# Patient Record
Sex: FEMALE | Race: WHITE | NOT HISPANIC OR LATINO | Employment: OTHER | URBAN - METROPOLITAN AREA
[De-identification: names, ages, dates, MRNs, and addresses within clinical notes are randomized per-mention and may not be internally consistent; named-entity substitution may affect disease eponyms.]

---

## 2017-01-03 ENCOUNTER — TREATMENT (OUTPATIENT)
Dept: PHYSICAL THERAPY | Facility: CLINIC | Age: 62
End: 2017-01-03

## 2017-01-03 DIAGNOSIS — M25.511 ACUTE PAIN OF RIGHT SHOULDER: Primary | ICD-10-CM

## 2017-01-03 DIAGNOSIS — S46.011D ROTATOR CUFF STRAIN, RIGHT, SUBSEQUENT ENCOUNTER: ICD-10-CM

## 2017-01-03 PROCEDURE — 97140 MANUAL THERAPY 1/> REGIONS: CPT | Performed by: PHYSICAL THERAPIST

## 2017-01-03 PROCEDURE — 97110 THERAPEUTIC EXERCISES: CPT | Performed by: PHYSICAL THERAPIST

## 2017-01-03 NOTE — PROGRESS NOTES
Daily Progress Note      Subjective   Pt reports shoulder feels good but sometimes stiff.  Pt reports that shoulder is still making improvements.       Objective   See the Objective Evaluation flowsheet for any objective testing that may have been performed today.    PROCEDURES AND MODALITIES:    Manual PT 46019 10 minutes and Therapy Exercise 59951 20 minutes     Timed Code Treatment: 30 Minutes  Total Treatment Time: 31 Minutes    Assessment/Plan   Pt demonstrated gross shoulder abduction WNL without pain.  Progressed scapular stabilization activities today with mild difficulty and without pain.  Pt is progressing well towards goals and will continue to improve with skilled PT and HEP performance.  Progress strengthening /stabilization /functional activity  Progress treatment to 1x/wk and progress to higher level activity in order to progress to prior level of function including tennis.       Rosa Frey, PT, DPT  Physical Therapist

## 2017-01-05 ENCOUNTER — TREATMENT (OUTPATIENT)
Dept: PHYSICAL THERAPY | Facility: CLINIC | Age: 62
End: 2017-01-05

## 2017-01-05 DIAGNOSIS — M25.511 ACUTE PAIN OF RIGHT SHOULDER: Primary | ICD-10-CM

## 2017-01-05 DIAGNOSIS — S46.011D ROTATOR CUFF STRAIN, RIGHT, SUBSEQUENT ENCOUNTER: ICD-10-CM

## 2017-01-05 PROCEDURE — 97110 THERAPEUTIC EXERCISES: CPT | Performed by: PHYSICAL THERAPIST

## 2017-01-05 PROCEDURE — 97112 NEUROMUSCULAR REEDUCATION: CPT | Performed by: PHYSICAL THERAPIST

## 2017-01-05 NOTE — PROGRESS NOTES
Daily Progress Note      Subjective   Pt reports improvement in shoulder and continued pain only with certain movements.       Objective   See the Objective Evaluation flowsheet for any objective testing that may have been performed today.    PROCEDURES AND MODALITIES:    Manual PT 70077 8 minutes, Therapy Exercise 71157 24 minutes and NMR 47997 10 minutes     Timed Code Treatment: 42 Minutes  Total Treatment Time: 44 Minutes    Assessment/Plan   Progressed shoulder and scapular strengthening today.  Pt reported mild muscle fatigue during treatment session but no increased shoulder pain.  Initiated control activities in 90 degrees of shoulder abduction and ER to improve shoulder and scapular control for tennis activities.   Progress per Plan of Care         Rosa Frey, PT, DPT  Physical Therapist

## 2017-01-12 ENCOUNTER — TREATMENT (OUTPATIENT)
Dept: PHYSICAL THERAPY | Facility: CLINIC | Age: 62
End: 2017-01-12

## 2017-01-12 DIAGNOSIS — M25.511 ACUTE PAIN OF RIGHT SHOULDER: Primary | ICD-10-CM

## 2017-01-12 DIAGNOSIS — S46.011D ROTATOR CUFF STRAIN, RIGHT, SUBSEQUENT ENCOUNTER: ICD-10-CM

## 2017-01-12 PROCEDURE — 97110 THERAPEUTIC EXERCISES: CPT | Performed by: PHYSICAL THERAPIST

## 2017-01-12 NOTE — PATIENT INSTRUCTIONS
Sleep position neutral spine and neutral shoulder position  HEP performance  Please view [facility] Fred Farr for a complete list of HEP instructions.  Appropriate response to treatment and soreness should subside within 24-48 hrs  Gradual return to tennis by assessing movement with racquet without impact  Day of rest in between strengthening activities

## 2017-01-12 NOTE — PROGRESS NOTES
Recertification    Patient: Fred Farr   : 1955  Diagnosis/ICD-10 Code:  Acute pain of right shoulder [M25.511]  Referring practitioner: Viri Caldera MD  Date of Initial Visit: 16  Today's Date: 2017  Patient seen for 8 sessions    Subjective:   Fred Farr reports: no pain at rest but certain positions cause a twinge in shoulder including sleeping with arm elevated above head  Subjective Questionnaire: DASH: 10%  Clinical Progress: improved  Home Program Compliance: Yes  Treatment has included: therapeutic exercise, neuromuscular re-education, manual therapy and therapeutic activity    Objective:   General  Palpation: Right long head biceps tendon and greater tuberosity of humerus (+) TTP   AROM:   RUE AROM (degrees)  R Shoulder ABduction 0-140: 185 Degrees       Strength:  RUE Strength  R Shoulder Flexion: 4+/5 (superior and lateral shoulder pain)  R Shoulder ABduction: 5-/5  R Shoulder External Rotation: 5/5  R Shoulder Horizontal ABduction: 5/5  R Elbow Flexion: 5/5       Special Tests:    Shoulder Special Tests  Drop Arm Test (Full Thickness RC Lesion): Right:, Negative  Empty Can Test (RC Lesion): Right:, Negative  Speed's Test (LH of Biceps Lesion): Right:, Negative  Painful Arc: Right:, Negative  [place]'s Test: Right:, Negative    Right combined shoulder Extension and IR behind back to T10  Assessment:   Functional Limitations: Lifting, Carrying, Pushing, Pulling, Complaints of Pain, Decreased Strength     Pt demonstrates improved symptoms, ROM and strength.  Pt continues to demonstrate weakness and pain with resisted shoulder flexion.  Pt educated on HEP and progression of return to full tennis activity.  Pt is appropriate to decrease frequency of PT visits with continued HEP performance.  Pt will follow up weekly to monitor progress, HEP compliance and gradual return to full function.    Plan:   PT Interventions: Therapeutic exercise - AROM, Therapeutic exercise - stretching,  Therapeutic exercise - strengthening, Manual Therapy, Bracing/Taping, Soft Tissue mobilization, Hot packs/ Moist heat, Electrical stimulation, Posture training,  training, Home Exercise Program  Progress toward previous goals: Partially Met - 3 goals met to date    New Goals  Short-term goals (STG): In 1 week:  Pt will perform all HEP exercises without increased pain.  Pt will sleep without interruptions secondary to shoulder pain.    Long-term goals (LTG): In 3 weeks:  Right shoulder flexion strength improve to 4+/5 without pain to demonstrate improved strength for overhead tasks, lifting and carrying.  Pt will initiate low duration tennis activity without increased pain.  DASH score improve to 0% to demonstrate functional improvement.      Recommendations: Continue with recommendations decrease frequency to 1x/week due to progress and HEP compliance  Timeframe: 3 weeks  Prognosis to achieve goals: good    01/12/2017 Treatments:     Therapy Exercise 38769 38 minutes    Timed Code Treatment: 38   Minutes     Total Treatment Time: 39      Minutes      PT Signature: Rosa Frey, PT, DPT      Based upon review of the patient's progress and continued therapy plan, it is my medical opinion that Fred Farr should continue physical therapy treatment at [address] [facility]  [address] [address] [id number] [phone].    Signature:  Viri Caldera MD

## 2017-01-19 ENCOUNTER — TREATMENT (OUTPATIENT)
Dept: PHYSICAL THERAPY | Facility: CLINIC | Age: 62
End: 2017-01-19

## 2017-01-19 DIAGNOSIS — M25.511 ACUTE PAIN OF RIGHT SHOULDER: Primary | ICD-10-CM

## 2017-01-19 DIAGNOSIS — S46.011D ROTATOR CUFF STRAIN, RIGHT, SUBSEQUENT ENCOUNTER: ICD-10-CM

## 2017-01-19 PROCEDURE — 97110 THERAPEUTIC EXERCISES: CPT | Performed by: PHYSICAL THERAPIST

## 2017-01-19 NOTE — PROGRESS NOTES
Daily Progress Note      Subjective   Pt reports increased soreness earlier in the week due to increased UE carrying and housework activities.  Pt reports minimal compliance with HEP.      Objective   See the Objective Evaluation flowsheet for any objective testing that may have been performed today.    PROCEDURES AND MODALITIES:    Therapy Exercise 13153 34 minutes     Timed Code Treatment: 34 Minutes  Total Treatment Time: 38 Minutes    Assessment/Plan   Pt has maintained full shoulder ROM without pain.  Pt continues to report increased pain and demonstrates weakness with resisted shoulder flexion and abduction.  Pt encouraged on regular HEP performance to strengthen shoulder to carry out functional activities without shoulder pain.  Therapeutic exercises and HEP modified to decrease resistance for shoulder flexion due to pain with resisted testing and higher level of resistance.   Progress per Plan of Care         Rosa Frey, PT, DPT  Physical Therapist

## 2017-01-19 NOTE — PATIENT INSTRUCTIONS
Home ice application  Appropriate response to treatment  HEP performance  Please view [facility] Fred Farr for a complete list of HEP instructions.  Activity modification to decrease stress on right shoulder  Theraband dispensed for HEP

## 2017-01-26 ENCOUNTER — TREATMENT (OUTPATIENT)
Dept: PHYSICAL THERAPY | Facility: CLINIC | Age: 62
End: 2017-01-26

## 2017-01-26 DIAGNOSIS — M25.511 ACUTE PAIN OF RIGHT SHOULDER: Primary | ICD-10-CM

## 2017-01-26 DIAGNOSIS — S46.011D ROTATOR CUFF STRAIN, RIGHT, SUBSEQUENT ENCOUNTER: ICD-10-CM

## 2017-01-26 PROCEDURE — 97110 THERAPEUTIC EXERCISES: CPT | Performed by: PHYSICAL THERAPIST

## 2017-01-26 NOTE — PATIENT INSTRUCTIONS
Sleep position and neutral spine  HEP performance  Purpose of treatment  Please view [facility] Farr for a complete list of HEP instructions.

## 2017-01-26 NOTE — PROGRESS NOTES
Physical Therapy Daily Progress Note    Time In 0907  Time Out 0945    Fred Farr reports: overall still feeling better but shoulder is aggravated with certain movements including reaching and carrying tasks.    Subjective     Objective     Active Range of Motion     Right Shoulder   Flexion: WFL  Abduction: 150 degrees with pain  External rotation 90°: WFL  Internal rotation 90°: 54 degrees with pain    Functional Assessment     Comments  Right shoulder behind back combined extension and internal rotation to T10     See Exercise, Manual, and Modality Logs for complete treatment.       Assessment & Plan     Assessment  Assessment details: Pt demonstrates continued weakness and limited abduction and IR ROM compared to last assessment. Pt required education on importance of regular HEP performance in order to progress and determine if physical therapy is improving status of shoulder.  Pt required increased frequency of PT visits in order to address impairments and ensure compliance with treatment plan.  Pt agrees with plan and demonstrates improved motivation to increase compliance    Plan  Frequency: 2x week  Duration in visits: 2  Treatment plan discussed with: patient  Plan details: Monitor progress and refer to MD if indicated        Progress per Plan of Care           Manual Therapy:    -     mins  02642;  Therapeutic Exercise:    38     mins  70807;     Neuromuscular Jonathon:    -    mins  99664;    Therapeutic Activity:     -     mins  61441;     Gait Training:      -     mins  04141;     Ultrasound:     -     mins  40300;    Electrical Stimulation:    -     mins  00371 ( );  Dry Needling     -     mins self-pay    Timed Treatment:   38   mins   Total Treatment:     38   mins    Rosa Frey, PT, DPT  Physical Therapist

## 2017-02-02 ENCOUNTER — TREATMENT (OUTPATIENT)
Dept: PHYSICAL THERAPY | Facility: CLINIC | Age: 62
End: 2017-02-02

## 2017-02-02 DIAGNOSIS — S46.011D ROTATOR CUFF STRAIN, RIGHT, SUBSEQUENT ENCOUNTER: ICD-10-CM

## 2017-02-02 DIAGNOSIS — M25.511 ACUTE PAIN OF RIGHT SHOULDER: Primary | ICD-10-CM

## 2017-02-02 PROCEDURE — 97110 THERAPEUTIC EXERCISES: CPT | Performed by: PHYSICAL THERAPIST

## 2017-02-02 NOTE — PROGRESS NOTES
Physical Therapy Daily Progress Note    Time In 0800  Time Out 0835    Fred Farr reports: some improvement in shoulder symptoms.    Subjective     Objective     Active Range of Motion     Right Shoulder   Abduction: 183 degrees      See Exercise, Manual, and Modality Logs for complete treatment.       Assessment & Plan     Assessment  Assessment details: Pt demonstrates increased compliance with HEP and improved shoulder AROM.  Pt continues to require skilled physical therapy to improve strength and functional mobility without pain.  Pt tolerated treatment well with mild fatigue and without increased pain.       Progress per Plan of Care           Manual Therapy:    8     mins  14025;  Therapeutic Exercise:    27     mins  39071;     Neuromuscular Jonathon:    -    mins  46164;    Therapeutic Activity:     -     mins  57242;     Gait Training:      -     mins  67360;     Ultrasound:     -     mins  87423;    Electrical Stimulation:    -     mins  91758 ( );  Dry Needling     -     mins self-pay    Timed Treatment:   35   mins   Total Treatment:     35   mins    Rosa Frey, PT, DPT  Physical Therapist

## 2017-02-09 ENCOUNTER — TREATMENT (OUTPATIENT)
Dept: PHYSICAL THERAPY | Facility: CLINIC | Age: 62
End: 2017-02-09

## 2017-02-09 DIAGNOSIS — S46.011D ROTATOR CUFF STRAIN, RIGHT, SUBSEQUENT ENCOUNTER: ICD-10-CM

## 2017-02-09 DIAGNOSIS — M25.511 ACUTE PAIN OF RIGHT SHOULDER: Primary | ICD-10-CM

## 2017-02-09 PROCEDURE — 97535 SELF CARE MNGMENT TRAINING: CPT | Performed by: PHYSICAL THERAPIST

## 2017-02-09 PROCEDURE — 97140 MANUAL THERAPY 1/> REGIONS: CPT | Performed by: PHYSICAL THERAPIST

## 2017-02-09 PROCEDURE — 97110 THERAPEUTIC EXERCISES: CPT | Performed by: PHYSICAL THERAPIST

## 2017-02-09 NOTE — PROGRESS NOTES
"Physical Therapy Daily Progress Note    Time In 0836  Time Out 0915    Fred Farr reports: shoulder is doing ok but some days are \"stiffer\" than others and stretching helps.  Pt reports improved compliance with HEP but \"would not give herself and A+\".    Subjective     Objective     Active Range of Motion     Right Shoulder   Flexion: 182 degrees   Abduction: 161 degrees     Passive Range of Motion     Right Shoulder   Abduction: 183 degrees     Additional Passive Range of Motion Details  IR WFL with pain at end-range       See Exercise, Manual, and Modality Logs for complete treatment.       Assessment & Plan     Assessment  Assessment details: Pt demonstrates decreased shoulder abduction AROM secondary to pain, but continued full PROM.  Pt reports improved compliance with HEP and treatment session focused on improving pain-free ROM and strength.  Pt required review of pathology, involved anatomy, risk of injury, and importance of HEP compliance.  Pt is progressing well with small occasional regressions in ROM likely due to use of right UE and intermittent compliance with HEP.  Pt required frequent verbal cueing to decrease upper trapezius activation and related shoulder elevation during therapeutic exercises.        Progress per Plan of Care           Manual Therapy:    8     mins  33045;  Therapeutic Exercise:    22     mins  37071;     Neuromuscular Jonathon:    -    mins  10585;    Therapeutic Activity:     -     mins  40405;     Gait Training:      -     mins  46741;     Ultrasound:     -     mins  43047;    Electrical Stimulation:    -     mins  31911 ( );  Dry Needling     -     mins self-pay  Self [facility] Home Mgt 8 mins [id number]    Timed Treatment:   38   mins   Total Treatment:     39   mins    Rosa Frey, PT, DPT  Physical Therapist    "

## 2017-02-16 ENCOUNTER — TREATMENT (OUTPATIENT)
Dept: PHYSICAL THERAPY | Facility: CLINIC | Age: 62
End: 2017-02-16

## 2017-02-16 DIAGNOSIS — S46.011D ROTATOR CUFF STRAIN, RIGHT, SUBSEQUENT ENCOUNTER: ICD-10-CM

## 2017-02-16 DIAGNOSIS — M25.511 ACUTE PAIN OF RIGHT SHOULDER: Primary | ICD-10-CM

## 2017-02-16 PROCEDURE — 97110 THERAPEUTIC EXERCISES: CPT | Performed by: PHYSICAL THERAPIST

## 2017-02-16 NOTE — PROGRESS NOTES
Physical Therapy Daily Progress Note    Time In 0803  Time Out 0839    Fred Farr reports: improvement in shoulder symptoms and some improved HEP compliance.  Pt reports having to be cautious not to aggravate shoulder with heavy bag on that side etc.    Subjective     Objective     Active Range of Motion     Right Shoulder   Flexion: WFL  Abduction: 180 degrees     Strength/Myotome Testing     Right Shoulder     Planes of Motion   Flexion: 4+ (increased pain upper lateral and under arm)   Abduction: 4+ (increased pain upper lateral and under arm)      See Exercise, Manual, and Modality Logs for complete treatment.       Assessment & Plan     Assessment  Assessment details: Pt demonstrates improved shoulder abduction ROM and has met associated goal.  Pt demonstrates improved strength but continued pain with resisted testing.  Compliance has improved with HEP, but pt requires continued skilled PT to progress strength for functional activities of lifting and carrying for work bag and groceries and progress to (I) with HEP.       Progress per Plan of Care           Manual Therapy:    -     mins  29602;  Therapeutic Exercise:    34     mins  31218;     Neuromuscular Jonathon:    -    mins  27369;    Therapeutic Activity:     -     mins  47142;     Gait Training:      -     mins  01471;     Ultrasound:     -     mins  39651;    Electrical Stimulation:    -     mins  99147 ( );  Dry Needling     -     mins self-pay    Timed Treatment:   34   mins   Total Treatment:     36   mins    Rosa Frey, PT, DPT  Physical Therapist

## 2017-02-23 ENCOUNTER — TREATMENT (OUTPATIENT)
Dept: PHYSICAL THERAPY | Facility: CLINIC | Age: 62
End: 2017-02-23

## 2017-02-23 DIAGNOSIS — S46.011D ROTATOR CUFF STRAIN, RIGHT, SUBSEQUENT ENCOUNTER: ICD-10-CM

## 2017-02-23 DIAGNOSIS — M25.511 ACUTE PAIN OF RIGHT SHOULDER: Primary | ICD-10-CM

## 2017-02-23 PROCEDURE — 97110 THERAPEUTIC EXERCISES: CPT | Performed by: PHYSICAL THERAPIST

## 2017-02-23 PROCEDURE — A9999 DME SUPPLY OR ACCESSORY, NOS: HCPCS | Performed by: PHYSICAL THERAPIST

## 2017-02-23 NOTE — PROGRESS NOTES
Discharge Report    Patient Name: Fred Farr       Patient MRN: [id number]  : 1955  Physician:Viri Caldera MD  Date: 2017    Encounter Diagnoses   Name Primary?   • Acute pain of right shoulder Yes   • Rotator cuff strain, right, subsequent encounter        Treatment has included therapeutic exercise, neuromuscular re-education, manual therapy and cryotherapy    Physical Therapy Daily Progress Note    Time In 0808  Time Out 0833    Fred Farr reports: doing well and no difficulty with daily activities.  Pt reports feeling ready for graduation.  DASH: 5%    Subjective     Objective     Active Range of Motion     Right Shoulder   Flexion: WFL  Abduction: WFL    Additional Active Range of Motion Details  Functional ROM behind back T9      Strength/Myotome Testing     Right Shoulder     Planes of Motion   Flexion: 4+   Abduction: 4+      See Exercise, Manual, and Modality Logs for complete treatment.       Assessment & Plan     Assessment  Assessment details: Pt demonstrates improved strength and ROM and no pain with functional activities.  Pt did not report pain with resisted testing and performs ADLs without pain.  Pt has met majority of goals and has not attempted tennis activity yet.  Pt educated on re-initiating tennis activity in low duration.  Progressed therapeutic exercises and HEP today with increased resistance and without pain.  Pt demonstrates good understanding and fait compliance with HEP.  Pt is appropriate for d/c to (I) with HEP and instructed to call PT with questions or concerns.       Other - d/c to (I) with HEP      Progress towards goals: Partially Met    Discharge Reason: Anticipate patient will achieve long-term goals independently      Plan of Care: Continue with current home exercise program as instructed    Prognosis: good    Understanding at Discharge: good    Rosa Frey, PT, DPT  Physical Therapist

## 2017-02-23 NOTE — PROGRESS NOTES
Physical Therapy Daily Progress Note    Time In 0808  Time Out 0833    Fred Farr reports: doing well and no difficulty with daily activities.  Pt reports feeling ready for graduation.  DASH: 5%    Subjective     Objective     Active Range of Motion     Right Shoulder   Flexion: WFL  Abduction: WFL    Additional Active Range of Motion Details  Functional ROM behind back T9      Strength/Myotome Testing     Right Shoulder     Planes of Motion   Flexion: 4+   Abduction: 4+      See Exercise, Manual, and Modality Logs for complete treatment.       Assessment & Plan     Assessment  Assessment details: Pt demonstrates improved strength and ROM and no pain with functional activities.  Pt did not report pain with resisted testing and performs ADLs without pain.  Pt has met majority of goals and has not attempted tennis activity yet.  Pt educated on re-initiating tennis activity in low duration.  Progressed therapeutic exercises and HEP today with increased resistance and without pain.  Pt demonstrates good understanding and fait compliance with HEP.  Pt is appropriate for d/c to (I) with HEP and instructed to call PT with questions or concerns.       Other - d/c to (I) with HEP           Manual Therapy:    -     mins  01460;  Therapeutic Exercise:    25     mins  48307;     Neuromuscular Jonathon:    -    mins  55118;    Therapeutic Activity:     -     mins  89553;     Gait Training:      -     mins  81638;     Ultrasound:     -     mins  77521;    Electrical Stimulation:    -     mins  14944 ( );  Dry Needling     -     mins self-pay    Timed Treatment:   25   mins   Total Treatment:     25   mins    Rosa Frey, PT, DPT  Physical Therapist

## 2017-02-23 NOTE — PATIENT INSTRUCTIONS
HEP performance  Please view [facility] Fred Farr for a complete list of HEP instructions.  Dispensed Theraband for HEP performance

## 2017-02-27 RX ORDER — BUPROPION HYDROCHLORIDE 150 MG/1
TABLET ORAL
Qty: 30 TABLET | Refills: 0 | Status: SHIPPED | OUTPATIENT
Start: 2017-02-27 | End: 2017-03-29 | Stop reason: SDUPTHER

## 2017-03-29 RX ORDER — BUPROPION HYDROCHLORIDE 150 MG/1
TABLET ORAL
Qty: 30 TABLET | Refills: 0 | Status: SHIPPED | OUTPATIENT
Start: 2017-03-29 | End: 2017-04-28 | Stop reason: SDUPTHER

## 2017-04-28 RX ORDER — BUPROPION HYDROCHLORIDE 150 MG/1
TABLET ORAL
Qty: 30 TABLET | Refills: 3 | Status: SHIPPED | OUTPATIENT
Start: 2017-04-28 | End: 2017-08-18 | Stop reason: SDUPTHER

## 2017-05-22 ENCOUNTER — TRANSCRIBE ORDERS (OUTPATIENT)
Dept: ADMINISTRATIVE | Facility: HOSPITAL | Age: 62
End: 2017-05-22

## 2017-05-22 DIAGNOSIS — Z12.31 VISIT FOR SCREENING MAMMOGRAM: Primary | ICD-10-CM

## 2017-06-01 ENCOUNTER — HOSPITAL ENCOUNTER (OUTPATIENT)
Dept: MAMMOGRAPHY | Facility: HOSPITAL | Age: 62
Discharge: HOME | End: 2017-06-01
Admitting: INTERNAL MEDICINE

## 2017-06-01 DIAGNOSIS — Z12.31 VISIT FOR SCREENING MAMMOGRAM: ICD-10-CM

## 2017-06-01 PROCEDURE — 77063 BREAST TOMOSYNTHESIS BI: CPT

## 2017-06-01 PROCEDURE — G0202 SCR MAMMO BI INCL CAD: HCPCS

## 2017-08-18 RX ORDER — BUPROPION HYDROCHLORIDE 150 MG/1
TABLET ORAL
Qty: 30 TABLET | Refills: 0 | Status: SHIPPED | OUTPATIENT
Start: 2017-08-18 | End: 2017-09-14 | Stop reason: SDUPTHER

## 2017-09-14 RX ORDER — BUPROPION HYDROCHLORIDE 150 MG/1
TABLET ORAL
Qty: 90 TABLET | Refills: 0 | Status: SHIPPED | OUTPATIENT
Start: 2017-09-14 | End: 2017-10-20 | Stop reason: SDUPTHER

## 2017-09-24 DIAGNOSIS — N28.9 RENAL INSUFFICIENCY: Primary | ICD-10-CM

## 2017-10-04 ENCOUNTER — TELEPHONE (OUTPATIENT)
Dept: INTERNAL MEDICINE | Facility: CLINIC | Age: 62
End: 2017-10-04

## 2017-10-04 NOTE — TELEPHONE ENCOUNTER
Patient had to cancel physical due to work conflict and she needs to reschedule. She doesn't want to wait until April-May for next available. Is there anywhere we can work her in? Mondays and Fridays work best. Please advise.     Spoke to Ting and found a cancellation for patient that works. So disregard note.

## 2017-10-13 DIAGNOSIS — Z00.00 HEALTH CARE MAINTENANCE: Primary | ICD-10-CM

## 2017-10-14 LAB
ALBUMIN SERPL-MCNC: 4.5 G/DL (ref 3.5–5.2)
ALBUMIN/GLOB SERPL: 1.8 G/DL
ALP SERPL-CCNC: 66 U/L (ref 39–117)
ALT SERPL-CCNC: 18 U/L (ref 1–33)
APPEARANCE UR: CLEAR
AST SERPL-CCNC: 24 U/L (ref 1–32)
BACTERIA #/AREA URNS HPF: NORMAL /HPF
BASOPHILS # BLD AUTO: 0.04 10*3/MM3 (ref 0–0.2)
BASOPHILS NFR BLD AUTO: 0.6 % (ref 0–1.5)
BILIRUB SERPL-MCNC: 0.4 MG/DL (ref 0.1–1.2)
BILIRUB UR QL STRIP: NEGATIVE
BUN SERPL-MCNC: 22 MG/DL (ref 8–23)
BUN/CREAT SERPL: 22.9 (ref 7–25)
CALCIUM SERPL-MCNC: 9.8 MG/DL (ref 8.6–10.5)
CHLORIDE SERPL-SCNC: 103 MMOL/L (ref 98–107)
CHOLEST SERPL-MCNC: 186 MG/DL (ref 0–200)
CO2 SERPL-SCNC: 27.1 MMOL/L (ref 22–29)
COLOR UR: YELLOW
CREAT SERPL-MCNC: 0.96 MG/DL (ref 0.57–1)
EOSINOPHIL # BLD AUTO: 0.1 10*3/MM3 (ref 0–0.7)
EOSINOPHIL NFR BLD AUTO: 1.6 % (ref 0.3–6.2)
EPI CELLS #/AREA URNS HPF: NORMAL /HPF
ERYTHROCYTE [DISTWIDTH] IN BLOOD BY AUTOMATED COUNT: 15.5 % (ref 11.7–13)
GLOBULIN SER CALC-MCNC: 2.5 GM/DL
GLUCOSE SERPL-MCNC: 87 MG/DL (ref 65–99)
GLUCOSE UR QL: NEGATIVE
HCT VFR BLD AUTO: 47.5 % (ref 35.6–45.5)
HDLC SERPL-MCNC: 68 MG/DL (ref 40–60)
HGB BLD-MCNC: 15.2 G/DL (ref 11.9–15.5)
HGB UR QL STRIP: NEGATIVE
IMM GRANULOCYTES # BLD: 0.02 10*3/MM3 (ref 0–0.03)
IMM GRANULOCYTES NFR BLD: 0.3 % (ref 0–0.5)
KETONES UR QL STRIP: NEGATIVE
LDLC SERPL CALC-MCNC: 104 MG/DL (ref 0–100)
LEUKOCYTE ESTERASE UR QL STRIP: NEGATIVE
LYMPHOCYTES # BLD AUTO: 2.08 10*3/MM3 (ref 0.9–4.8)
LYMPHOCYTES NFR BLD AUTO: 33.5 % (ref 19.6–45.3)
MCH RBC QN AUTO: 29.6 PG (ref 26.9–32)
MCHC RBC AUTO-ENTMCNC: 32 G/DL (ref 32.4–36.3)
MCV RBC AUTO: 92.4 FL (ref 80.5–98.2)
MICRO URNS: NORMAL
MICRO URNS: NORMAL
MONOCYTES # BLD AUTO: 0.42 10*3/MM3 (ref 0.2–1.2)
MONOCYTES NFR BLD AUTO: 6.8 % (ref 5–12)
MUCOUS THREADS URNS QL MICRO: PRESENT /HPF
NEUTROPHILS # BLD AUTO: 3.54 10*3/MM3 (ref 1.9–8.1)
NEUTROPHILS NFR BLD AUTO: 57.2 % (ref 42.7–76)
NITRITE UR QL STRIP: NEGATIVE
PH UR STRIP: 7.5 [PH] (ref 5–7.5)
PLATELET # BLD AUTO: 282 10*3/MM3 (ref 140–500)
POTASSIUM SERPL-SCNC: 5.2 MMOL/L (ref 3.5–5.2)
PROT SERPL-MCNC: 7 G/DL (ref 6–8.5)
PROT UR QL STRIP: NEGATIVE
RBC # BLD AUTO: 5.14 10*6/MM3 (ref 3.9–5.2)
RBC #/AREA URNS HPF: NORMAL /HPF
SODIUM SERPL-SCNC: 143 MMOL/L (ref 136–145)
SP GR UR: 1.02 (ref 1–1.03)
TRIGL SERPL-MCNC: 70 MG/DL (ref 0–150)
TSH SERPL DL<=0.005 MIU/L-ACNC: 1.55 MIU/ML (ref 0.27–4.2)
URINALYSIS REFLEX: NORMAL
UROBILINOGEN UR STRIP-MCNC: 0.2 MG/DL (ref 0.2–1)
VLDLC SERPL CALC-MCNC: 14 MG/DL (ref 5–40)
WBC # BLD AUTO: 6.2 10*3/MM3 (ref 4.5–10.7)
WBC #/AREA URNS HPF: NORMAL /HPF

## 2017-10-15 NOTE — PROGRESS NOTES
Your laboratory results are NORMAL. We will discuss these results in detail at your next office visit. Please call with any questions or concerns.

## 2017-10-20 ENCOUNTER — OFFICE VISIT (OUTPATIENT)
Dept: INTERNAL MEDICINE | Facility: CLINIC | Age: 62
End: 2017-10-20

## 2017-10-20 VITALS — WEIGHT: 148 LBS | BODY MASS INDEX: 23.18 KG/M2 | OXYGEN SATURATION: 97 % | HEART RATE: 74 BPM

## 2017-10-20 DIAGNOSIS — Z12.11 COLON CANCER SCREENING: ICD-10-CM

## 2017-10-20 DIAGNOSIS — Z00.00 HEALTH CARE MAINTENANCE: Primary | ICD-10-CM

## 2017-10-20 PROCEDURE — 90656 IIV3 VACC NO PRSV 0.5 ML IM: CPT | Performed by: INTERNAL MEDICINE

## 2017-10-20 PROCEDURE — 90472 IMMUNIZATION ADMIN EACH ADD: CPT | Performed by: INTERNAL MEDICINE

## 2017-10-20 PROCEDURE — 90715 TDAP VACCINE 7 YRS/> IM: CPT | Performed by: INTERNAL MEDICINE

## 2017-10-20 PROCEDURE — 90471 IMMUNIZATION ADMIN: CPT | Performed by: INTERNAL MEDICINE

## 2017-10-20 PROCEDURE — 99396 PREV VISIT EST AGE 40-64: CPT | Performed by: INTERNAL MEDICINE

## 2017-10-20 RX ORDER — BUPROPION HYDROCHLORIDE 150 MG/1
150 TABLET ORAL EVERY MORNING
Qty: 90 TABLET | Refills: 3 | Status: SHIPPED | OUTPATIENT
Start: 2017-10-20 | End: 2018-11-02 | Stop reason: SDUPTHER

## 2017-12-13 RX ORDER — BUPROPION HYDROCHLORIDE 150 MG/1
TABLET ORAL
Qty: 90 TABLET | Refills: 0 | Status: SHIPPED | OUTPATIENT
Start: 2017-12-13 | End: 2018-03-02 | Stop reason: SDUPTHER

## 2018-01-12 DIAGNOSIS — Z12.11 ENCOUNTER FOR SCREENING FOR MALIGNANT NEOPLASM OF COLON: Primary | ICD-10-CM

## 2018-01-12 RX ORDER — SODIUM CHLORIDE, SODIUM LACTATE, POTASSIUM CHLORIDE, CALCIUM CHLORIDE 600; 310; 30; 20 MG/100ML; MG/100ML; MG/100ML; MG/100ML
30 INJECTION, SOLUTION INTRAVENOUS CONTINUOUS
Status: CANCELLED | OUTPATIENT
Start: 2018-03-05

## 2018-01-18 PROBLEM — Z12.11 ENCOUNTER FOR SCREENING FOR MALIGNANT NEOPLASM OF COLON: Status: ACTIVE | Noted: 2018-01-18

## 2018-03-05 ENCOUNTER — ANESTHESIA (OUTPATIENT)
Dept: GASTROENTEROLOGY | Facility: HOSPITAL | Age: 63
End: 2018-03-05

## 2018-03-05 ENCOUNTER — TELEPHONE (OUTPATIENT)
Dept: GASTROENTEROLOGY | Facility: CLINIC | Age: 63
End: 2018-03-05

## 2018-03-05 ENCOUNTER — HOSPITAL ENCOUNTER (OUTPATIENT)
Facility: HOSPITAL | Age: 63
Setting detail: HOSPITAL OUTPATIENT SURGERY
Discharge: HOME | End: 2018-03-05
Attending: INTERNAL MEDICINE | Admitting: INTERNAL MEDICINE

## 2018-03-05 ENCOUNTER — ANESTHESIA EVENT (OUTPATIENT)
Dept: GASTROENTEROLOGY | Facility: HOSPITAL | Age: 63
End: 2018-03-05

## 2018-03-05 VITALS
RESPIRATION RATE: 14 BRPM | WEIGHT: 148.8 LBS | HEIGHT: 67 IN | HEART RATE: 62 BPM | TEMPERATURE: 97.6 F | DIASTOLIC BLOOD PRESSURE: 74 MMHG | OXYGEN SATURATION: 100 % | SYSTOLIC BLOOD PRESSURE: 105 MMHG | BODY MASS INDEX: 23.35 KG/M2

## 2018-03-05 DIAGNOSIS — Z12.11 ENCOUNTER FOR SCREENING FOR MALIGNANT NEOPLASM OF COLON: ICD-10-CM

## 2018-03-05 PROCEDURE — S0260 H&P FOR SURGERY: HCPCS | Performed by: INTERNAL MEDICINE

## 2018-03-05 PROCEDURE — 25010000002 PROPOFOL 10 MG/ML EMULSION: Performed by: ANESTHESIOLOGY

## 2018-03-05 PROCEDURE — 45378 DIAGNOSTIC COLONOSCOPY: CPT | Performed by: INTERNAL MEDICINE

## 2018-03-05 RX ORDER — LIDOCAINE HYDROCHLORIDE 20 MG/ML
INJECTION, SOLUTION INFILTRATION; PERINEURAL AS NEEDED
Status: DISCONTINUED | OUTPATIENT
Start: 2018-03-05 | End: 2018-03-05 | Stop reason: SURG

## 2018-03-05 RX ORDER — PROPOFOL 10 MG/ML
VIAL (ML) INTRAVENOUS AS NEEDED
Status: DISCONTINUED | OUTPATIENT
Start: 2018-03-05 | End: 2018-03-05 | Stop reason: SURG

## 2018-03-05 RX ORDER — PROPOFOL 10 MG/ML
VIAL (ML) INTRAVENOUS CONTINUOUS PRN
Status: DISCONTINUED | OUTPATIENT
Start: 2018-03-05 | End: 2018-03-05 | Stop reason: SURG

## 2018-03-05 RX ORDER — SODIUM CHLORIDE, SODIUM LACTATE, POTASSIUM CHLORIDE, CALCIUM CHLORIDE 600; 310; 30; 20 MG/100ML; MG/100ML; MG/100ML; MG/100ML
30 INJECTION, SOLUTION INTRAVENOUS CONTINUOUS
Status: DISCONTINUED | OUTPATIENT
Start: 2018-03-05 | End: 2018-03-05 | Stop reason: HOSPADM

## 2018-03-05 RX ADMIN — SODIUM CHLORIDE, POTASSIUM CHLORIDE, SODIUM LACTATE AND CALCIUM CHLORIDE 30 ML/HR: 600; 310; 30; 20 INJECTION, SOLUTION INTRAVENOUS at 12:52

## 2018-03-05 RX ADMIN — PROPOFOL 50 MG: 10 INJECTION, EMULSION INTRAVENOUS at 13:04

## 2018-03-05 RX ADMIN — PROPOFOL 120 MCG/KG/MIN: 10 INJECTION, EMULSION INTRAVENOUS at 13:04

## 2018-03-05 RX ADMIN — LIDOCAINE HYDROCHLORIDE 60 MG: 20 INJECTION, SOLUTION INFILTRATION; PERINEURAL at 13:04

## 2018-03-05 NOTE — H&P
[facility]  Pre Procedure History & Physical    Chief Complaint: colon cancer screening      HPI: 63-year-old woman with a past medical history as below here for her second screening colonoscopy.  Her first colonoscopy was in 2005 and reportedly normal.  She has a family history of colon cancer in her father; he was older than 7 years of age.  She denies any change in bowel habits or blood in her stool.  She otherwise feels well today.    Past Medical History:   Past Medical History:   Diagnosis Date   • Anxiety    • Depression    • Low blood pressure    • Skin cancer        Family History:  Family History   Problem Relation Age of Onset   • Heart disease Mother    • Arthritis Mother    • Cancer Father      Colon cancer   • Macular degeneration Father    • Asthma Sister    • Hypothyroidism Brother    • Macular degeneration Brother    • Thyroid disease Brother    • Stroke Paternal Grandfather    • Malig Hyperthermia Neg Hx        Social History:   reports that she has never smoked. She has never used smokeless tobacco. She reports that she drinks alcohol. She reports that she does not use illicit drugs.    Medications:   Prescriptions Prior to Admission   Medication Sig Dispense Refill Last Dose   • acyclovir (ZOVIRAX) 5 % ointment Apply  topically Every 8 (Eight) Hours. 5 g 2 Taking   • aspirin 81 MG EC tablet Take 81 mg by mouth Daily.      • buPROPion XL (WELLBUTRIN XL) 150 MG 24 hr tablet Take 1 tablet by mouth Every Morning. 90 tablet 3    • Clocortolone Pivalate 0.1 % cream Apply  topically 3 (three) times a day.   Not Taking   • Eflornithine HCl 13.9 % cream Apply  topically. Apply and gently massage into affected area(s) twice daily at least 8 hours apart.   Not Taking   • ofloxacin (OCUFLOX) 0.3 % ophthalmic solution Administer 1 drop to both eyes 4 (Four) Times a Day. 5 mL 0 Not Taking       Allergies:  Review of patient's allergies indicates no known allergies.    ROS:    Pertinent  "items are noted in HPI     Objective     Height 167.6 cm (66\").    Physical Exam   Constitutional: Pt is oriented to person, place, and time and well-developed, well-nourished, and in no distress.   HENT:   Mouth/Throat: Oropharynx is clear and moist.   Neck: Normal range of motion. Neck supple.   Cardiovascular: Normal rate, regular rhythm and normal heart sounds.    Pulmonary/Chest: Effort normal and breath sounds normal. No respiratory distress. No  wheezes.   Abdominal: Soft. Bowel sounds are normal.   Skin: Skin is warm and dry.   Psychiatric: Mood, memory, affect and judgment normal.     Assessment/Plan     Diagnosis:  colon cancer screening      Anticipated Surgical Procedure:    Colonoscopy    The risks, benefits, and alternatives of this procedure have been discussed with the patient or the responsible party- the patient understands and agrees to proceed.  "

## 2018-03-05 NOTE — ANESTHESIA POSTPROCEDURE EVALUATION
Patient: Fred Farr    Procedure Summary     Date Anesthesia Start Anesthesia Stop Room / Location    03/05/18 [id number]  [facility]       Procedure Diagnosis Surgeon Provider    COLONOSCOPY TO CECUM AND TI (N/A ) Encounter for screening for malignant neoplasm of colon  (Encounter for screening for malignant neoplasm of colon [Z12.11]) MD Tiny Reyes MD          Anesthesia Type: MAC  Last vitals  BP   106/67 (03/05/18 1349)   Temp   36.4 °C (97.6 °F) (03/05/18 1349)   Pulse   62 (03/05/18 1349)   Resp   14 (03/05/18 1243)     SpO2   100 % (03/05/18 1349)     Post Anesthesia Care and Evaluation    Patient location during evaluation: bedside  Patient participation: complete - patient participated  Level of consciousness: awake and alert  Pain management: adequate  Airway patency: patent  Anesthetic complications: No anesthetic complications  PONV Status: controlled  Cardiovascular status: acceptable  Respiratory status: acceptable  Hydration status: acceptable

## 2018-03-05 NOTE — PLAN OF CARE
Problem: Patient Care Overview (Adult)  Goal: Adult Individualization and Mutuality  Outcome: Ongoing (interventions implemented as appropriate)   03/05/18 1228   Individualization   Patient Specific Preferences goes by Uan

## 2018-03-05 NOTE — PLAN OF CARE
Problem: Patient Care Overview (Adult)  Goal: Plan of Care Review  Outcome: Ongoing (interventions implemented as appropriate)   03/05/18 1224   Coping/Psychosocial Response Interventions   Plan Of Care Reviewed With patient   Patient Care Overview   Progress no change     Goal: Adult Individualization and Mutuality  Outcome: Ongoing (interventions implemented as appropriate)    Goal: Discharge Needs Assessment  Outcome: Ongoing (interventions implemented as appropriate)   03/05/18 1224   Discharge Needs Assessment   Concerns To Be Addressed no discharge needs identified   Discharge Disposition home or self-care   Living Environment   Transportation Available car       Problem: GI Endoscopy (Adult)  Goal: Signs and Symptoms of Listed Potential Problems Will be Absent or Manageable (GI Endoscopy)  Outcome: Ongoing (interventions implemented as appropriate)   03/05/18 1224   GI Endoscopy   Problems Present (GI Endoscopy) none

## 2018-03-05 NOTE — TELEPHONE ENCOUNTER
----- Message from Danette Shabazz MD sent at 3/5/2018  1:36 PM EST -----  pls place in 10 year colonoscopy recall, thx

## 2018-03-05 NOTE — ANESTHESIA PREPROCEDURE EVALUATION
Anesthesia Evaluation     Patient summary reviewed and Nursing notes reviewed                Airway   Mallampati: II  TM distance: >3 FB  Neck ROM: full  No difficulty expected  Dental      Pulmonary    Cardiovascular     (+) dysrhythmias (RBBB),       Neuro/Psych  (+) psychiatric history Anxiety and Depression,     GI/Hepatic/Renal/Endo      Musculoskeletal     Abdominal    Substance History      OB/GYN          Other   (+) arthritis (right shoulder pain)   history of cancer (skin)                  Anesthesia Plan    ASA 2     MAC   total IV anesthesia  intravenous induction   Anesthetic plan and risks discussed with patient.

## 2018-03-21 RX ORDER — ACYCLOVIR 50 MG/G
OINTMENT TOPICAL
Qty: 15 G | Refills: 0 | Status: SHIPPED | OUTPATIENT
Start: 2018-03-21 | End: 2019-03-24 | Stop reason: SDUPTHER

## 2018-07-14 ENCOUNTER — OFFICE VISIT (OUTPATIENT)
Dept: RETAIL CLINIC | Facility: CLINIC | Age: 63
End: 2018-07-14

## 2018-07-14 VITALS
TEMPERATURE: 98.6 F | SYSTOLIC BLOOD PRESSURE: 94 MMHG | OXYGEN SATURATION: 99 % | RESPIRATION RATE: 16 BRPM | HEART RATE: 72 BPM | DIASTOLIC BLOOD PRESSURE: 68 MMHG

## 2018-07-14 DIAGNOSIS — J02.9 SORE THROAT: Primary | ICD-10-CM

## 2018-07-14 LAB
EXPIRATION DATE: NORMAL
INTERNAL CONTROL: NORMAL
Lab: NORMAL
S PYO AG THROAT QL: NEGATIVE

## 2018-07-14 PROCEDURE — 87880 STREP A ASSAY W/OPTIC: CPT | Performed by: NURSE PRACTITIONER

## 2018-07-14 PROCEDURE — 99213 OFFICE O/P EST LOW 20 MIN: CPT | Performed by: NURSE PRACTITIONER

## 2018-07-14 RX ORDER — PREDNISONE 20 MG/1
20 TABLET ORAL 2 TIMES DAILY
Qty: 10 TABLET | Refills: 0 | Status: SHIPPED | OUTPATIENT
Start: 2018-07-14 | End: 2018-07-19

## 2018-07-14 NOTE — PROGRESS NOTES
Subjective:     Fred Farr is a 63 y.o.     URI    This is a new problem. There has been no fever. Associated symptoms include congestion (minimal) and a sore throat. Pertinent negatives include no ear pain, headaches or rhinorrhea. Treatments tried: aleve. The treatment provided mild relief.         The following portions of the patient's history were reviewed and updated as appropriate: allergies, current medications, past family history, past medical history, past social history, past surgical history and problem list.      Review of Systems   HENT: Positive for congestion (minimal) and sore throat. Negative for ear pain and rhinorrhea.    Respiratory: Negative.    Cardiovascular: Negative.    Neurological: Negative for dizziness, light-headedness and headaches.         Objective:      Physical Exam   Constitutional: She appears well-developed and well-nourished.   HENT:   Head: Normocephalic and atraumatic.   Right Ear: Ear canal normal. Right ear middle ear effusion: mild serous.   Left Ear: Tympanic membrane and ear canal normal.   Nose: Nose normal.   Mouth/Throat: Posterior oropharyngeal erythema present. No oropharyngeal exudate.   Cardiovascular: Normal rate, regular rhythm and normal heart sounds.    Pulmonary/Chest: Effort normal and breath sounds normal.   Lymphadenopathy:     She has cervical adenopathy (mild left sided).   Vitals reviewed.          Diagnoses and all orders for this visit:    Sore throat  -     POC Rapid Strep A  -     Beta Strep Culture, Throat - Swab, Throat    Other orders  -     predniSONE (DELTASONE) 20 MG tablet; Take 1 tablet by mouth 2 (Two) Times a Day for 5 days.

## 2018-07-14 NOTE — PATIENT INSTRUCTIONS
Pharyngitis  Pharyngitis is redness, pain, and swelling (inflammation) of the throat (pharynx). It is a very common cause of sore throat. Pharyngitis can be caused by a bacteria, but it is usually caused by a virus. Most cases of pharyngitis get better on their own without treatment.  What are the causes?  This condition may be caused by:  · Infection by viruses (viral). Viral pharyngitis spreads from person to person (is contagious) through coughing, sneezing, and sharing of personal items or utensils such as cups, forks, spoons, and toothbrushes.  · Infection by bacteria (bacterial). Bacterial pharyngitis may be spread by touching the nose or face after coming in contact with the bacteria, or through more intimate contact, such as kissing.  · Allergies. Allergies can cause buildup of mucus in the throat (post-nasal drip), leading to inflammation and irritation. Allergies can also cause blocked nasal passages, forcing breathing through the mouth, which dries and irritates the throat.    What increases the risk?  You are more likely to develop this condition if:  · You are 5-24 years old.  · You are exposed to crowded environments such as , school, or dormitory living.  · You live in a cold climate.  · You have a weakened disease-fighting (immune) system.    What are the signs or symptoms?  Symptoms of this condition vary by the cause (viral, bacterial, or allergies) and can include:  · Sore throat.  · Fatigue.  · Low-grade fever.  · Headache.  · Joint pain and muscle aches.  · Skin rashes.  · Swollen glands in the throat (lymph nodes).  · Plaque-like film on the throat or tonsils. This is often a symptom of bacterial pharyngitis.  · Vomiting.  · Stuffy nose (nasal congestion).  · Cough.  · Red, itchy eyes (conjunctivitis).  · Loss of appetite.    How is this diagnosed?  This condition is often diagnosed based on your medical history and a physical exam. Your health care provider will ask you questions about  your illness and your symptoms. A swab of your throat may be done to check for bacteria (rapid strep test). Other lab tests may also be done, depending on the suspected cause, but these are rare.  How is this treated?  This condition usually gets better in 3-4 days without medicine. Bacterial pharyngitis may be treated with antibiotic medicines.  Follow these instructions at home:  · Take over-the-counter and prescription medicines only as told by your health care provider.  ? If you were prescribed an antibiotic medicine, take it as told by your health care provider. Do not stop taking the antibiotic even if you start to feel better.  ? Do not give children aspirin because of the association with Reye syndrome.  · Drink enough water and fluids to keep your urine clear or pale yellow.  · Get a lot of rest.  · Gargle with a salt-water mixture 3-4 times a day or as needed. To make a salt-water mixture, completely dissolve ½-1 tsp of salt in 1 cup of warm water.  · If your health care provider approves, you may use throat lozenges or sprays to soothe your throat.  Contact a health care provider if:  · You have large, tender lumps in your neck.  · You have a rash.  · You cough up green, yellow-brown, or bloody spit.  Get help right away if:  · Your neck becomes stiff.  · You drool or are unable to swallow liquids.  · You cannot drink or take medicines without vomiting.  · You have severe pain that does not go away, even after you take medicine.  · You have trouble breathing, and it is not caused by a stuffy nose.  · You have new pain and swelling in your joints such as the knees, ankles, wrists, or elbows.  Summary  · Pharyngitis is redness, pain, and swelling (inflammation) of the throat (pharynx).  · While pharyngitis can be caused by a bacteria, the most common causes are viral.  · Most cases of pharyngitis get better on their own without treatment.  · Bacterial pharyngitis is treated with antibiotic medicines.  This  information is not intended to replace advice given to you by your health care provider. Make sure you discuss any questions you have with your health care provider.  Document Released: 12/18/2006 Document Revised: 01/23/2018 Document Reviewed: 01/23/2018  Elsevier Interactive Patient Education © 2018 [employer]

## 2018-07-17 LAB — S PYO THROAT QL CULT: NEGATIVE

## 2018-07-19 ENCOUNTER — TELEPHONE (OUTPATIENT)
Dept: RETAIL CLINIC | Facility: CLINIC | Age: 63
End: 2018-07-19

## 2018-07-19 NOTE — TELEPHONE ENCOUNTER
Strep culture back and negative. Patient reached by phone. Results conveyed. Patient verbalizes understanding and reports feeling much better.

## 2018-07-19 NOTE — TELEPHONE ENCOUNTER
Late entry from 07/18/2018: Strep culture back and negative. Attempt to reach patient by phone. No answer. LMOR to return call. Results to be conveyed when patient is reached.

## 2018-10-28 DIAGNOSIS — Z00.00 HEALTHCARE MAINTENANCE: Primary | ICD-10-CM

## 2018-10-29 LAB
ALBUMIN SERPL-MCNC: 4.5 G/DL (ref 3.5–5.2)
ALBUMIN/GLOB SERPL: 1.6 G/DL
ALP SERPL-CCNC: 80 U/L (ref 39–117)
ALT SERPL-CCNC: 18 U/L (ref 1–33)
AST SERPL-CCNC: 27 U/L (ref 1–32)
BASOPHILS # BLD AUTO: 0.07 10*3/MM3 (ref 0–0.2)
BASOPHILS NFR BLD AUTO: 1.2 % (ref 0–1.5)
BILIRUB SERPL-MCNC: 0.3 MG/DL (ref 0.1–1.2)
BUN SERPL-MCNC: 19 MG/DL (ref 8–23)
BUN/CREAT SERPL: 15.8 (ref 7–25)
CALCIUM SERPL-MCNC: 9.8 MG/DL (ref 8.6–10.5)
CHLORIDE SERPL-SCNC: 103 MMOL/L (ref 98–107)
CHOLEST SERPL-MCNC: 195 MG/DL (ref 0–200)
CO2 SERPL-SCNC: 27.3 MMOL/L (ref 22–29)
CREAT SERPL-MCNC: 1.2 MG/DL (ref 0.57–1)
EOSINOPHIL # BLD AUTO: 0.12 10*3/MM3 (ref 0–0.7)
EOSINOPHIL NFR BLD AUTO: 2.1 % (ref 0.3–6.2)
ERYTHROCYTE [DISTWIDTH] IN BLOOD BY AUTOMATED COUNT: 15.1 % (ref 11.7–13)
GLOBULIN SER CALC-MCNC: 2.8 GM/DL
GLUCOSE SERPL-MCNC: 81 MG/DL (ref 65–99)
HCT VFR BLD AUTO: 48.6 % (ref 35.6–45.5)
HDLC SERPL-MCNC: 70 MG/DL (ref 40–60)
HGB BLD-MCNC: 15.6 G/DL (ref 11.9–15.5)
IMM GRANULOCYTES # BLD: 0.01 10*3/MM3 (ref 0–0.03)
IMM GRANULOCYTES NFR BLD: 0.2 % (ref 0–0.5)
LDLC SERPL CALC-MCNC: 109 MG/DL (ref 0–100)
LDLC/HDLC SERPL: 1.56 {RATIO}
LYMPHOCYTES # BLD AUTO: 2.17 10*3/MM3 (ref 0.9–4.8)
LYMPHOCYTES NFR BLD AUTO: 37.5 % (ref 19.6–45.3)
MCH RBC QN AUTO: 29.3 PG (ref 26.9–32)
MCHC RBC AUTO-ENTMCNC: 32.1 G/DL (ref 32.4–36.3)
MCV RBC AUTO: 91.4 FL (ref 80.5–98.2)
MONOCYTES # BLD AUTO: 0.53 10*3/MM3 (ref 0.2–1.2)
MONOCYTES NFR BLD AUTO: 9.2 % (ref 5–12)
NEUTROPHILS # BLD AUTO: 2.89 10*3/MM3 (ref 1.9–8.1)
NEUTROPHILS NFR BLD AUTO: 50 % (ref 42.7–76)
PLATELET # BLD AUTO: 307 10*3/MM3 (ref 140–500)
POTASSIUM SERPL-SCNC: 5.8 MMOL/L (ref 3.5–5.2)
PROT SERPL-MCNC: 7.3 G/DL (ref 6–8.5)
RBC # BLD AUTO: 5.32 10*6/MM3 (ref 3.9–5.2)
SODIUM SERPL-SCNC: 142 MMOL/L (ref 136–145)
TRIGL SERPL-MCNC: 78 MG/DL (ref 0–150)
TSH SERPL DL<=0.005 MIU/L-ACNC: 2.85 MIU/ML (ref 0.27–4.2)
VLDLC SERPL CALC-MCNC: 15.6 MG/DL (ref 5–40)
WBC # BLD AUTO: 5.78 10*3/MM3 (ref 4.5–10.7)

## 2018-11-02 ENCOUNTER — OFFICE VISIT (OUTPATIENT)
Dept: INTERNAL MEDICINE | Facility: CLINIC | Age: 63
End: 2018-11-02

## 2018-11-02 VITALS
DIASTOLIC BLOOD PRESSURE: 60 MMHG | HEART RATE: 70 BPM | OXYGEN SATURATION: 98 % | BODY MASS INDEX: 24.01 KG/M2 | SYSTOLIC BLOOD PRESSURE: 100 MMHG | WEIGHT: 151 LBS

## 2018-11-02 DIAGNOSIS — Z12.4 CERVICAL CANCER SCREENING: ICD-10-CM

## 2018-11-02 DIAGNOSIS — Z11.59 ENCOUNTER FOR HEPATITIS C SCREENING TEST FOR LOW RISK PATIENT: ICD-10-CM

## 2018-11-02 DIAGNOSIS — N28.9 RENAL INSUFFICIENCY: ICD-10-CM

## 2018-11-02 DIAGNOSIS — I45.10 RIGHT BUNDLE BRANCH BLOCK: ICD-10-CM

## 2018-11-02 DIAGNOSIS — E87.5 HYPERKALEMIA: ICD-10-CM

## 2018-11-02 DIAGNOSIS — Z12.39 BREAST CANCER SCREENING: ICD-10-CM

## 2018-11-02 DIAGNOSIS — L30.9 DERMATITIS: ICD-10-CM

## 2018-11-02 DIAGNOSIS — R94.31 ABNORMAL EKG: ICD-10-CM

## 2018-11-02 DIAGNOSIS — Z00.00 HEALTHCARE MAINTENANCE: Primary | ICD-10-CM

## 2018-11-02 LAB
BUN SERPL-MCNC: 20 MG/DL (ref 8–23)
BUN/CREAT SERPL: 18.7 (ref 7–25)
CALCIUM SERPL-MCNC: 9.5 MG/DL (ref 8.6–10.5)
CHLORIDE SERPL-SCNC: 104 MMOL/L (ref 98–107)
CO2 SERPL-SCNC: 22.4 MMOL/L (ref 22–29)
CREAT SERPL-MCNC: 1.07 MG/DL (ref 0.57–1)
GLUCOSE SERPL-MCNC: 86 MG/DL (ref 65–99)
POTASSIUM SERPL-SCNC: 4.8 MMOL/L (ref 3.5–5.2)
SODIUM SERPL-SCNC: 141 MMOL/L (ref 136–145)

## 2018-11-02 PROCEDURE — G0438 PPPS, INITIAL VISIT: HCPCS | Performed by: INTERNAL MEDICINE

## 2018-11-02 PROCEDURE — 93000 ELECTROCARDIOGRAM COMPLETE: CPT | Performed by: INTERNAL MEDICINE

## 2018-11-02 PROCEDURE — 99213 OFFICE O/P EST LOW 20 MIN: CPT | Performed by: INTERNAL MEDICINE

## 2018-11-02 PROCEDURE — G0101 CA SCREEN;PELVIC/BREAST EXAM: HCPCS | Performed by: INTERNAL MEDICINE

## 2018-11-02 RX ORDER — ACYCLOVIR 400 MG/1
400 TABLET ORAL 3 TIMES DAILY
Qty: 15 TABLET | Refills: 0 | Status: SHIPPED | OUTPATIENT
Start: 2018-11-02 | End: 2019-03-24 | Stop reason: SDUPTHER

## 2018-11-02 RX ORDER — BUPROPION HYDROCHLORIDE 150 MG/1
150 TABLET ORAL EVERY MORNING
Qty: 90 TABLET | Refills: 3 | Status: SHIPPED | OUTPATIENT
Start: 2018-11-02 | End: 2019-11-11 | Stop reason: SDUPTHER

## 2018-11-02 NOTE — PROGRESS NOTES
Subjective   AWV  Renal insufficiency   Hyperkalemia  Anxious/ depressed symptoms    Fred Farr is a 63 y.o. female who presents for an annual wellness visit, to review chronic issues, and to address acute needs. She has a history of anxious/ depresssed symptoms. This is very well managed with welbutrin. She is feeling well. Labs reveal elevated cr and potassium. Patient denies recent NSAID and has normal urination. Her hydration is not consistent.   Reports that fitness is about 5000 steps on average. She is not routine in her fitness.         Review of Systems   Constitutional: Negative.    HENT: Negative.    Eyes: Negative.    Respiratory: Negative.    Cardiovascular: Negative.    Gastrointestinal: Positive for constipation.   Endocrine: Negative.    Genitourinary: Negative.    Musculoskeletal: Negative.    Skin: Positive for rash.   Allergic/Immunologic: Negative.    Neurological: Negative.    Hematological: Negative.    Psychiatric/Behavioral: Negative.         Mood stable       The following portions of the patient's history were reviewed and updated as appropriate: allergies, current medications, past family history, past medical history, past social history, past surgical history and problem list.     Patient Active Problem List   Diagnosis   • Allergic rhinitis   • Acute pain of right shoulder   • Healthcare maintenance   • Hearing impairment   • Recurrent cold sores   • Acute conjunctivitis of both eyes   • Encounter for screening for malignant neoplasm of colon       Past Medical History:   Diagnosis Date   • Anxiety    • Depression    • Low blood pressure    • Right bundle branch block    • Skin cancer        Past Surgical History:   Procedure Laterality Date   • MOHS SURGERY     • OOPHORECTOMY      12 years ago   • RHINOPLASTY      X2       Family History   Problem Relation Age of Onset   • Heart disease Mother    • Arthritis Mother    • Cancer Father         Colon cancer   • Macular degeneration  Father    • Asthma Sister    • Hypothyroidism Brother    • Macular degeneration Brother    • Thyroid disease Brother    • Stroke Paternal Grandfather    • Malig Hyperthermia Neg Hx        Social History     Socioeconomic History   • Marital status:      Spouse name: Not on file   • Number of children: Not on file   • Years of education: Not on file   • Highest education level: Not on file   Social Needs   • Financial resource strain: Not on file   • Food insecurity - worry: Not on file   • Food insecurity - inability: Not on file   • Transportation needs - medical: Not on file   • Transportation needs - non-medical: Not on file   Occupational History   • Not on file   Tobacco Use   • Smoking status: Never Smoker   • Smokeless tobacco: Never Used   Substance and Sexual Activity   • Alcohol use: Yes     Comment: SOCIAL   • Drug use: No   • Sexual activity: Defer   Other Topics Concern   • Not on file   Social History Narrative   • Not on file       Current Outpatient Medications on File Prior to Visit   Medication Sig Dispense Refill   • acyclovir (ZOVIRAX) 5 % ointment APPLY TO THE AFFECTED AREA EVERY 8 HOURS AS DIRECTED 15 g 0   • aspirin 81 MG EC tablet Take 81 mg by mouth Daily.       No current facility-administered medications on file prior to visit.        No Known Allergies    Immunization History   Administered Date(s) Administered   • Flu Mist 11/24/2014   • Flu Vaccine Quad PF >18YRS 11/22/2016, 10/20/2017, 10/01/2018   • Pneumococcal Conjugate 13-Valent (PCV13) 11/16/2018   • Pneumococcal Polysaccharide (PPSV23) 08/03/2013   • Tdap 10/20/2017   • Zostavax 03/01/2016       Objective     /60   Pulse 70   Wt 68.5 kg (151 lb)   SpO2 98%   BMI 24.01 kg/m²     Physical Exam   Constitutional: She is oriented to person, place, and time. She appears well-developed and well-nourished.   HENT:   Head: Normocephalic and atraumatic.   Right Ear: Hearing, tympanic membrane and external ear normal.    Left Ear: Hearing, tympanic membrane and external ear normal.   Nose: Nose normal.   Mouth/Throat: Oropharynx is clear and moist.   Eyes: Pupils are equal, round, and reactive to light. Conjunctivae and EOM are normal.   Neck: Normal range of motion. Neck supple. No thyromegaly present.   Cardiovascular: Normal rate, regular rhythm, normal heart sounds and intact distal pulses.    No murmur heard.  Pulmonary/Chest: Effort normal and breath sounds normal. Right breast exhibits no mass. Left breast exhibits no mass.   Abdominal: Soft. Bowel sounds are normal. She exhibits no distension. There is no hepatosplenomegaly. There is no tenderness.   Genitourinary: Vagina normal and uterus normal. No breast tenderness.   Genitourinary Comments: Mild atrophic changes. Normal cervix. Slightly retroverted. Pap obtained.    Musculoskeletal: Normal range of motion.   Lymphadenopathy:     She has no cervical adenopathy.   Neurological: She is alert and oriented to person, place, and time.   Skin: Skin is warm and dry.   Psychiatric: She has a normal mood and affect. Her speech is normal and behavior is normal. Judgment and thought content normal. Cognition and memory are normal.   Nursing note and vitals reviewed.    EKG for renal insufficiency and hyperkalemia. RBBB. Unchanged from prior.     Assessment/Plan   Fred was seen today for annual exam.    Diagnoses and all orders for this visit:    Healthcare maintenance    Right bundle branch block  -     ECG 12 Lead  -     Adult Stress Echo W/ Cont or Stress Agent if Necessary Per Protocol; Future    Renal insufficiency  -     Basic Metabolic Panel    Hyperkalemia  -     Basic Metabolic Panel    Encounter for hepatitis C screening test for low risk patient  -     Hepatitis C Antibody    Abnormal EKG  -     Adult Stress Echo W/ Cont or Stress Agent if Necessary Per Protocol; Future    Dermatitis    Cervical cancer screening  -     Pap IG, HPV-hr    Breast cancer screening  -      Mammo screening digital tomosynthesis bilateral w CAD; Future    Other orders  -     triamcinolone (KENALOG) 0.1 % ointment; Apply  topically to the appropriate area as directed 2 (Two) Times a Day.  -     buPROPion XL (WELLBUTRIN XL) 150 MG 24 hr tablet; Take 1 tablet by mouth Every Morning.  -     acyclovir (ZOVIRAX) 400 MG tablet; Take 1 tablet by mouth 3 (Three) Times a Day. Take no more than 5 doses a day.  -     HPV, Low Volume Reflex        Discussion    AWV.     Direct observation of cognitive ability was evaluated and is normal. Patient was given health advice or handouts on the following:  nutrition, fall prevention, exercise, weight management  Patient will receive a Hep A vac today and prevnar in the future. She will get annual mammography. She has a rbbb on ekg and was advised a stress echo to further eval 2016. As this has not been obtained, will reorder this test today. She will continue wellbutrin for mood. She will try acyclovir oral prn herpes labialis. She has an annular dermatitis on her left thigh eczema v fungal. Will try triamcinolone but if area does not appear or worsens will treat with antifungal. She will have hep C screening today. Pap obtained today w hpv testing. She had renal insuff and hyperkalemia on screening labs and a repeat bmp was obtained today. If all wnl she will f/u in 1 year or prn.              Future Appointments   Date Time Provider Department Center   11/1/2019  8:40 AM [facility] None   11/8/2019  3:30 PM Viri Caldera MD [facility] None

## 2018-11-07 LAB
CYTOLOGIST CVX/VAG CYTO: NORMAL
CYTOLOGY CVX/VAG DOC THIN PREP: NORMAL
DX ICD CODE: NORMAL
HIV 1 & 2 AB SER-IMP: NORMAL
HPV I/H RISK 1 DNA CVX QL PROBE+SIG AMP: NORMAL
HPV I/H RISK 4 DNA CVX QL PROBE+SIG AMP: NEGATIVE
OTHER STN SPEC: NORMAL
PATH REPORT.FINAL DX SPEC: NORMAL
STAT OF ADQ CVX/VAG CYTO-IMP: NORMAL

## 2018-11-09 ENCOUNTER — HOSPITAL ENCOUNTER (OUTPATIENT)
Dept: MAMMOGRAPHY | Facility: HOSPITAL | Age: 63
Discharge: HOME | End: 2018-11-09
Admitting: INTERNAL MEDICINE

## 2018-11-09 DIAGNOSIS — Z12.39 BREAST CANCER SCREENING: ICD-10-CM

## 2018-11-09 PROCEDURE — 77063 BREAST TOMOSYNTHESIS BI: CPT

## 2018-11-09 PROCEDURE — 77067 SCR MAMMO BI INCL CAD: CPT

## 2018-11-16 ENCOUNTER — HOSPITAL ENCOUNTER (OUTPATIENT)
Dept: CARDIOLOGY | Facility: HOSPITAL | Age: 63
Discharge: HOME | End: 2018-11-16
Admitting: INTERNAL MEDICINE

## 2018-11-16 ENCOUNTER — LAB (OUTPATIENT)
Dept: INTERNAL MEDICINE | Facility: CLINIC | Age: 63
End: 2018-11-16

## 2018-11-16 VITALS
HEIGHT: 66 IN | WEIGHT: 151 LBS | HEART RATE: 66 BPM | OXYGEN SATURATION: 97 % | SYSTOLIC BLOOD PRESSURE: 102 MMHG | DIASTOLIC BLOOD PRESSURE: 80 MMHG | BODY MASS INDEX: 24.27 KG/M2

## 2018-11-16 DIAGNOSIS — R94.31 ABNORMAL EKG: ICD-10-CM

## 2018-11-16 DIAGNOSIS — I45.10 RIGHT BUNDLE BRANCH BLOCK: ICD-10-CM

## 2018-11-16 LAB
ASCENDING AORTA: 2.5 CM
BH CV ECHO MEAS - ACS: 1.7 CM
BH CV ECHO MEAS - AO MAX PG: 5.7 MMHG
BH CV ECHO MEAS - AO ROOT AREA (BSA CORRECTED): 1.6
BH CV ECHO MEAS - AO ROOT AREA: 6.7 CM^2
BH CV ECHO MEAS - AO ROOT DIAM: 2.9 CM
BH CV ECHO MEAS - AO V2 MAX: 119.4 CM/SEC
BH CV ECHO MEAS - ASC AORTA: 2.5 CM
BH CV ECHO MEAS - BSA(HAYCOCK): 1.8 M^2
BH CV ECHO MEAS - BSA: 1.8 M^2
BH CV ECHO MEAS - BZI_BMI: 24.4 KILOGRAMS/M^2
BH CV ECHO MEAS - BZI_METRIC_HEIGHT: 167.6 CM
BH CV ECHO MEAS - BZI_METRIC_WEIGHT: 68.5 KG
BH CV ECHO MEAS - EDV(CUBED): 103.3 ML
BH CV ECHO MEAS - EDV(MOD-SP2): 76 ML
BH CV ECHO MEAS - EDV(MOD-SP4): 64 ML
BH CV ECHO MEAS - EDV(TEICH): 101.9 ML
BH CV ECHO MEAS - EF(CUBED): 77.3 %
BH CV ECHO MEAS - EF(MOD-BP): 63 %
BH CV ECHO MEAS - EF(MOD-SP2): 78.9 %
BH CV ECHO MEAS - EF(MOD-SP4): 82.8 %
BH CV ECHO MEAS - EF(TEICH): 69.4 %
BH CV ECHO MEAS - ESV(CUBED): 23.4 ML
BH CV ECHO MEAS - ESV(MOD-SP2): 16 ML
BH CV ECHO MEAS - ESV(MOD-SP4): 11 ML
BH CV ECHO MEAS - ESV(TEICH): 31.2 ML
BH CV ECHO MEAS - FS: 39 %
BH CV ECHO MEAS - IVS/LVPW: 0.91
BH CV ECHO MEAS - IVSD: 0.67 CM
BH CV ECHO MEAS - LAT PEAK E' VEL: 8 CM/SEC
BH CV ECHO MEAS - LV DIASTOLIC VOL/BSA (35-75): 36.1 ML/M^2
BH CV ECHO MEAS - LV MASS(C)D: 102.5 GRAMS
BH CV ECHO MEAS - LV MASS(C)DI: 57.8 GRAMS/M^2
BH CV ECHO MEAS - LV SYSTOLIC VOL/BSA (12-30): 6.2 ML/M^2
BH CV ECHO MEAS - LVIDD: 4.7 CM
BH CV ECHO MEAS - LVIDS: 2.9 CM
BH CV ECHO MEAS - LVLD AP2: 6.5 CM
BH CV ECHO MEAS - LVLD AP4: 6.5 CM
BH CV ECHO MEAS - LVLS AP2: 4.9 CM
BH CV ECHO MEAS - LVLS AP4: 4.4 CM
BH CV ECHO MEAS - LVOT AREA (M): 2.5 CM^2
BH CV ECHO MEAS - LVOT AREA: 2.5 CM^2
BH CV ECHO MEAS - LVOT DIAM: 1.8 CM
BH CV ECHO MEAS - LVPWD: 0.73 CM
BH CV ECHO MEAS - MED PEAK E' VEL: 7 CM/SEC
BH CV ECHO MEAS - MV A DUR: 0.11 SEC
BH CV ECHO MEAS - MV A MAX VEL: 51.9 CM/SEC
BH CV ECHO MEAS - MV DEC SLOPE: 213.6 CM/SEC^2
BH CV ECHO MEAS - MV DEC TIME: 0.22 SEC
BH CV ECHO MEAS - MV E MAX VEL: 52.9 CM/SEC
BH CV ECHO MEAS - MV E/A: 1
BH CV ECHO MEAS - MV MAX PG: 1.2 MMHG
BH CV ECHO MEAS - MV MEAN PG: 0.62 MMHG
BH CV ECHO MEAS - MV P1/2T MAX VEL: 56.3 CM/SEC
BH CV ECHO MEAS - MV P1/2T: 77.2 MSEC
BH CV ECHO MEAS - MV V2 MAX: 55.7 CM/SEC
BH CV ECHO MEAS - MV V2 MEAN: 37 CM/SEC
BH CV ECHO MEAS - MV V2 VTI: 19.7 CM
BH CV ECHO MEAS - MVA P1/2T LCG: 3.9 CM^2
BH CV ECHO MEAS - MVA(P1/2T): 2.8 CM^2
BH CV ECHO MEAS - PULM A REVS DUR: 0.11 SEC
BH CV ECHO MEAS - PULM A REVS VEL: 27.6 CM/SEC
BH CV ECHO MEAS - PULM DIAS VEL: 57.7 CM/SEC
BH CV ECHO MEAS - PULM S/D: 0.89
BH CV ECHO MEAS - PULM SYS VEL: 51.4 CM/SEC
BH CV ECHO MEAS - SI(CUBED): 45 ML/M^2
BH CV ECHO MEAS - SI(MOD-SP2): 33.8 ML/M^2
BH CV ECHO MEAS - SI(MOD-SP4): 29.9 ML/M^2
BH CV ECHO MEAS - SI(TEICH): 39.9 ML/M^2
BH CV ECHO MEAS - SV(CUBED): 79.8 ML
BH CV ECHO MEAS - SV(MOD-SP2): 60 ML
BH CV ECHO MEAS - SV(MOD-SP4): 53 ML
BH CV ECHO MEAS - SV(TEICH): 70.8 ML
BH CV ECHO MEAS - TAPSE (>1.6): 2.4 CM2
BH CV ECHO MEAS - TR MAX VEL: 165.6 CM/SEC
BH CV ECHO MEASUREMENTS AVERAGE E/E' RATIO: 7.05
BH CV STRESS BP STAGE 1: NORMAL
BH CV STRESS BP STAGE 2: NORMAL
BH CV STRESS DURATION MIN STAGE 1: 3
BH CV STRESS DURATION MIN STAGE 2: 3
BH CV STRESS DURATION MIN STAGE 3: 1
BH CV STRESS DURATION SEC STAGE 1: 0
BH CV STRESS DURATION SEC STAGE 2: 0
BH CV STRESS DURATION SEC STAGE 3: 0
BH CV STRESS ECHO POST STRESS EJECTION FRACTION EF: 80 %
BH CV STRESS GRADE STAGE 1: 10
BH CV STRESS GRADE STAGE 2: 12
BH CV STRESS GRADE STAGE 3: 14
BH CV STRESS HR STAGE 1: 104
BH CV STRESS HR STAGE 2: 125
BH CV STRESS HR STAGE 3: 135
BH CV STRESS METS STAGE 1: 5
BH CV STRESS METS STAGE 2: 7.5
BH CV STRESS METS STAGE 3: 10
BH CV STRESS PROTOCOL 1: NORMAL
BH CV STRESS SPEED STAGE 1: 1.7
BH CV STRESS SPEED STAGE 2: 2.5
BH CV STRESS SPEED STAGE 3: 3.4
BH CV STRESS STAGE 1: 1
BH CV STRESS STAGE 2: 2
BH CV STRESS STAGE 3: 3
BH CV VAS BP RIGHT ARM: NORMAL MMHG
BH CV XLRA - RV BASE: 2.9 CM
BH CV XLRA - TDI S': 12 CM/SEC
LEFT ATRIUM VOLUME INDEX: 19 ML/M2
LV EF 2D ECHO EST: 63 %
MAXIMAL PREDICTED HEART RATE: 155 BPM
PERCENT MAX PREDICTED HR: 87.1 %
SINUS: 2.5 CM
STJ: 2.5 CM
STRESS BASELINE BP: NORMAL MMHG
STRESS BASELINE HR: 66 BPM
STRESS PERCENT HR: 102 %
STRESS POST ESTIMATED WORKLOAD: 8 METS
STRESS POST EXERCISE DUR MIN: 7 MIN
STRESS POST EXERCISE DUR SEC: 0 SEC
STRESS POST PEAK BP: NORMAL MMHG
STRESS POST PEAK HR: 135 BPM
STRESS TARGET HR: 132 BPM

## 2018-11-16 PROCEDURE — 93350 STRESS TTE ONLY: CPT | Performed by: INTERNAL MEDICINE

## 2018-11-16 PROCEDURE — 93350 STRESS TTE ONLY: CPT

## 2018-11-16 PROCEDURE — G0009 ADMIN PNEUMOCOCCAL VACCINE: HCPCS | Performed by: INTERNAL MEDICINE

## 2018-11-16 PROCEDURE — 25010000002 PERFLUTREN (DEFINITY) 8.476 MG IN SODIUM CHLORIDE 0.9 % 10 ML INJECTION: Performed by: INTERNAL MEDICINE

## 2018-11-16 PROCEDURE — 90670 PCV13 VACCINE IM: CPT | Performed by: INTERNAL MEDICINE

## 2018-11-16 PROCEDURE — 93016 CV STRESS TEST SUPVJ ONLY: CPT | Performed by: INTERNAL MEDICINE

## 2018-11-16 PROCEDURE — 93320 DOPPLER ECHO COMPLETE: CPT | Performed by: INTERNAL MEDICINE

## 2018-11-16 PROCEDURE — 93325 DOPPLER ECHO COLOR FLOW MAPG: CPT | Performed by: INTERNAL MEDICINE

## 2018-11-16 PROCEDURE — 93320 DOPPLER ECHO COMPLETE: CPT

## 2018-11-16 PROCEDURE — 93017 CV STRESS TEST TRACING ONLY: CPT

## 2018-11-16 PROCEDURE — 93325 DOPPLER ECHO COLOR FLOW MAPG: CPT

## 2018-11-16 PROCEDURE — 93352 ADMIN ECG CONTRAST AGENT: CPT | Performed by: INTERNAL MEDICINE

## 2018-11-16 PROCEDURE — 93018 CV STRESS TEST I&R ONLY: CPT | Performed by: INTERNAL MEDICINE

## 2018-11-16 RX ADMIN — PERFLUTREN 3 ML: 6.52 INJECTION, SUSPENSION INTRAVENOUS at 09:50

## 2018-12-03 RX ORDER — BUPROPION HYDROCHLORIDE 150 MG/1
150 TABLET ORAL EVERY MORNING
Qty: 90 TABLET | Refills: 0 | Status: SHIPPED | OUTPATIENT
Start: 2018-12-03 | End: 2019-04-12 | Stop reason: SDUPTHER

## 2019-03-24 ENCOUNTER — OFFICE VISIT (OUTPATIENT)
Dept: RETAIL CLINIC | Facility: CLINIC | Age: 64
End: 2019-03-24

## 2019-03-24 VITALS
SYSTOLIC BLOOD PRESSURE: 108 MMHG | OXYGEN SATURATION: 97 % | RESPIRATION RATE: 18 BRPM | HEART RATE: 76 BPM | BODY MASS INDEX: 24.37 KG/M2 | TEMPERATURE: 98.2 F | DIASTOLIC BLOOD PRESSURE: 68 MMHG | HEIGHT: 66 IN

## 2019-03-24 DIAGNOSIS — B35.3 TINEA PEDIS OF LEFT FOOT: ICD-10-CM

## 2019-03-24 DIAGNOSIS — R05.9 COUGHING: ICD-10-CM

## 2019-03-24 DIAGNOSIS — J06.9 ACUTE URI: Primary | ICD-10-CM

## 2019-03-24 PROCEDURE — 99213 OFFICE O/P EST LOW 20 MIN: CPT | Performed by: NURSE PRACTITIONER

## 2019-03-24 RX ORDER — AMOXICILLIN AND CLAVULANATE POTASSIUM 875; 125 MG/1; MG/1
1 TABLET, FILM COATED ORAL 2 TIMES DAILY
Qty: 20 TABLET | Refills: 0 | Status: SHIPPED | OUTPATIENT
Start: 2019-03-24 | End: 2019-04-03

## 2019-03-24 RX ORDER — ACYCLOVIR 400 MG/1
400 TABLET ORAL 3 TIMES DAILY
Qty: 15 TABLET | Refills: 0 | Status: SHIPPED | OUTPATIENT
Start: 2019-03-24 | End: 2019-11-08

## 2019-03-24 RX ORDER — NYSTATIN 100000 [USP'U]/G
POWDER TOPICAL 3 TIMES DAILY
Qty: 15 G | Refills: 0 | Status: SHIPPED | OUTPATIENT
Start: 2019-03-24 | End: 2019-04-12

## 2019-03-24 RX ORDER — ACYCLOVIR 50 MG/G
OINTMENT TOPICAL
Qty: 15 G | Refills: 0 | Status: SHIPPED | OUTPATIENT
Start: 2019-03-24

## 2019-03-24 RX ORDER — BENZONATATE 100 MG/1
100 CAPSULE ORAL 3 TIMES DAILY PRN
Qty: 30 CAPSULE | Refills: 0 | Status: SHIPPED | OUTPATIENT
Start: 2019-03-24 | End: 2019-04-03

## 2019-03-24 NOTE — PROGRESS NOTES
"Subjective   Fred Farr is a 64 y.o. female.       /68 (BP Location: Left arm, Patient Position: Sitting, Cuff Size: Adult)   Pulse 76   Temp 98.2 °F (36.8 °C) (Oral)   Resp 18   Ht 167.6 cm (66\")   SpO2 97%   BMI 24.37 kg/m²     Pt with c/o tinea in between toes on left foot, not getting better. Pt also would like to have refill on her acyclovir.        URI    This is a new problem. The current episode started in the past 7 days. The problem has been gradually worsening. There has been no fever. Associated symptoms include congestion, coughing, headaches and sinus pain. She has tried antihistamine, sleep and increased fluids for the symptoms. The treatment provided no relief.        The following portions of the patient's history were reviewed and updated as appropriate: current medications, past family history, past medical history, past social history, past surgical history and problem list.    Review of Systems   Constitutional: Positive for chills and fatigue.   HENT: Positive for congestion, postnasal drip and sinus pressure.    Respiratory: Positive for cough.    Cardiovascular: Negative.    Gastrointestinal: Negative.    Genitourinary: Negative.    Musculoskeletal: Negative.    Skin: Positive for dry skin.   Allergic/Immunologic: Positive for environmental allergies.   Neurological: Positive for headache.   Psychiatric/Behavioral: Negative.        Objective   Physical Exam   Constitutional: She is oriented to person, place, and time. She appears well-developed and well-nourished.   HENT:   Head: Normocephalic and atraumatic.   Right Ear: Hearing, tympanic membrane, external ear and ear canal normal.   Left Ear: Hearing, tympanic membrane, external ear and ear canal normal.   Nose: Sinus tenderness and congestion present.   Mouth/Throat: Uvula is midline, oropharynx is clear and moist and mucous membranes are normal.       Eyes: Conjunctivae and EOM are normal. Pupils are equal, round, and " reactive to light.   Neck: Normal range of motion.   Cardiovascular: Normal rate, regular rhythm and normal heart sounds.   Pulmonary/Chest: Effort normal and breath sounds normal.   Abdominal: Soft. Bowel sounds are normal.   Musculoskeletal: Normal range of motion.   Neurological: She is alert and oriented to person, place, and time.   Skin: Skin is warm. Capillary refill takes less than 2 seconds. Turgor is normal. Rash (tinea between toes) noted.   Psychiatric: She has a normal mood and affect.   Vitals reviewed.        Assessment/Plan   Fred was seen today for uri.    Diagnoses and all orders for this visit:    Acute URI  -     amoxicillin-clavulanate (AUGMENTIN) 875-125 MG per tablet; Take 1 tablet by mouth 2 (Two) Times a Day for 10 days.    Coughing  -     benzonatate (TESSALON PERLES) 100 MG capsule; Take 1 capsule by mouth 3 (Three) Times a Day As Needed for Cough for up to 10 days.    Tinea pedis of left foot  -     nystatin (MYCOSTATIN) 320147 UNIT/GM powder; Apply  topically to the appropriate area as directed 3 (Three) Times a Day.    Other orders  -     acyclovir (ZOVIRAX) 5 % ointment; Apply to affected area tid prn  -     acyclovir (ZOVIRAX) 400 MG tablet; Take 1 tablet by mouth 3 (Three) Times a Day. Take no more than 5 doses a day.          Upper Respiratory Infection, Adult  An upper respiratory infection (URI) is a common viral infection of the nose, throat, and upper air passages that lead to the lungs. The most common type of URI is the common cold. URIs usually get better on their own, without medical treatment.  What are the causes?  A URI is caused by a virus. You may catch a virus by:  · Breathing in droplets from an infected person's cough or sneeze.  · Touching something that has been exposed to the virus (contaminated) and then touching your mouth, nose, or eyes.    What increases the risk?  You are more likely to get a URI if:  · You are very young or very old.  · It is veronique or  winter.  · You have close contact with others, such as at a , school, or health care facility.  · You smoke.  · You have long-term (chronic) heart or lung disease.  · You have a weakened disease-fighting (immune) system.  · You have nasal allergies or asthma.  · You are experiencing a lot of stress.  · You work in an area that has poor air circulation.  · You have poor nutrition.    What are the signs or symptoms?  A URI usually involves some of the following symptoms:  · Runny or stuffy (congested) nose.  · Sneezing.  · Cough.  · Sore throat.  · Headache.  · Fatigue.  · Fever.  · Loss of appetite.  · Pain in your forehead, behind your eyes, and over your cheekbones (sinus pain).  · Muscle aches.  · Redness or irritation of the eyes.  · Pressure in the ears or face.    How is this diagnosed?  This condition may be diagnosed based on your medical history and symptoms, and a physical exam. Your health care provider may use a cotton swab to take a mucus sample from your nose (nasal swab). This sample can be tested to determine what virus is causing the illness.  How is this treated?  URIs usually get better on their own within 7-10 days. You can take steps at home to relieve your symptoms. Medicines cannot cure URIs, but your health care provider may recommend certain medicines to help relieve symptoms, such as:  · Over-the-counter cold medicines.  · Cough suppressants. Coughing is a type of defense against infection that helps to clear the respiratory system, so take these medicines only as recommended by your health care provider.  · Fever-reducing medicines.    Follow these instructions at home:  Activity  · Rest as needed.  · If you have a fever, stay home from work or school until your fever is gone or until your health care provider says you are no longer contagious. Your health care provider may have you wear a face mask to prevent your infection from spreading.  Relieving symptoms  · Gargle with a  salt-water mixture 3-4 times a day or as needed. To make a salt-water mixture, completely dissolve ½-1 tsp of salt in 1 cup of warm water.  · Use a cool-mist humidifier to add moisture to the air. This can help you breathe more easily.  Eating and drinking  · Drink enough fluid to keep your urine pale yellow.  · Eat soups and other clear broths.  General instructions  · Take over-the-counter and prescription medicines only as told by your health care provider. These include cold medicines, fever reducers, and cough suppressants.  · Do not use any products that contain nicotine or tobacco, such as cigarettes and e-cigarettes. If you need help quitting, ask your health care provider.  · Stay away from secondhand smoke.  · Stay up to date on all immunizations, including the yearly (annual) flu vaccine.  · Keep all follow-up visits as told by your health care provider. This is important.  How to prevent the spread of infection to others  · URIs can be passed from person to person (are contagious). To prevent the infection from spreading:  ? Wash your hands often with soap and water. If soap and water are not available, use hand .  ? Avoid touching your mouth, face, eyes, or nose.  ? Cough or sneeze into a tissue or your sleeve or elbow instead of into your hand or into the air.  Contact a health care provider if:  · You are getting worse instead of better.  · You have a fever or chills.  · Your mucus is brown or red.  · You have yellow or brown discharge coming from your nose.  · You have pain in your face, especially when you bend forward.  · You have swollen neck glands.  · You have pain while swallowing.  · You have white areas in the back of your throat.  Get help right away if:  · You have shortness of breath that gets worse.  · You have severe or persistent:  ? Headache.  ? Ear pain.  ? Sinus pain.  ? Chest pain.  · You have chronic lung disease along with any of the following:  ? Wheezing.  ? Prolonged  cough.  ? Coughing up blood.  ? A change in your usual mucus.  · You have a stiff neck.  · You have changes in your:  ? Vision.  ? Hearing.  ? Thinking.  ? Mood.  Summary  · An upper respiratory infection (URI) is a common infection of the nose, throat, and upper air passages that lead to the lungs.  · A URI is caused by a virus.  · URIs usually get better on their own within 7-10 days.  · Medicines cannot cure URIs, but your health care provider may recommend certain medicines to help relieve symptoms.  This information is not intended to replace advice given to you by your health care provider. Make sure you discuss any questions you have with your health care provider.  Document Released: 06/13/2002 Document Revised: 08/03/2018 Document Reviewed: 08/03/2018  [employer] Interactive Patient Education © 2019 [employer]

## 2019-03-24 NOTE — PATIENT INSTRUCTIONS

## 2019-04-12 ENCOUNTER — OFFICE VISIT (OUTPATIENT)
Dept: INTERNAL MEDICINE | Facility: CLINIC | Age: 64
End: 2019-04-12

## 2019-04-12 VITALS
SYSTOLIC BLOOD PRESSURE: 100 MMHG | DIASTOLIC BLOOD PRESSURE: 66 MMHG | WEIGHT: 149 LBS | HEART RATE: 78 BPM | BODY MASS INDEX: 24.05 KG/M2 | OXYGEN SATURATION: 98 %

## 2019-04-12 DIAGNOSIS — B35.1 ONYCHOMYCOSIS: ICD-10-CM

## 2019-04-12 DIAGNOSIS — R05.9 COUGH: Primary | ICD-10-CM

## 2019-04-12 DIAGNOSIS — Z12.83 SKIN CANCER SCREENING: ICD-10-CM

## 2019-04-12 DIAGNOSIS — F41.9 ANXIOUS MOOD: ICD-10-CM

## 2019-04-12 PROCEDURE — 99214 OFFICE O/P EST MOD 30 MIN: CPT | Performed by: INTERNAL MEDICINE

## 2019-04-12 RX ORDER — ALBUTEROL SULFATE 90 UG/1
2 AEROSOL, METERED RESPIRATORY (INHALATION) EVERY 4 HOURS PRN
Qty: 1 INHALER | Refills: 5 | Status: SHIPPED | OUTPATIENT
Start: 2019-04-12 | End: 2019-12-19 | Stop reason: SDUPTHER

## 2019-04-12 RX ORDER — MONTELUKAST SODIUM 10 MG/1
10 TABLET ORAL NIGHTLY
Qty: 30 TABLET | Refills: 5 | Status: SHIPPED | OUTPATIENT
Start: 2019-04-12 | End: 2019-12-19 | Stop reason: SDUPTHER

## 2019-04-12 NOTE — PROGRESS NOTES
Chief Complaint   Patient presents with   • Cough   cough, dyspnea, onychomycosis      History of Present Illness   Fred Farr is a 64 y.o. female presents for acute needs. Reports 3 weeks ago she went to urgent treatment for cough and feeling unwell. She was givena course of augmentin and finished this. She was feeling well for several days and then a couple of days ago she started a dry cough with chest tightness. She does not have an inhaler but would have used it if she had one. Today she does feel somewhat better than the last several days.   She has onychomycosis. She is interested in taking meds for this.   She also would like a referral for psychotherapy. She is having increased stress and feeling anxious. Family members ill and mother in law passed away. She does not feel she needs to change wellbutrin but would benefit from counseling.       The following portions of the patient's history were reviewed and updated as appropriate: allergies, current medications, past family history, past medical history, past social history, past surgical history and problem list.  Current Outpatient Medications on File Prior to Visit   Medication Sig Dispense Refill   • acyclovir (ZOVIRAX) 400 MG tablet Take 1 tablet by mouth 3 (Three) Times a Day. Take no more than 5 doses a day. 15 tablet 0   • acyclovir (ZOVIRAX) 5 % ointment Apply to affected area tid prn 15 g 0   • aspirin 81 MG EC tablet Take 81 mg by mouth Daily.     • buPROPion XL (WELLBUTRIN XL) 150 MG 24 hr tablet Take 1 tablet by mouth Every Morning. 90 tablet 3   • [DISCONTINUED] buPROPion XL (WELLBUTRIN XL) 150 MG 24 hr tablet TAKE 1 TABLET BY MOUTH EVERY MORNING 90 tablet 0   • [DISCONTINUED] nystatin (MYCOSTATIN) 806880 UNIT/GM powder Apply  topically to the appropriate area as directed 3 (Three) Times a Day. 15 g 0   • [DISCONTINUED] triamcinolone (KENALOG) 0.1 % ointment Apply  topically to the appropriate area as directed 2 (Two) Times a Day. 15 g 1      No current facility-administered medications on file prior to visit.      Review of Systems   Constitutional: Negative.    HENT: Negative.    Eyes: Negative.    Respiratory: Positive for cough, shortness of breath and wheezing.    Cardiovascular: Negative.    Gastrointestinal: Negative.    Endocrine: Negative.    Genitourinary: Negative.    Musculoskeletal: Negative.    Skin: Negative.        Objective   Physical Exam   Constitutional: She is oriented to person, place, and time. She appears well-developed and well-nourished.   HENT:   Head: Normocephalic and atraumatic.   Right Ear: External ear normal.   Left Ear: External ear normal.   Mouth/Throat: Oropharynx is clear and moist.   Eyes: EOM are normal. Pupils are equal, round, and reactive to light.   Neck: Normal range of motion. Neck supple.   Cardiovascular: Normal rate, regular rhythm, normal heart sounds and intact distal pulses.   Pulmonary/Chest:   Mild prolonged expiration   Abdominal: Soft. Bowel sounds are normal.   Musculoskeletal: Normal range of motion.   Neurological: She is alert and oriented to person, place, and time.   Skin: Skin is warm and dry.   Psychiatric: She has a normal mood and affect. Her behavior is normal. Judgment and thought content normal.   Nursing note and vitals reviewed.       /66   Pulse 78   Wt 67.6 kg (149 lb)   SpO2 98%   BMI 24.05 kg/m²     Assessment/Plan   Diagnoses and all orders for this visit:    Cough    Skin cancer screening  -     Ambulatory Referral to Dermatology    Onychomycosis    Anxious mood    Other orders  -     montelukast (SINGULAIR) 10 MG tablet; Take 1 tablet by mouth Every Night.  -     albuterol sulfate  (90 Base) MCG/ACT inhaler; Inhale 2 puffs Every 4 (Four) Hours As Needed for Wheezing.      Patient w/ a persistent cough. She will start singulair and may use albuterol as needed for cough or wheeze. She may use mucinex or tessalon as needed. She is advised that toe nails do not  have to be treated at this time. She will treat symptomatically. She has an anxious mood and will go to psychology for this. She will continue skin screening through dermatology. F/u routinely.

## 2019-10-17 ENCOUNTER — TRANSCRIBE ORDERS (OUTPATIENT)
Dept: ADMINISTRATIVE | Facility: HOSPITAL | Age: 64
End: 2019-10-17

## 2019-10-17 DIAGNOSIS — Z12.31 VISIT FOR SCREENING MAMMOGRAM: Primary | ICD-10-CM

## 2019-10-24 DIAGNOSIS — Z00.00 HEALTHCARE MAINTENANCE: Primary | ICD-10-CM

## 2019-11-03 LAB
ALBUMIN SERPL-MCNC: 4.6 G/DL (ref 3.5–5.2)
ALBUMIN/GLOB SERPL: 1.9 G/DL
ALP SERPL-CCNC: 78 U/L (ref 39–117)
ALT SERPL-CCNC: 21 U/L (ref 1–33)
APPEARANCE UR: CLEAR
AST SERPL-CCNC: 26 U/L (ref 1–32)
BACTERIA #/AREA URNS HPF: NORMAL /HPF
BACTERIA UR CULT: ABNORMAL
BACTERIA UR CULT: ABNORMAL
BASOPHILS # BLD AUTO: 0.08 10*3/MM3 (ref 0–0.2)
BASOPHILS NFR BLD AUTO: 1.3 % (ref 0–1.5)
BILIRUB SERPL-MCNC: 0.4 MG/DL (ref 0.2–1.2)
BILIRUB UR QL STRIP: NEGATIVE
BUN SERPL-MCNC: 21 MG/DL (ref 8–23)
BUN/CREAT SERPL: 20.2 (ref 7–25)
CALCIUM SERPL-MCNC: 9.4 MG/DL (ref 8.6–10.5)
CHLORIDE SERPL-SCNC: 103 MMOL/L (ref 98–107)
CHOLEST SERPL-MCNC: 202 MG/DL (ref 0–200)
CO2 SERPL-SCNC: 27.4 MMOL/L (ref 22–29)
COLOR UR: YELLOW
CREAT SERPL-MCNC: 1.04 MG/DL (ref 0.57–1)
EOSINOPHIL # BLD AUTO: 0.12 10*3/MM3 (ref 0–0.4)
EOSINOPHIL NFR BLD AUTO: 2 % (ref 0.3–6.2)
EPI CELLS #/AREA URNS HPF: NORMAL /HPF
ERYTHROCYTE [DISTWIDTH] IN BLOOD BY AUTOMATED COUNT: 13.7 % (ref 12.3–15.4)
GLOBULIN SER CALC-MCNC: 2.4 GM/DL
GLUCOSE SERPL-MCNC: 88 MG/DL (ref 65–99)
GLUCOSE UR QL: NEGATIVE
HCT VFR BLD AUTO: 47.1 % (ref 34–46.6)
HDLC SERPL-MCNC: 69 MG/DL (ref 40–60)
HGB BLD-MCNC: 15.6 G/DL (ref 12–15.9)
HGB UR QL STRIP: NEGATIVE
IMM GRANULOCYTES # BLD AUTO: 0.02 10*3/MM3 (ref 0–0.05)
IMM GRANULOCYTES NFR BLD AUTO: 0.3 % (ref 0–0.5)
KETONES UR QL STRIP: NEGATIVE
LDLC SERPL CALC-MCNC: 122 MG/DL (ref 0–100)
LEUKOCYTE ESTERASE UR QL STRIP: ABNORMAL
LYMPHOCYTES # BLD AUTO: 2.11 10*3/MM3 (ref 0.7–3.1)
LYMPHOCYTES NFR BLD AUTO: 35.5 % (ref 19.6–45.3)
MCH RBC QN AUTO: 30.2 PG (ref 26.6–33)
MCHC RBC AUTO-ENTMCNC: 33.1 G/DL (ref 31.5–35.7)
MCV RBC AUTO: 91.1 FL (ref 79–97)
MICRO URNS: ABNORMAL
MONOCYTES # BLD AUTO: 0.49 10*3/MM3 (ref 0.1–0.9)
MONOCYTES NFR BLD AUTO: 8.2 % (ref 5–12)
MUCOUS THREADS URNS QL MICRO: PRESENT /HPF
NEUTROPHILS # BLD AUTO: 3.12 10*3/MM3 (ref 1.7–7)
NEUTROPHILS NFR BLD AUTO: 52.7 % (ref 42.7–76)
NITRITE UR QL STRIP: NEGATIVE
NRBC BLD AUTO-RTO: 0 /100 WBC (ref 0–0.2)
PH UR STRIP: 5 [PH] (ref 5–7.5)
PLATELET # BLD AUTO: 346 10*3/MM3 (ref 140–450)
POTASSIUM SERPL-SCNC: 4.7 MMOL/L (ref 3.5–5.2)
PROT SERPL-MCNC: 7 G/DL (ref 6–8.5)
PROT UR QL STRIP: NEGATIVE
RBC # BLD AUTO: 5.17 10*6/MM3 (ref 3.77–5.28)
RBC #/AREA URNS HPF: NORMAL /HPF
SODIUM SERPL-SCNC: 141 MMOL/L (ref 136–145)
SP GR UR: 1.02 (ref 1–1.03)
TRIGL SERPL-MCNC: 54 MG/DL (ref 0–150)
TSH SERPL DL<=0.005 MIU/L-ACNC: 1.68 UIU/ML (ref 0.27–4.2)
URINALYSIS REFLEX: ABNORMAL
UROBILINOGEN UR STRIP-MCNC: 0.2 MG/DL (ref 0.2–1)
VLDLC SERPL CALC-MCNC: 10.8 MG/DL
WBC # BLD AUTO: 5.94 10*3/MM3 (ref 3.4–10.8)
WBC #/AREA URNS HPF: NORMAL /HPF

## 2019-11-08 ENCOUNTER — OFFICE VISIT (OUTPATIENT)
Dept: INTERNAL MEDICINE | Facility: CLINIC | Age: 64
End: 2019-11-08

## 2019-11-08 VITALS
HEIGHT: 66 IN | SYSTOLIC BLOOD PRESSURE: 110 MMHG | HEART RATE: 75 BPM | DIASTOLIC BLOOD PRESSURE: 70 MMHG | BODY MASS INDEX: 23.78 KG/M2 | OXYGEN SATURATION: 98 % | WEIGHT: 148 LBS

## 2019-11-08 DIAGNOSIS — Z00.00 HEALTHCARE MAINTENANCE: Primary | ICD-10-CM

## 2019-11-08 DIAGNOSIS — B00.1 RECURRENT COLD SORES: ICD-10-CM

## 2019-11-08 PROCEDURE — G0439 PPPS, SUBSEQ VISIT: HCPCS | Performed by: INTERNAL MEDICINE

## 2019-11-08 PROCEDURE — 93000 ELECTROCARDIOGRAM COMPLETE: CPT | Performed by: INTERNAL MEDICINE

## 2019-11-08 RX ORDER — VALACYCLOVIR HYDROCHLORIDE 500 MG/1
TABLET, FILM COATED ORAL
Refills: 6 | COMMUNITY
Start: 2019-10-14 | End: 2021-01-08 | Stop reason: SDDI

## 2019-11-11 RX ORDER — BUPROPION HYDROCHLORIDE 150 MG/1
150 TABLET ORAL EVERY MORNING
Qty: 90 TABLET | Refills: 0 | Status: SHIPPED | OUTPATIENT
Start: 2019-11-11 | End: 2020-02-21

## 2019-11-18 ENCOUNTER — HOSPITAL ENCOUNTER (OUTPATIENT)
Dept: MAMMOGRAPHY | Facility: HOSPITAL | Age: 64
Discharge: HOME | End: 2019-11-18
Admitting: INTERNAL MEDICINE

## 2019-11-18 DIAGNOSIS — Z12.31 VISIT FOR SCREENING MAMMOGRAM: ICD-10-CM

## 2019-11-18 PROCEDURE — 77067 SCR MAMMO BI INCL CAD: CPT

## 2019-11-18 PROCEDURE — 77063 BREAST TOMOSYNTHESIS BI: CPT

## 2019-11-23 ENCOUNTER — OFFICE VISIT (OUTPATIENT)
Dept: RETAIL CLINIC | Facility: CLINIC | Age: 64
End: 2019-11-23

## 2019-11-23 VITALS
HEART RATE: 108 BPM | DIASTOLIC BLOOD PRESSURE: 68 MMHG | OXYGEN SATURATION: 94 % | SYSTOLIC BLOOD PRESSURE: 114 MMHG | TEMPERATURE: 98.2 F | RESPIRATION RATE: 20 BRPM

## 2019-11-23 DIAGNOSIS — R06.89 DECREASED LUNG SOUNDS: Primary | ICD-10-CM

## 2019-11-23 DIAGNOSIS — J01.40 ACUTE PANSINUSITIS, RECURRENCE NOT SPECIFIED: ICD-10-CM

## 2019-11-23 PROCEDURE — 99213 OFFICE O/P EST LOW 20 MIN: CPT | Performed by: NURSE PRACTITIONER

## 2019-11-23 RX ORDER — AZITHROMYCIN 250 MG/1
TABLET, FILM COATED ORAL
Qty: 6 TABLET | Refills: 0 | Status: SHIPPED | OUTPATIENT
Start: 2019-11-23 | End: 2019-12-19

## 2019-11-23 RX ORDER — BROMPHENIRAMINE MALEATE, PSEUDOEPHEDRINE HYDROCHLORIDE, AND DEXTROMETHORPHAN HYDROBROMIDE 2; 30; 10 MG/5ML; MG/5ML; MG/5ML
5 SYRUP ORAL 4 TIMES DAILY PRN
Qty: 118 ML | Refills: 0 | Status: SHIPPED | OUTPATIENT
Start: 2019-11-23 | End: 2019-12-19

## 2019-11-23 RX ORDER — PREDNISONE 20 MG/1
20 TABLET ORAL DAILY
Qty: 5 TABLET | Refills: 0 | Status: SHIPPED | OUTPATIENT
Start: 2019-11-23 | End: 2019-11-28

## 2019-11-23 NOTE — PROGRESS NOTES
Subjective   Fred Farr is a 64 y.o. female.     URI    This is a new problem. The current episode started in the past 7 days. The problem has been gradually worsening. There has been no fever (chills, sweats mild). Associated symptoms include congestion, coughing (productive yellow drainage), headaches, rhinorrhea and sinus pain. Pertinent negatives include no ear pain, nausea, sore throat or vomiting. Associated symptoms comments: Decreased appetite  . Treatments tried: mucinex, advil, sudafed(12hr) The treatment provided no relief.        The following portions of the patient's history were reviewed and updated as appropriate: allergies, current medications, past family history, past medical history, past social history, past surgical history and problem list.    Review of Systems   HENT: Positive for congestion and rhinorrhea. Negative for ear pain and sore throat.    Respiratory: Positive for cough (productive yellow drainage).    Cardiovascular: Negative.    Gastrointestinal: Negative.  Negative for nausea and vomiting.   Musculoskeletal: Negative.    Skin: Negative.    Neurological: Positive for headache.       Objective   Physical Exam   Constitutional: She is cooperative. No distress.   HENT:   Head: Normocephalic.   Right Ear: Hearing, external ear and ear canal normal. Tympanic membrane is not erythematous.   Left Ear: Hearing, external ear and ear canal normal. Tympanic membrane is not erythematous.   Nose: Nose normal.   Mouth/Throat: Oropharyngeal exudate and posterior oropharyngeal erythema present. Tonsils are 2+ on the right. Tonsils are 2+ on the left.   Eyes: Conjunctivae, EOM and lids are normal. Pupils are equal, round, and reactive to light.   Neck: Trachea normal and full passive range of motion without pain.   Cardiovascular: Normal rate, regular rhythm and normal pulses.   Pulmonary/Chest: Effort normal. She has decreased breath sounds in the right lower field.   Lymphadenopathy:     She  has cervical adenopathy.        Right cervical: Superficial cervical adenopathy present.        Left cervical: Superficial cervical adenopathy present.   Neurological: She is alert.   Skin: Skin is warm. Capillary refill takes less than 2 seconds.   Psychiatric: She has a normal mood and affect. Her speech is normal and behavior is normal.   Vitals reviewed.        Assessment/Plan   Fred was seen today for uri.    Diagnoses and all orders for this visit:    Decreased lung sounds    Acute pansinusitis, recurrence not specified    Other orders  -     azithromycin (ZITHROMAX Z-EARLENE) 250 MG tablet; Take 2 tablets the first day, then 1 tablet daily for 4 days.  -     brompheniramine-pseudoephedrine-DM (BROMFED DM) 30-2-10 MG/5ML syrup; Take 5 mL by mouth 4 (Four) Times a Day As Needed for Allergies.  -     predniSONE (DELTASONE) 20 MG tablet; Take 1 tablet by mouth Daily for 5 days.

## 2019-12-19 ENCOUNTER — OFFICE VISIT (OUTPATIENT)
Dept: INTERNAL MEDICINE | Facility: CLINIC | Age: 64
End: 2019-12-19

## 2019-12-19 VITALS
WEIGHT: 147 LBS | TEMPERATURE: 97.8 F | RESPIRATION RATE: 18 BRPM | OXYGEN SATURATION: 96 % | BODY MASS INDEX: 23.63 KG/M2 | HEART RATE: 64 BPM | SYSTOLIC BLOOD PRESSURE: 104 MMHG | DIASTOLIC BLOOD PRESSURE: 80 MMHG | HEIGHT: 66 IN

## 2019-12-19 DIAGNOSIS — J30.9 ALLERGIC RHINITIS, UNSPECIFIED SEASONALITY, UNSPECIFIED TRIGGER: ICD-10-CM

## 2019-12-19 DIAGNOSIS — R05.9 COUGH: Primary | ICD-10-CM

## 2019-12-19 PROCEDURE — 99213 OFFICE O/P EST LOW 20 MIN: CPT | Performed by: NURSE PRACTITIONER

## 2019-12-19 RX ORDER — MONTELUKAST SODIUM 10 MG/1
10 TABLET ORAL NIGHTLY
Qty: 30 TABLET | Refills: 5 | Status: SHIPPED | OUTPATIENT
Start: 2019-12-19 | End: 2020-05-06

## 2019-12-19 RX ORDER — ALBUTEROL SULFATE 90 UG/1
2 AEROSOL, METERED RESPIRATORY (INHALATION) EVERY 4 HOURS PRN
Qty: 1 INHALER | Refills: 5 | Status: SHIPPED | OUTPATIENT
Start: 2019-12-19 | End: 2021-11-19

## 2019-12-19 NOTE — PROGRESS NOTES
Subjective   Fred Farr is a 64 y.o. female.   Chief Complaint   Patient presents with   • Cough     Vitals:    12/19/19 1114   BP: 104/80   Pulse: 64   Resp: 18   Temp: 97.8 °F (36.6 °C)   SpO2: 96%     No LMP recorded. Patient is postmenopausal.    Fred is a patient of Dr ugalde who is here for an acute visit     Cough   This is a new problem. Episode onset: 3 days  The problem has been unchanged. The cough is productive of sputum (clear and white ). Associated symptoms include nasal congestion, postnasal drip, rhinorrhea and wheezing (occasional ). Pertinent negatives include no chills, ear pain, fever, headaches, hemoptysis, sore throat or shortness of breath. The symptoms are aggravated by cold air. She has tried nothing (has taken singulair and used albuterol int he past but has not started it ) for the symptoms. Her past medical history is significant for environmental allergies.        The following portions of the patient's history were reviewed and updated as appropriate: allergies, current medications, past family history, past medical history, past social history, past surgical history and problem list.    Review of Systems   Constitutional: Negative for chills and fever.   HENT: Positive for postnasal drip and rhinorrhea. Negative for ear pain and sore throat.    Respiratory: Positive for cough and wheezing (occasional ). Negative for hemoptysis and shortness of breath.    Allergic/Immunologic: Positive for environmental allergies.   Neurological: Negative for headaches.       Objective   Physical Exam   Constitutional: Vital signs are normal. She appears well-developed and well-nourished. No distress.   HENT:   Right Ear: A middle ear effusion is present.   Left Ear: Tympanic membrane and ear canal normal.   Nose: Rhinorrhea present. No mucosal edema.   Mouth/Throat: Uvula is midline. Posterior oropharyngeal erythema present.   Wears hearing aids bilaterally    Cardiovascular: Normal rate, regular  rhythm and normal heart sounds.   Pulmonary/Chest: Effort normal.   Neurological: She is alert.       Assessment/Plan   Fred was seen today for cough.    Diagnoses and all orders for this visit:    Cough    Allergic rhinitis, unspecified seasonality, unspecified trigger    Other orders  -     albuterol sulfate  (90 Base) MCG/ACT inhaler; Inhale 2 puffs Every 4 (Four) Hours As Needed for Wheezing.  -     montelukast (SINGULAIR) 10 MG tablet; Take 1 tablet by mouth Every Night.      Recommend claritin otc   I refilled singulair and albuterol  Follow up if symptoms persist, worsen or if new symptoms develop

## 2020-02-21 RX ORDER — BUPROPION HYDROCHLORIDE 150 MG/1
150 TABLET ORAL EVERY MORNING
Qty: 90 TABLET | Refills: 0 | Status: SHIPPED | OUTPATIENT
Start: 2020-02-21 | End: 2020-05-22

## 2020-03-09 ENCOUNTER — TELEPHONE (OUTPATIENT)
Dept: INTERNAL MEDICINE | Facility: CLINIC | Age: 65
End: 2020-03-09

## 2020-03-09 NOTE — TELEPHONE ENCOUNTER
Per dr ugalde she is to be seen in office. Spoke with pt she states she actually feels better and will call if needs

## 2020-03-09 NOTE — TELEPHONE ENCOUNTER
Pt LM that she thinks she may be getting a UTI and would like something to be called in, said she spoke to you this weekend. She is feeling better but because she was not drinking enough fluids she is getting a UTI. She is asking for Cipro

## 2020-03-10 ENCOUNTER — OFFICE VISIT (OUTPATIENT)
Dept: INTERNAL MEDICINE | Facility: CLINIC | Age: 65
End: 2020-03-10

## 2020-03-10 VITALS
TEMPERATURE: 98.3 F | BODY MASS INDEX: 23.95 KG/M2 | HEIGHT: 66 IN | SYSTOLIC BLOOD PRESSURE: 96 MMHG | DIASTOLIC BLOOD PRESSURE: 66 MMHG | WEIGHT: 149 LBS | HEART RATE: 84 BPM

## 2020-03-10 DIAGNOSIS — N39.0 URINARY TRACT INFECTION WITH HEMATURIA, SITE UNSPECIFIED: Primary | ICD-10-CM

## 2020-03-10 DIAGNOSIS — R31.9 URINARY TRACT INFECTION WITH HEMATURIA, SITE UNSPECIFIED: Primary | ICD-10-CM

## 2020-03-10 DIAGNOSIS — J06.9 UPPER RESPIRATORY TRACT INFECTION, UNSPECIFIED TYPE: ICD-10-CM

## 2020-03-10 LAB
BILIRUB BLD-MCNC: ABNORMAL MG/DL
CLARITY, POC: CLEAR
COLOR UR: ABNORMAL
GLUCOSE UR STRIP-MCNC: NEGATIVE MG/DL
KETONES UR QL: ABNORMAL
LEUKOCYTE EST, POC: NEGATIVE
NITRITE UR-MCNC: NEGATIVE MG/ML
PH UR: 6 [PH] (ref 5–8)
PROT UR STRIP-MCNC: ABNORMAL MG/DL
RBC # UR STRIP: ABNORMAL /UL
SP GR UR: 1.02 (ref 1–1.03)
UROBILINOGEN UR QL: NORMAL

## 2020-03-10 PROCEDURE — 81003 URINALYSIS AUTO W/O SCOPE: CPT | Performed by: INTERNAL MEDICINE

## 2020-03-10 PROCEDURE — 99213 OFFICE O/P EST LOW 20 MIN: CPT | Performed by: INTERNAL MEDICINE

## 2020-03-10 RX ORDER — BENZONATATE 100 MG/1
100 CAPSULE ORAL 3 TIMES DAILY PRN
Qty: 30 CAPSULE | Refills: 1 | Status: SHIPPED | OUTPATIENT
Start: 2020-03-10 | End: 2021-11-19

## 2020-03-10 RX ORDER — SACCHAROMYCES BOULARDII 250 MG
250 CAPSULE ORAL 2 TIMES DAILY
Qty: 20 CAPSULE | Refills: 0 | Status: SHIPPED | OUTPATIENT
Start: 2020-03-10 | End: 2021-01-08 | Stop reason: SDDI

## 2020-03-10 RX ORDER — AMOXICILLIN AND CLAVULANATE POTASSIUM 875; 125 MG/1; MG/1
1 TABLET, FILM COATED ORAL 2 TIMES DAILY
Qty: 14 TABLET | Refills: 0 | Status: SHIPPED | OUTPATIENT
Start: 2020-03-10 | End: 2020-11-16

## 2020-03-10 NOTE — PROGRESS NOTES
"Chief Complaint   Patient presents with   • Nocturia   • Cough       History of Present Illness   Fred Farr is a 65 y.o. female presents for acute needs. Patient reports that on Saturday she felt \"scary sick\". She called weekend on call and expressed symptoms of flu. She was advised that best way to treat would be to test for flu but she simply did not feel able to go to be tested. She was empyrically treated w/ xofluza. She has had substantial improvement from Saturday to today. She had concentrated urine w/ spasm and discomfort for last 24-48 hours. Cough that started yesterday. Decreased hydration while feeling ill. She is now drinking cranberry and water.     The following portions of the patient's history were reviewed and updated as appropriate: allergies, current medications, past family history, past medical history, past social history, past surgical history and problem list.  Current Outpatient Medications on File Prior to Visit   Medication Sig Dispense Refill   • acyclovir (ZOVIRAX) 5 % ointment Apply to affected area tid prn 15 g 0   • albuterol sulfate  (90 Base) MCG/ACT inhaler Inhale 2 puffs Every 4 (Four) Hours As Needed for Wheezing. 1 inhaler 5   • aspirin 81 MG EC tablet Take 81 mg by mouth Daily.     • buPROPion XL (WELLBUTRIN XL) 150 MG 24 hr tablet TAKE 1 TABLET BY MOUTH EVERY MORNING 90 tablet 0   • montelukast (SINGULAIR) 10 MG tablet Take 1 tablet by mouth Every Night. 30 tablet 5   • valACYclovir (VALTREX) 500 MG tablet TK 1 T PO QD PRN  6     No current facility-administered medications on file prior to visit.      Review of Systems   Constitutional: Negative.    HENT: Positive for rhinorrhea and sore throat.    Eyes: Negative.    Respiratory: Positive for cough.    Cardiovascular: Negative.    Gastrointestinal: Negative.    Endocrine: Negative.    Genitourinary: Positive for frequency and urgency.   Musculoskeletal: Negative.    Skin: Negative.    Allergic/Immunologic: " "Negative.    Neurological: Negative.    Hematological: Negative.    Psychiatric/Behavioral: Negative.        Objective   Physical Exam   Constitutional: She is oriented to person, place, and time. She appears well-developed and well-nourished.   HENT:   Head: Normocephalic and atraumatic.   Right Ear: External ear normal.   Left Ear: External ear normal.   Mouth/Throat: Oropharynx is clear and moist.   Eyes: Pupils are equal, round, and reactive to light. Conjunctivae and EOM are normal.   Neck: Normal range of motion.   Cardiovascular: Normal rate, regular rhythm, normal heart sounds and intact distal pulses.   Pulmonary/Chest: Effort normal and breath sounds normal.   Abdominal: Soft. Bowel sounds are normal.   Musculoskeletal: Normal range of motion.   Neurological: She is alert and oriented to person, place, and time.   Skin: Skin is warm and dry.   Psychiatric: She has a normal mood and affect. Her behavior is normal. Judgment and thought content normal.   Nursing note and vitals reviewed.       BP 96/66   Pulse 84   Temp 98.3 °F (36.8 °C)   Ht 167.6 cm (66\")   Wt 67.6 kg (149 lb)   BMI 24.05 kg/m²     Assessment/Plan   Diagnoses and all orders for this visit:    Urinary tract infection with hematuria, site unspecified  -     Urine Culture - Urine, Urine, Clean Catch  -     POC Urinalysis Dipstick, Automated    Upper respiratory tract infection, unspecified type    Other orders  -     amoxicillin-clavulanate (AUGMENTIN) 875-125 MG per tablet; Take 1 tablet by mouth 2 (Two) Times a Day.  -     saccharomyces boulardii (FLORASTOR) 250 MG capsule; Take 1 capsule by mouth 2 (Two) Times a Day.      Patient w/ possible flu and now a urinary tract infection. She also has broncho spasm v additional infection. Will empyrically treat w/ augmentin bid x 7 d w/ florastor. Prn tessalon perles. She will hydrate fully.            "

## 2020-03-12 LAB
BACTERIA UR CULT: NORMAL
BACTERIA UR CULT: NORMAL

## 2020-03-24 ENCOUNTER — TELEPHONE (OUTPATIENT)
Dept: INTERNAL MEDICINE | Facility: CLINIC | Age: 65
End: 2020-03-24

## 2020-03-24 RX ORDER — OFLOXACIN 3 MG/ML
1 SOLUTION/ DROPS OPHTHALMIC 4 TIMES DAILY
Qty: 5 ML | Refills: 0 | Status: SHIPPED | OUTPATIENT
Start: 2020-03-24 | End: 2021-11-19

## 2020-03-24 NOTE — TELEPHONE ENCOUNTER
Ocuflox ophthalmic   1 drop right eye only q 6hours.  Throw away all products that have come in contact w/ her eye.   To eye specialist if any visual changes occur or symptoms worsen at any time or fail to improve after 72 hours.

## 2020-05-06 RX ORDER — MONTELUKAST SODIUM 10 MG/1
10 TABLET ORAL NIGHTLY
Qty: 30 TABLET | Refills: 5 | Status: SHIPPED | OUTPATIENT
Start: 2020-05-06 | End: 2020-08-21 | Stop reason: SDUPTHER

## 2020-05-22 RX ORDER — BUPROPION HYDROCHLORIDE 150 MG/1
150 TABLET ORAL EVERY MORNING
Qty: 90 TABLET | Refills: 2 | Status: SHIPPED | OUTPATIENT
Start: 2020-05-22 | End: 2020-08-21 | Stop reason: SDUPTHER

## 2020-08-11 ENCOUNTER — TELEMEDICINE (OUTPATIENT)
Dept: FAMILY MEDICINE CLINIC | Facility: TELEHEALTH | Age: 65
End: 2020-08-11

## 2020-08-11 DIAGNOSIS — L23.7 POISON IVY: Primary | ICD-10-CM

## 2020-08-11 PROCEDURE — 99213 OFFICE O/P EST LOW 20 MIN: CPT | Performed by: NURSE PRACTITIONER

## 2020-08-11 RX ORDER — PREDNISONE 10 MG/1
TABLET ORAL
Qty: 21 TABLET | Refills: 0 | OUTPATIENT
Start: 2020-08-11 | End: 2021-01-08 | Stop reason: SDDI

## 2020-08-12 NOTE — PROGRESS NOTES
CHIEF COMPLAINT  Chief Complaint   Patient presents with   • Rash       --Symptoms: no  --In the last 14 day, have you had contact with anyone who is ill, has shown any of the symptoms listed above and/or has been diagnosed with 2019 Novel Coronavirus? This includes any immediate household member but excludes any patients with whom you have been in contact within your normal work duties wearing proper PPE, if you are a healthcare worker:no    HPI  Fred Farr is a 65 y.o. female  presents with complaint of rash on her back. Reports symptoms started 5 days ago. Reports itching. Denies any pain or tingling. Reports she was working in the yard 5 days ago. Denies any drainage. Denies any burning. Reports she had a shingles vaccine 1-2020. Pt reports she has some mild itching in her scalp as well.     Review of Systems   Constitutional: Negative.  Negative for chills, fatigue and fever.   HENT: Negative.    Eyes: Negative.    Respiratory: Negative.    Cardiovascular: Negative.    Gastrointestinal: Negative.  Negative for nausea and vomiting.   Endocrine: Negative.    Genitourinary: Negative.    Musculoskeletal: Negative.    Skin: Positive for rash.        Itchy rash on her back and right arm   Allergic/Immunologic: Negative.    Neurological: Negative.    Hematological: Negative.    Psychiatric/Behavioral: Negative.        Past Medical History:   Diagnosis Date   • Anxiety    • Depression    • Low blood pressure    • Right bundle branch block    • Skin cancer        Family History   Problem Relation Age of Onset   • Heart disease Mother    • Arthritis Mother    • Cancer Father         Colon cancer   • Macular degeneration Father    • Asthma Sister    • Hypothyroidism Brother    • Macular degeneration Brother    • Thyroid disease Brother    • Stroke Paternal Grandfather    • Malig Hyperthermia Neg Hx        Social History     Socioeconomic History   • Marital status:      Spouse name: Not on file   • Number of  children: Not on file   • Years of education: Not on file   • Highest education level: Not on file   Tobacco Use   • Smoking status: Never Smoker   • Smokeless tobacco: Never Used   Substance and Sexual Activity   • Alcohol use: Yes     Comment: SOCIAL   • Drug use: No   • Sexual activity: Defer         There were no vitals taken for this visit.    PHYSICAL EXAM(patient guided exam)  Physical Exam   Constitutional: She is oriented to person, place, and time. She appears well-developed and well-nourished. No distress.   HENT:   Head: Normocephalic and atraumatic.   Right Ear: Hearing and external ear normal.   Left Ear: Hearing and external ear normal.   Nose: Nose normal.   Mouth/Throat: Mouth/Lips are normal.Oropharynx is clear and moist.   Eyes: Conjunctivae and lids are normal.   Neck: Neck normal appearance.  Pulmonary/Chest: Effort normal. No stridor.  No respiratory distress.  Abdominal: Abdomen appears normal.   Neurological: She is alert and oriented to person, place, and time.   Skin: Skin is warm and dry. Rash noted. Rash is maculopapular.   Noted a maculopapular rash to mid lower left and right back. No drainage noted. Note small area on her right forearm. No lesions noted in the scalp Her skin appears normal.  Psychiatric: She has a normal mood and affect. Her speech is normal and behavior is normal.       Results for orders placed or performed in visit on 03/10/20   Urine Culture - Urine, Urine, Clean Catch   Result Value Ref Range    Urine Culture Final report     Result 1 Comment    POC Urinalysis Dipstick, Automated   Result Value Ref Range    Color Dark Yellow Yellow, Straw, Dark Yellow, [place]    Clarity, UA Clear Clear    Specific Gravity  1.025 1.005 - 1.030    pH, Urine 6.0 5.0 - 8.0    Leukocytes Negative Negative    Nitrite, UA Negative Negative    Protein,  mg/dL (A) Negative mg/dL    Glucose, UA Negative Negative, 1000 mg/dL (3+) mg/dL    Ketones, UA 15 mg/dL (A) Negative     Urobilinogen, UA Normal Normal    Bilirubin Small (1+) (A) Negative    Blood, UA Moderate (A) Negative       Fred was seen today for rash.    Diagnoses and all orders for this visit:    Poison ivy    Other orders  -     triamcinolone (KENALOG) 0.1 % ointment; Apply  topically to the appropriate area as directed 2 (Two) Times a Day for 7 days.  -     predniSONE (DELTASONE) 10 MG (48) tablet pack; Take as directed    advised patient if symptoms persist f/u with PCP or go to ER for any worsening symptoms   Claritin for itching.     **if at any time, experiences fever AND/OR worsening cough AND/OR shortness of breath, has been advised to go to nearest urgent care or emergency department for evaluation AND/OR testing    **if no improvement, but not worsening, may schedule a f/u virtual visit or E-visit      FOLLOW-UP  with PCP/Virtual Care if no improvement or Urgent Care/Emergency Department if worsening of symptoms    Patient verbalizes understanding of medication dosage, comfort measures, instructions for treatment and follow-up.    JORDI Castaneda  08/11/2020  1:02 AM    This visit was performed via Telehealth.  This patient has been instructed to follow-up with their primary care provider if their symptoms worsen or the treatment provided does not resolve their illness.

## 2020-08-12 NOTE — PATIENT INSTRUCTIONS
Poison Ivy Dermatitis  Poison ivy dermatitis is inflammation of the skin that is caused by chemicals in the leaves of the poison ivy plant. The skin reaction often involves redness, swelling, blisters, and extreme itching.  What are the causes?  This condition is caused by a chemical (urushiol) found in the sap of the poison ivy plant. This chemical is sticky and can be easily spread to people, animals, and objects. You can get poison ivy dermatitis by:  · Having direct contact with a poison ivy plant.  · Touching animals, other people, or objects that have come in contact with poison ivy and have the chemical on them.  What increases the risk?  This condition is more likely to develop in people who:  · Are outdoors often in wooded or marshy areas.  · Go outdoors without wearing protective clothing, such as closed shoes, long pants, and a long-sleeved shirt.  What are the signs or symptoms?  Symptoms of this condition include:  · Redness of the skin.  · Extreme itching.  · A rash that often includes bumps and blisters. The rash usually appears 48 hours after exposure, if you have been exposed before. If this is the first time you have been exposed, the rash may not appear until a week after exposure.  · Swelling. This may occur if the reaction is more severe.  Symptoms usually last for 1-2 weeks. However, the first time you develop this condition, symptoms may last 3-4 weeks.  How is this diagnosed?  This condition may be diagnosed based on your symptoms and a physical exam. Your health care provider may also ask you about any recent outdoor activity.  How is this treated?  Treatment for this condition will vary depending on how severe it is. Treatment may include:  · Hydrocortisone cream or calamine lotion to relieve itching.  · Oatmeal baths to soothe the skin.  · Medicines, such as over-the-counter antihistamine tablets.  · Oral steroid medicine, for more severe reactions.  Follow these instructions at  home:  Medicines  · Take or apply over-the-counter and prescription medicines only as told by your health care provider.  · Use hydrocortisone cream or calamine lotion as needed to soothe the skin and relieve itching.  General instructions  · Do not scratch or rub your skin.  · Apply a cold, wet cloth (cold compress) to the affected areas or take baths in cool water. This will help with itching. Avoid hot baths and showers.  · Take oatmeal baths as needed. Use colloidal oatmeal. You can get this at your local pharmacy or grocery store. Follow the instructions on the packaging.  · While you have the rash, wash clothes right after you wear them.  · Keep all follow-up visits as told by your health care provider. This is important.  How is this prevented?    · Learn to identify the poison ivy plant and avoid contact with the plant. This plant can be recognized by the number of leaves. Generally, poison ivy has three leaves with flowering branches on a single stem. The leaves are typically glossy, and they have jagged edges that come to a point at the front.  · If you have been exposed to poison ivy, thoroughly wash with soap and water right away. You have about 30 minutes to remove the plant resin before it will cause the rash. Be sure to wash under your fingernails, because any plant resin there will continue to spread the rash.  · When hiking or camping, wear clothes that will help you to avoid exposure on the skin. This includes long pants, a long-sleeved shirt, tall socks, and hiking boots. You can also apply preventive lotion to your skin to help limit exposure.  · If you suspect that your clothes or outdoor gear came in contact with poison ivy, rinse them off outside with a garden hose before you bring them inside your house.  · When doing yard work or gardening, wear gloves, long sleeves, long pants, and boots. Wash your garden tools and gloves if they come in contact with poison ivy.  · If you suspect that your  pet has come into contact with poison ivy, wash him or her with pet shampoo and water. Make sure to wear gloves while washing your pet.  Contact a health care provider if you have:  · Open sores in the rash area.  · More redness, swelling, or pain in the affected area.  · Redness that spreads beyond the rash area.  · Fluid, blood, or pus coming from the affected area.  · A fever.  · A rash over a large area of your body.  · A rash on your eyes, mouth, or genitals.  · A rash that does not improve after a few weeks.  Get help right away if:  · Your face swells or your eyes swell shut.  · You have trouble breathing.  · You have trouble swallowing.  These symptoms may represent a serious problem that is an emergency. Do not wait to see if the symptoms will go away. Get medical help right away. Call your local emergency services (911 in the [place] Do not drive yourself to the hospital.  Summary  · Poison ivy dermatitis is inflammation of the skin that is caused by chemicals in the leaves of the poison ivy plant.  · Symptoms of this condition include redness, itching, a rash, and swelling.  · Do not scratch or rub your skin.  · Take or apply over-the-counter and prescription medicines only as told by your health care provider.  This information is not intended to replace advice given to you by your health care provider. Make sure you discuss any questions you have with your health care provider.  Document Released: 12/15/2001 Document Revised: 04/10/2020 Document Reviewed: 12/13/2019  Elsevier Patient Education © 2020 [employer]  00

## 2020-08-21 RX ORDER — BUPROPION HYDROCHLORIDE 150 MG/1
150 TABLET ORAL EVERY MORNING
Qty: 90 TABLET | Refills: 2 | Status: SHIPPED | OUTPATIENT
Start: 2020-08-21 | End: 2021-09-20

## 2020-08-21 RX ORDER — BUPROPION HYDROCHLORIDE 150 MG/1
150 TABLET ORAL EVERY MORNING
Qty: 90 TABLET | Refills: 2 | Status: SHIPPED | OUTPATIENT
Start: 2020-08-21 | End: 2020-08-21 | Stop reason: SDUPTHER

## 2020-08-21 RX ORDER — MONTELUKAST SODIUM 10 MG/1
10 TABLET ORAL NIGHTLY
Qty: 90 TABLET | Refills: 3 | Status: SHIPPED | OUTPATIENT
Start: 2020-08-21 | End: 2020-08-21 | Stop reason: SDUPTHER

## 2020-08-21 RX ORDER — MONTELUKAST SODIUM 10 MG/1
10 TABLET ORAL NIGHTLY
Qty: 90 TABLET | Refills: 3 | Status: SHIPPED | OUTPATIENT
Start: 2020-08-21 | End: 2021-07-23

## 2020-09-11 ENCOUNTER — TELEPHONE (OUTPATIENT)
Dept: INTERNAL MEDICINE | Facility: CLINIC | Age: 65
End: 2020-09-11

## 2020-09-11 NOTE — TELEPHONE ENCOUNTER
Recent home health revealed moderate problem in left leg blood flow. Patient wants to know if she needs to come in before her scheduled visit in November.    Please call patient back at [phone].

## 2020-09-25 ENCOUNTER — OFFICE VISIT (OUTPATIENT)
Dept: INTERNAL MEDICINE | Facility: CLINIC | Age: 65
End: 2020-09-25

## 2020-09-25 VITALS
HEIGHT: 66 IN | HEART RATE: 74 BPM | WEIGHT: 148 LBS | SYSTOLIC BLOOD PRESSURE: 106 MMHG | DIASTOLIC BLOOD PRESSURE: 66 MMHG | BODY MASS INDEX: 23.78 KG/M2

## 2020-09-25 DIAGNOSIS — R26.81 GAIT INSTABILITY: Primary | ICD-10-CM

## 2020-09-25 PROCEDURE — 99214 OFFICE O/P EST MOD 30 MIN: CPT | Performed by: INTERNAL MEDICINE

## 2020-09-25 PROCEDURE — 90694 VACC AIIV4 NO PRSRV 0.5ML IM: CPT | Performed by: INTERNAL MEDICINE

## 2020-09-25 PROCEDURE — G0008 ADMIN INFLUENZA VIRUS VAC: HCPCS | Performed by: INTERNAL MEDICINE

## 2020-09-25 NOTE — PROGRESS NOTES
Chief Complaint   Patient presents with   • reduced pulse per home health.     left leg   • Other       History of Present Illness   Fred Farr is a 65 y.o. female presents for acute needs. She was seen by a home health aid through insurance company for screening. She was noted to have reduced something on the left leg. (pulse ox?). Patient does not have symptoms of claudication. She is normotensive, does not have hyperlipidemia, she has not ever smoked.   She does report 2 falls in the past year. One when going down stairs without using a railing. One when gardening and became off balance.     The following portions of the patient's history were reviewed and updated as appropriate: allergies, current medications, past family history, past medical history, past social history, past surgical history and problem list.  Current Outpatient Medications on File Prior to Visit   Medication Sig Dispense Refill   • acyclovir (ZOVIRAX) 5 % ointment Apply to affected area tid prn 15 g 0   • albuterol sulfate  (90 Base) MCG/ACT inhaler Inhale 2 puffs Every 4 (Four) Hours As Needed for Wheezing. 1 inhaler 5   • aspirin 81 MG EC tablet Take 81 mg by mouth Daily.     • buPROPion XL (WELLBUTRIN XL) 150 MG 24 hr tablet Take 1 tablet by mouth Every Morning. 90 tablet 2   • montelukast (SINGULAIR) 10 MG tablet Take 1 tablet by mouth Every Night. 90 tablet 3   • ofloxacin (Ocuflox) 0.3 % ophthalmic solution Administer 1 drop to the right eye 4 (Four) Times a Day. 5 mL 0   • saccharomyces boulardii (FLORASTOR) 250 MG capsule Take 1 capsule by mouth 2 (Two) Times a Day. 20 capsule 0   • valACYclovir (VALTREX) 500 MG tablet TK 1 T PO QD PRN  6   • amoxicillin-clavulanate (AUGMENTIN) 875-125 MG per tablet Take 1 tablet by mouth 2 (Two) Times a Day. 14 tablet 0   • benzonatate (TESSALON PERLES) 100 MG capsule Take 1 capsule by mouth 3 (Three) Times a Day As Needed for Cough. 30 capsule 1   • predniSONE (DELTASONE) 10 MG (48)  "tablet pack Take as directed 21 tablet 0     No current facility-administered medications on file prior to visit.      Review of Systems   Constitutional: Negative.    HENT: Negative.    Eyes: Negative.    Respiratory: Negative.    Cardiovascular: Negative.    Gastrointestinal: Negative.    Endocrine: Negative.    Genitourinary: Negative.    Musculoskeletal: Negative.    Skin: Negative.    Allergic/Immunologic: Negative.    Neurological: Negative.    Hematological: Negative.    Psychiatric/Behavioral: Negative.        Objective   Physical Exam  Vitals signs and nursing note reviewed.   Constitutional:       Appearance: Normal appearance.   HENT:      Head: Normocephalic and atraumatic.      Right Ear: Tympanic membrane normal.      Left Ear: Tympanic membrane normal.      Nose: Nose normal.      Mouth/Throat:      Mouth: Mucous membranes are dry.   Eyes:      Extraocular Movements: Extraocular movements intact.      Pupils: Pupils are equal, round, and reactive to light.   Neck:      Musculoskeletal: Normal range of motion and neck supple.   Cardiovascular:      Rate and Rhythm: Normal rate and regular rhythm.      Pulses: Normal pulses.      Heart sounds: Normal heart sounds.   Pulmonary:      Effort: Pulmonary effort is normal.      Breath sounds: Normal breath sounds.   Abdominal:      General: Abdomen is flat.      Palpations: Abdomen is soft.   Musculoskeletal: Normal range of motion.   Skin:     General: Skin is warm and dry.   Neurological:      General: No focal deficit present.      Mental Status: She is alert and oriented to person, place, and time.   Psychiatric:         Mood and Affect: Mood normal.         Behavior: Behavior normal.         Thought Content: Thought content normal.         Judgment: Judgment normal.          /66   Pulse 74   Ht 167.6 cm (66\")   Wt 67.1 kg (148 lb)   BMI 23.89 kg/m²     Assessment/Plan   Diagnoses and all orders for this visit:    Gait instability    Other " orders  -     Fluad Quad >65 years      Patient w/ vascular concerns. She has 2+ dorsalis pedis with no vascular claudication symptoms or risk factors. She will get vascular screening. She will work on gait and posture. Offer PT and this is declined by the patient. She will receive a flu vac today. She will f/u in november as scheduled.

## 2020-10-02 ENCOUNTER — TRANSCRIBE ORDERS (OUTPATIENT)
Dept: ADMINISTRATIVE | Facility: HOSPITAL | Age: 65
End: 2020-10-02

## 2020-10-02 DIAGNOSIS — Z13.6 ENCOUNTER FOR SCREENING FOR VASCULAR DISEASE: Primary | ICD-10-CM

## 2020-10-19 ENCOUNTER — HOSPITAL ENCOUNTER (OUTPATIENT)
Dept: CARDIOLOGY | Facility: HOSPITAL | Age: 65
Discharge: HOME | End: 2020-10-19
Admitting: SURGERY

## 2020-10-19 VITALS
DIASTOLIC BLOOD PRESSURE: 64 MMHG | BODY MASS INDEX: 22.76 KG/M2 | HEIGHT: 67 IN | SYSTOLIC BLOOD PRESSURE: 120 MMHG | OXYGEN SATURATION: 100 % | WEIGHT: 145 LBS | HEART RATE: 58 BPM

## 2020-10-19 DIAGNOSIS — Z13.6 ENCOUNTER FOR SCREENING FOR VASCULAR DISEASE: ICD-10-CM

## 2020-10-19 LAB
BH CV ECHO MEAS - DIST AO DIAM: 1.44 CM
BH CV VAS BP LEFT ARM: NORMAL MMHG
BH CV VAS BP RIGHT ARM: NORMAL MMHG
BH CV XLRA MEAS - MID AO DIAM: 1.73 CM
BH CV XLRA MEAS - PAD LEFT ABI DP: 1.08
BH CV XLRA MEAS - PAD LEFT ABI PT: 1.02
BH CV XLRA MEAS - PAD LEFT ARM: 112 MMHG
BH CV XLRA MEAS - PAD LEFT LEG DP: 130 MMHG
BH CV XLRA MEAS - PAD LEFT LEG PT: 122 MMHG
BH CV XLRA MEAS - PAD RIGHT ABI DP: 1.05
BH CV XLRA MEAS - PAD RIGHT ABI PT: 1.08
BH CV XLRA MEAS - PAD RIGHT ARM: 120 MMHG
BH CV XLRA MEAS - PAD RIGHT LEG DP: 126 MMHG
BH CV XLRA MEAS - PAD RIGHT LEG PT: 130 MMHG
BH CV XLRA MEAS - PROX AO DIAM: 2.06 CM
BH CV XLRA MEAS LEFT ICA/CCA RATIO: 0.96
BH CV XLRA MEAS LEFT MID CCA PSV: NORMAL CM/SEC
BH CV XLRA MEAS LEFT MID ICA PSV: NORMAL CM/SEC
BH CV XLRA MEAS LEFT PROX ECA PSV: NORMAL CM/SEC
BH CV XLRA MEAS RIGHT ICA/CCA RATIO: 0.96
BH CV XLRA MEAS RIGHT MID CCA PSV: NORMAL CM/SEC
BH CV XLRA MEAS RIGHT MID ICA PSV: NORMAL CM/SEC
BH CV XLRA MEAS RIGHT PROX ECA PSV: NORMAL CM/SEC

## 2020-10-19 PROCEDURE — 93799 UNLISTED CV SVC/PROCEDURE: CPT

## 2020-11-05 DIAGNOSIS — Z00.00 HEALTHCARE MAINTENANCE: Primary | ICD-10-CM

## 2020-11-12 LAB
ALBUMIN SERPL-MCNC: 4.4 G/DL (ref 3.5–5.2)
ALBUMIN/GLOB SERPL: 2 G/DL
ALP SERPL-CCNC: 86 U/L (ref 39–117)
ALT SERPL-CCNC: 18 U/L (ref 1–33)
APPEARANCE UR: CLEAR
AST SERPL-CCNC: 18 U/L (ref 1–32)
BACTERIA #/AREA URNS HPF: NORMAL /HPF
BASOPHILS # BLD AUTO: 0.06 10*3/MM3 (ref 0–0.2)
BASOPHILS NFR BLD AUTO: 0.9 % (ref 0–1.5)
BILIRUB SERPL-MCNC: 0.3 MG/DL (ref 0–1.2)
BILIRUB UR QL STRIP: NEGATIVE
BUN SERPL-MCNC: 27 MG/DL (ref 8–23)
BUN/CREAT SERPL: 26.5 (ref 7–25)
CALCIUM SERPL-MCNC: 9.6 MG/DL (ref 8.6–10.5)
CASTS URNS MICRO: NORMAL
CHLORIDE SERPL-SCNC: 104 MMOL/L (ref 98–107)
CHOLEST SERPL-MCNC: 200 MG/DL (ref 0–200)
CO2 SERPL-SCNC: 26.4 MMOL/L (ref 22–29)
COLOR UR: YELLOW
CREAT SERPL-MCNC: 1.02 MG/DL (ref 0.57–1)
EOSINOPHIL # BLD AUTO: 0.1 10*3/MM3 (ref 0–0.4)
EOSINOPHIL NFR BLD AUTO: 1.5 % (ref 0.3–6.2)
EPI CELLS #/AREA URNS HPF: NORMAL /HPF
ERYTHROCYTE [DISTWIDTH] IN BLOOD BY AUTOMATED COUNT: 13.3 % (ref 12.3–15.4)
GLOBULIN SER CALC-MCNC: 2.2 GM/DL
GLUCOSE SERPL-MCNC: 82 MG/DL (ref 65–99)
GLUCOSE UR QL: NEGATIVE
HCT VFR BLD AUTO: 49.8 % (ref 34–46.6)
HDLC SERPL-MCNC: 75 MG/DL (ref 40–60)
HGB BLD-MCNC: 16 G/DL (ref 12–15.9)
HGB UR QL STRIP: NEGATIVE
IMM GRANULOCYTES # BLD AUTO: 0.02 10*3/MM3 (ref 0–0.05)
IMM GRANULOCYTES NFR BLD AUTO: 0.3 % (ref 0–0.5)
KETONES UR QL STRIP: NEGATIVE
LDLC SERPL CALC-MCNC: 114 MG/DL (ref 0–100)
LEUKOCYTE ESTERASE UR QL STRIP: NEGATIVE
LYMPHOCYTES # BLD AUTO: 2.12 10*3/MM3 (ref 0.7–3.1)
LYMPHOCYTES NFR BLD AUTO: 31.1 % (ref 19.6–45.3)
MCH RBC QN AUTO: 29.3 PG (ref 26.6–33)
MCHC RBC AUTO-ENTMCNC: 32.1 G/DL (ref 31.5–35.7)
MCV RBC AUTO: 91 FL (ref 79–97)
MONOCYTES # BLD AUTO: 0.56 10*3/MM3 (ref 0.1–0.9)
MONOCYTES NFR BLD AUTO: 8.2 % (ref 5–12)
NEUTROPHILS # BLD AUTO: 3.95 10*3/MM3 (ref 1.7–7)
NEUTROPHILS NFR BLD AUTO: 58 % (ref 42.7–76)
NITRITE UR QL STRIP: NEGATIVE
NRBC BLD AUTO-RTO: 0 /100 WBC (ref 0–0.2)
PH UR STRIP: <=5 [PH] (ref 5–8)
PLATELET # BLD AUTO: 332 10*3/MM3 (ref 140–450)
POTASSIUM SERPL-SCNC: 5 MMOL/L (ref 3.5–5.2)
PROT SERPL-MCNC: 6.6 G/DL (ref 6–8.5)
PROT UR QL STRIP: NEGATIVE
RBC # BLD AUTO: 5.47 10*6/MM3 (ref 3.77–5.28)
RBC #/AREA URNS HPF: NORMAL /HPF
SODIUM SERPL-SCNC: 142 MMOL/L (ref 136–145)
SP GR UR: 1.02 (ref 1–1.03)
TRIGL SERPL-MCNC: 59 MG/DL (ref 0–150)
TSH SERPL DL<=0.005 MIU/L-ACNC: 2.38 UIU/ML (ref 0.27–4.2)
UROBILINOGEN UR STRIP-MCNC: NORMAL MG/DL
VLDLC SERPL CALC-MCNC: 11 MG/DL (ref 5–40)
WBC # BLD AUTO: 6.81 10*3/MM3 (ref 3.4–10.8)
WBC #/AREA URNS HPF: NORMAL /HPF

## 2020-11-16 ENCOUNTER — OFFICE VISIT (OUTPATIENT)
Dept: INTERNAL MEDICINE | Facility: CLINIC | Age: 65
End: 2020-11-16

## 2020-11-16 VITALS
DIASTOLIC BLOOD PRESSURE: 62 MMHG | OXYGEN SATURATION: 96 % | BODY MASS INDEX: 23.14 KG/M2 | SYSTOLIC BLOOD PRESSURE: 110 MMHG | HEART RATE: 72 BPM | HEIGHT: 66 IN | WEIGHT: 144 LBS | TEMPERATURE: 96.8 F

## 2020-11-16 DIAGNOSIS — F32.A DEPRESSION, UNSPECIFIED DEPRESSION TYPE: ICD-10-CM

## 2020-11-16 DIAGNOSIS — Z00.00 HEALTHCARE MAINTENANCE: Primary | ICD-10-CM

## 2020-11-16 DIAGNOSIS — D75.1 POLYCYTHEMIA: ICD-10-CM

## 2020-11-16 PROCEDURE — G0439 PPPS, SUBSEQ VISIT: HCPCS | Performed by: INTERNAL MEDICINE

## 2020-11-16 PROCEDURE — 99214 OFFICE O/P EST MOD 30 MIN: CPT | Performed by: INTERNAL MEDICINE

## 2020-11-16 NOTE — PROGRESS NOTES
"The ABCs of the Annual Wellness Visit  Subsequent Medicare Wellness Visit    Chief Complaint   Patient presents with   • Annual Exam     awv   cognitive concerns  Anxiety, depression        Subjective   History of Present Illness:  Fred Farr is a 65 y.o. female who presents for a Subsequent Medicare Wellness Visit. In general she is doing well today. She does express a concern of \"not being as sharp as I once was\". She had a mini mental through a home visit that was normal. She does note that she has been depressed. This is particularly related to the pandemic. Her mood does improve when she is able to leave the home. She has had several stressors with [place] and selling her personal home and purchasing a new home. She is a mental health therapist. She reports that she gets between 6-8.5 hours of sleep nightly. She has started melatonin w/ good benefit.       HEALTH RISK ASSESSMENT    Recent Hospitalizations:  No hospitalization(s) within the last year.    Current Medical Providers:  Patient Care Team:  Viri Caldera MD as PCP - General    Smoking Status:  Social History     Tobacco Use   Smoking Status Never Smoker   Smokeless Tobacco Never Used       Alcohol Consumption:  Social History     Substance and Sexual Activity   Alcohol Use Yes    Comment: SOCIAL       Depression Screen:   PHQ-2/PHQ-9 Depression Screening 11/16/2020   Little interest or pleasure in doing things 0   Feeling down, depressed, or hopeless 0   Total Score 0       Fall Risk Screen:  AZUCENAADI Fall Risk Assessment was completed, and patient is at HIGH risk for falls. Assessment completed on:11/16/2020    Health Habits and Functional and Cognitive Screening:  Functional & Cognitive Status 11/16/2020   Do you have difficulty preparing food and eating? No   Do you have difficulty bathing yourself, getting dressed or grooming yourself? No   Do you have difficulty using the toilet? No   Do you have difficulty moving around from place " to place? No   Do you have trouble with steps or getting out of a bed or a chair? No   Current Diet Well Balanced Diet   Dental Exam Up to date   Eye Exam Not up to date   Exercise (times per week) 0 times per week   Current Exercise Activities Include Walking   Do you need help using the phone?  No   Are you deaf or do you have serious difficulty hearing?  No   Do you need help with transportation? No   Do you need help shopping? No   Do you need help preparing meals?  No   Do you need help with housework?  No   Do you need help with laundry? No   Do you need help taking your medications? No   Do you need help managing money? No   Do you ever drive or ride in a car without wearing a seat belt? No   Have you felt unusual stress, anger or loneliness in the last month? No   Who do you live with? Spouse   If you need help, do you have trouble finding someone available to you? No   Have you been bothered in the last four weeks by sexual problems? No   Do you have difficulty concentrating, remembering or making decisions? No         Does the patient have evidence of cognitive impairment? No    Asprin use counseling:Taking ASA appropriately as indicated    Age-appropriate Screening Schedule:  Refer to the list below for future screening recommendations based on patient's age, sex and/or medical conditions. Orders for these recommended tests are listed in the plan section. The patient has been provided with a written plan.    Health Maintenance   Topic Date Due   • ZOSTER VACCINE (3 of 3) 02/10/2020   • MAMMOGRAM  11/18/2021   • TDAP/TD VACCINES (2 - Td) 10/20/2027   • COLONOSCOPY  03/05/2028   • INFLUENZA VACCINE  Completed          The following portions of the patient's history were reviewed and updated as appropriate: allergies, current medications, past family history, past medical history, past social history, past surgical history and problem list.    Outpatient Medications Prior to Visit   Medication Sig Dispense  Refill   • acyclovir (ZOVIRAX) 5 % ointment Apply to affected area tid prn 15 g 0   • albuterol sulfate  (90 Base) MCG/ACT inhaler Inhale 2 puffs Every 4 (Four) Hours As Needed for Wheezing. 1 inhaler 5   • aspirin 81 MG EC tablet Take 81 mg by mouth Daily.     • benzonatate (TESSALON PERLES) 100 MG capsule Take 1 capsule by mouth 3 (Three) Times a Day As Needed for Cough. 30 capsule 1   • buPROPion XL (WELLBUTRIN XL) 150 MG 24 hr tablet Take 1 tablet by mouth Every Morning. 90 tablet 2   • montelukast (SINGULAIR) 10 MG tablet Take 1 tablet by mouth Every Night. 90 tablet 3   • ofloxacin (Ocuflox) 0.3 % ophthalmic solution Administer 1 drop to the right eye 4 (Four) Times a Day. 5 mL 0   • predniSONE (DELTASONE) 10 MG (48) tablet pack Take as directed 21 tablet 0   • saccharomyces boulardii (FLORASTOR) 250 MG capsule Take 1 capsule by mouth 2 (Two) Times a Day. 20 capsule 0   • valACYclovir (VALTREX) 500 MG tablet TK 1 T PO QD PRN  6   • amoxicillin-clavulanate (AUGMENTIN) 875-125 MG per tablet Take 1 tablet by mouth 2 (Two) Times a Day. 14 tablet 0     No facility-administered medications prior to visit.        Patient Active Problem List   Diagnosis   • Allergic rhinitis   • Healthcare maintenance   • Hearing impairment   • Recurrent cold sores   • Encounter for screening for malignant neoplasm of colon       Advanced Care Planning:  ACP discussion was held with the patient during this visit. Patient has an advance directive (not in EMR), copy requested.    Review of Systems    Compared to one year ago, the patient feels her physical health is the same.  Compared to one year ago, the patient feels her mental health is worse.due to pandemic.     Reviewed chart for potential of high risk medication in the elderly: yes  Reviewed chart for potential of harmful drug interactions in the elderly:yes    Objective         Vitals:    11/16/20 1529 11/16/20 1654   BP: 92/61 110/62   BP Location: Right arm    Patient  "Position: Sitting    Cuff Size: Adult    Pulse: 72    Temp: 96.8 °F (36 °C)    TempSrc: Temporal    SpO2: 96%    Weight: 65.3 kg (144 lb)    Height: 167.6 cm (66\")    PainSc: 0-No pain        Body mass index is 23.24 kg/m².  Discussed the patient's BMI with her. The BMI is in the acceptable range.    Physical Exam  Vitals signs and nursing note reviewed.   Constitutional:       Appearance: Normal appearance.   HENT:      Head: Normocephalic and atraumatic.      Right Ear: Tympanic membrane normal.      Left Ear: Tympanic membrane normal.      Nose: Nose normal.      Mouth/Throat:      Mouth: Mucous membranes are moist.   Eyes:      Extraocular Movements: Extraocular movements intact.      Pupils: Pupils are equal, round, and reactive to light.   Neck:      Musculoskeletal: Normal range of motion.   Cardiovascular:      Rate and Rhythm: Normal rate and regular rhythm.      Pulses: Normal pulses.   Pulmonary:      Effort: Pulmonary effort is normal.      Breath sounds: Normal breath sounds.   Abdominal:      General: Abdomen is flat.      Palpations: Abdomen is soft.   Genitourinary:     General: Normal vulva.      Vagina: No vaginal discharge.      Rectum: Normal.   Musculoskeletal: Normal range of motion.   Skin:     General: Skin is warm and dry.   Neurological:      General: No focal deficit present.      Mental Status: She is alert and oriented to person, place, and time.   Psychiatric:         Mood and Affect: Mood normal.         Behavior: Behavior normal.         Thought Content: Thought content normal.         Judgment: Judgment normal.         Lab Results   Component Value Date    GLU 82 11/12/2020    CHLPL 200 11/12/2020    TRIG 59 11/12/2020    HDL 75 (H) 11/12/2020     (H) 11/12/2020    VLDL 11 11/12/2020        Assessment/Plan   Medicare Risks and Personalized Health Plan  CMS Preventative Services Quick Reference  Immunizations Discussed/Encouraged (specific immunizations; Hepatitis A " Vaccine/Series and Shingrix )    The above risks/problems have been discussed with the patient.  Pertinent information has been shared with the patient in the After Visit Summary.  Follow up plans and orders are seen below in the Assessment/Plan Section.    Diagnoses and all orders for this visit:    1. Healthcare maintenance (Primary)    2. Polycythemia  -     Ambulatory Referral to Hematology    3. Depression, unspecified depression type      Follow Up:  Return in about 1 year (around 11/16/2021) for Medicare Wellness.     An After Visit Summary and PPPS were given to the patient.     pateint will conitnue current meds for mood. At length discussion on self care w/ healthy nutrition, increased physical activity. To explore new learning opportunities. To see hematology to eval polycythemia. Advised pateint she likely could just donate blood at intervals. Do not think she needs daily ASA. F/u routinely.           Answers for HPI/ROS submitted by the patient on 11/14/2020   What is the primary reason for your visit?: Physical

## 2021-01-08 ENCOUNTER — CONSULT (OUTPATIENT)
Dept: ONCOLOGY | Facility: CLINIC | Age: 66
End: 2021-01-08

## 2021-01-08 ENCOUNTER — APPOINTMENT (OUTPATIENT)
Dept: OTHER | Facility: HOSPITAL | Age: 66
End: 2021-01-08

## 2021-01-08 VITALS
BODY MASS INDEX: 22.6 KG/M2 | TEMPERATURE: 98.2 F | OXYGEN SATURATION: 97 % | HEIGHT: 67 IN | WEIGHT: 144 LBS | HEART RATE: 86 BPM | SYSTOLIC BLOOD PRESSURE: 96 MMHG | DIASTOLIC BLOOD PRESSURE: 67 MMHG | RESPIRATION RATE: 16 BRPM

## 2021-01-08 DIAGNOSIS — R89.9 ABNORMAL LABORATORY TEST: ICD-10-CM

## 2021-01-08 DIAGNOSIS — R79.9 ABNORMAL FINDING OF BLOOD CHEMISTRY, UNSPECIFIED: ICD-10-CM

## 2021-01-08 DIAGNOSIS — D75.1 ERYTHROCYTOSIS: Primary | ICD-10-CM

## 2021-01-08 DIAGNOSIS — Z12.11 ENCOUNTER FOR SCREENING FOR MALIGNANT NEOPLASM OF COLON: Primary | ICD-10-CM

## 2021-01-08 LAB
BASOPHILS # BLD AUTO: 0.09 10*3/MM3 (ref 0–0.2)
BASOPHILS NFR BLD AUTO: 1.3 % (ref 0–1.5)
DEPRECATED RDW RBC AUTO: 48.4 FL (ref 37–54)
EOSINOPHIL # BLD AUTO: 0.09 10*3/MM3 (ref 0–0.4)
EOSINOPHIL NFR BLD AUTO: 1.3 % (ref 0.3–6.2)
ERYTHROCYTE [DISTWIDTH] IN BLOOD BY AUTOMATED COUNT: 15 % (ref 12.3–15.4)
FERRITIN SERPL-MCNC: 99.6 NG/ML (ref 13–150)
HCT VFR BLD AUTO: 48.3 % (ref 34–46.6)
HGB BLD-MCNC: 15.8 G/DL (ref 12–15.9)
HGB RETIC QN AUTO: 33.1 PG (ref 29.8–36.1)
IMM GRANULOCYTES # BLD AUTO: 0.17 10*3/MM3 (ref 0–0.05)
IMM GRANULOCYTES NFR BLD AUTO: 2.4 % (ref 0–0.5)
IMM RETICS NFR: 5.7 % (ref 3–15.8)
IRON 24H UR-MRATE: 108 MCG/DL (ref 37–145)
IRON SATN MFR SERPL: 31 % (ref 20–50)
LYMPHOCYTES # BLD AUTO: 2.91 10*3/MM3 (ref 0.7–3.1)
LYMPHOCYTES NFR BLD AUTO: 41.6 % (ref 19.6–45.3)
MCH RBC QN AUTO: 28.9 PG (ref 26.6–33)
MCHC RBC AUTO-ENTMCNC: 32.7 G/DL (ref 31.5–35.7)
MCV RBC AUTO: 88.5 FL (ref 79–97)
MONOCYTES # BLD AUTO: 0.54 10*3/MM3 (ref 0.1–0.9)
MONOCYTES NFR BLD AUTO: 7.7 % (ref 5–12)
NEUTROPHILS NFR BLD AUTO: 3.19 10*3/MM3 (ref 1.7–7)
NEUTROPHILS NFR BLD AUTO: 45.7 % (ref 42.7–76)
NRBC BLD AUTO-RTO: 0 /100 WBC (ref 0–0.2)
PLAT MORPH BLD: NORMAL
PLATELET # BLD AUTO: 327 10*3/MM3 (ref 140–450)
PMV BLD AUTO: 10.5 FL (ref 6–12)
RBC # BLD AUTO: 5.46 10*6/MM3 (ref 3.77–5.28)
RBC MORPH BLD: NORMAL
RETICS # AUTO: 0.07 10*6/MM3 (ref 0.02–0.13)
RETICS/RBC NFR AUTO: 1.22 % (ref 0.7–1.9)
TIBC SERPL-MCNC: 347 MCG/DL (ref 298–536)
TRANSFERRIN SERPL-MCNC: 233 MG/DL (ref 200–360)
WBC # BLD AUTO: 6.99 10*3/MM3 (ref 3.4–10.8)
WBC MORPH BLD: NORMAL

## 2021-01-08 PROCEDURE — 85025 COMPLETE CBC W/AUTO DIFF WBC: CPT | Performed by: INTERNAL MEDICINE

## 2021-01-08 PROCEDURE — 82668 ASSAY OF ERYTHROPOIETIN: CPT | Performed by: INTERNAL MEDICINE

## 2021-01-08 PROCEDURE — 85007 BL SMEAR W/DIFF WBC COUNT: CPT | Performed by: INTERNAL MEDICINE

## 2021-01-08 PROCEDURE — 83540 ASSAY OF IRON: CPT | Performed by: INTERNAL MEDICINE

## 2021-01-08 PROCEDURE — 84466 ASSAY OF TRANSFERRIN: CPT | Performed by: INTERNAL MEDICINE

## 2021-01-08 PROCEDURE — 85046 RETICYTE/HGB CONCENTRATE: CPT | Performed by: INTERNAL MEDICINE

## 2021-01-08 PROCEDURE — 82728 ASSAY OF FERRITIN: CPT | Performed by: INTERNAL MEDICINE

## 2021-01-08 PROCEDURE — 36415 COLL VENOUS BLD VENIPUNCTURE: CPT

## 2021-01-08 PROCEDURE — 99204 OFFICE O/P NEW MOD 45 MIN: CPT | Performed by: INTERNAL MEDICINE

## 2021-01-08 PROCEDURE — 82375 ASSAY CARBOXYHB QUANT: CPT | Performed by: INTERNAL MEDICINE

## 2021-01-08 NOTE — PROGRESS NOTES
"REFERRING PROVIDER:    Viri Caldera MD  [id number]  [facility] [address]  [place] [address]    REASON FOR CONSULTATION:    Erythrocytosis    HISTORY OF PRESENT ILLNESS:  Fred Farr is a 65 y.o. female who is referred today for further evaluation of erythrocytosis.    Hgb normal until 11/12/2020 with hgb 16.0, hct 49.8%.    WBC and PLT normal.     She states that she has a long history of being told that her blood was \"thick\" and she has been on aspirin for many years as a result of this.    She states that her Fitbit tells her that her oxygen levels dropped a little bit at night.  She carries no formal diagnosis of sleep apnea.  No congenital heart problems.  No significant time at high altitudes.  She does not smoke.  She denies any carbon monoxide exposure.    She generally feels well.  She notes some mild memory problems.    Past Medical History:   Diagnosis Date   • Anxiety    • Depression    • Low blood pressure    • Right bundle branch block    • Skin cancer        Past Surgical History:   Procedure Laterality Date   • COLONOSCOPY N/A 3/5/2018    Procedure: COLONOSCOPY TO CECUM AND TI;  Surgeon: Danette Shabazz MD;  Location: [facility] ENDOSCOPY;  Service:    • MOHS SURGERY     • OOPHORECTOMY      12 years ago   • RHINOPLASTY      X2       SOCIAL HISTORY:   reports that she has never smoked. She has never used smokeless tobacco. She reports current alcohol use. She reports that she does not use drugs.    FAMILY HISTORY:  family history includes Arthritis in her mother; Asthma in her sister; Cancer in her father; Heart disease in her mother; Hypothyroidism in her brother; Macular degeneration in her brother and father; Stroke in her paternal grandfather; Thyroid disease in her brother.    ALLERGIES:  No Known Allergies    MEDICATIONS:  The medication list has been reviewed with the patient by the medical assistant, and the list has been updated in the electronic medical record, which I reviewed.  Medication " "dosages and frequencies were confirmed to be accurate.    Review of Systems   Psychiatric/Behavioral: Positive for dysphoric mood.        Mild memory loss   All other systems reviewed and are negative.      Vitals:    01/08/21 1051   BP: 96/67   Pulse: 86   Resp: 16   Temp: 98.2 °F (36.8 °C)   TempSrc: Temporal   SpO2: 97%   Weight: 65.3 kg (144 lb)   Height: 170 cm (66.93\")   PainSc: 0-No pain  Comment: polycythemia       Physical Exam  Vitals signs reviewed.   Constitutional:       Appearance: She is well-developed.   HENT:      Head: Normocephalic and atraumatic.      Comments: Wearing a facemask  Eyes:      Conjunctiva/sclera: Conjunctivae normal.      Pupils: Pupils are equal, round, and reactive to light.   Neck:      Musculoskeletal: Neck supple.   Cardiovascular:      Rate and Rhythm: Normal rate and regular rhythm.      Heart sounds: Normal heart sounds, S1 normal and S2 normal. No murmur. No friction rub. No gallop.    Pulmonary:      Effort: Pulmonary effort is normal. No respiratory distress.      Breath sounds: Normal breath sounds. No stridor. No wheezing, rhonchi or rales.   Chest:      Chest wall: No tenderness.   Abdominal:      General: Bowel sounds are normal. There is no distension.      Palpations: Abdomen is soft. There is no hepatomegaly, splenomegaly or mass.      Tenderness: There is no abdominal tenderness. There is no guarding or rebound.   Musculoskeletal: Normal range of motion.   Lymphadenopathy:      Cervical: No cervical adenopathy.      Upper Body:      Right upper body: No supraclavicular adenopathy.      Left upper body: No supraclavicular adenopathy.   Skin:     General: Skin is warm and dry.      Findings: No erythema or rash.   Neurological:      Mental Status: She is alert and oriented to person, place, and time.      Cranial Nerves: No cranial nerve deficit.      Sensory: No sensory deficit.   Psychiatric:         Behavior: Behavior normal.         Thought Content: Thought " content normal.         Judgment: Judgment normal.         DIAGNOSTIC DATA:  CBC & Differential (01/08/2021 10:44)      IMAGING:    I personally reviewed the peripheral blood smear from today.  Red cells appear normal.  No obvious white cell normalities.  Platelets adequate in number and normal in morphology.    ASSESSMENT:  This is a 65 y.o. female with:    *Erythrocytosis  · Hgb normal until 11/12/2020 with hgb 16.0, hct 49.8%.  · Hct mildly elevated since 2016 at 47-48%  · She, however, states that this has been a mild and chronic issue  · She does take aspirin 81 mg daily  · She states that her Fitbit tells her that her oxygen levels dropped a little bit at night.  She carries no formal diagnosis of sleep apnea.  No congenital heart problems.  No significant time at high altitudes.  She does not smoke.  She denies any carbon monoxide exposure.      PLAN:   1. Laboratory evaluation as noted below.  If this evaluation is negative, consider an evaluation for sleep apnea and at that point we will see if a JAK2 mutation analysis would be approved by insurance.  2. I will notify her of any abnormal lab values that require immediate attention and otherwise plan to see her back in about 2 months for follow-up with a CBC.    Orders Placed This Encounter   Procedures   • Erythropoietin   • Carbon Monoxide, Blood   • Ferritin   • Iron Profile   • Retic With IRF & RET-He   • CBC & Differential     Standing Status:   Future     Standing Expiration Date:   1/8/2022     Order Specific Question:   Manual Differential     Answer:   No      Diagnosis Plan   1. Erythrocytosis  CBC & Differential    Erythropoietin    Carbon Monoxide, Blood    Ferritin    Iron Profile    Retic With IRF & RET-He   2. Abnormal finding of blood chemistry, unspecified   Ferritin    Iron Profile

## 2021-01-11 LAB
COHGB MFR BLD: 2 % (ref 0–3.6)
EPO SERPL-ACNC: 4.5 MIU/ML (ref 2.6–18.5)

## 2021-03-10 NOTE — PROGRESS NOTES
"[facility] CBC GROUP OUTPATIENT FOLLOW UP CLINIC VISIT    REASON FOR FOLLOW-UP:    Erythrocytosis    HISTORY OF PRESENT ILLNESS:  Fred Farr is a 66 y.o. female who returns today for follow up of the above issue.      She continues to feel well.  She does feel like her cognitive abilities have decreased a little bit.  She states that her Fitbit tells her that her oxygen levels dropped at night but she has no detailed information regarding this.  She otherwise feels well.    REVIEW OF SYSTEMS:  Mild cognitive decline.    PHYSICAL EXAMINATION:    Vitals:    03/12/21 1017   BP: 90/68   Pulse: 74   Resp: 16   Temp: 97.3 °F (36.3 °C)   TempSrc: Temporal   SpO2: 96%   Weight: 66.4 kg (146 lb 4.8 oz)   Height: 170 cm (66.93\")   PainSc: 0-No pain       General:  No acute distress, awake, alert and oriented  Skin:  Warm and dry, no visible rash  HEENT:  Normocephalic/atraumatic.  Wearing a facemask.  Chest:  Normal respiratory effort  Extremities:  No visible clubbing, cyanosis, or edema  Neuro/psych:  Grossly non-focal.  Normal mood and affect.      DIAGNOSTIC DATA:    Erythropoietin (01/08/2021 12:14)  Carbon Monoxide, Blood (01/08/2021 12:14)  Iron Profile (01/08/2021 12:14)  Ferritin (01/08/2021 12:14)  Retic With IRF & RET-He (01/08/2021 12:14)    CBC & Differential (03/12/2021 10:09)      IMAGING: None reviewed    ASSESSMENT:  This is a 66 y.o. female with:  *Erythrocytosis  · Hgb normal until 11/12/2020 with hgb 16.0, hct 49.8%.  · Hct mildly elevated since 2016 at 47-48%  · She, however, states that this has been a mild and chronic issue  · She does take aspirin 81 mg daily  · She states that her Fitbit tells her that her oxygen levels dropped a little bit at night.  She carries no formal diagnosis of sleep apnea.  No congenital heart problems.  No significant time at high altitudes.  She does not smoke.  She denies any carbon monoxide exposure.  · She was seen initially on 1/8/2021.  Hemoglobin 15.8 with " hematocrit 48.3%.  Laboratory evaluation pursued.  Erythropoietin normal at 4.5.  Carbon monoxide normal at 2.0.  Ferritin normal at 99.6 with normal iron studies.  · Hemoglobin today high normal at 15.7 with a hematocrit of 48.2%.     PLAN:  1. At this point due to the erythrocytosis and the fact that her Fitbit tells her that her oxygen levels drop at night I have referred her to to sleep medicine to consider a sleep study.  2. I will inquire as to whether JAK2 mutation analysis would be covered under her insurance.  3. She inquired as to whether she could donate blood and I do think this is a good idea for her.  4. I will see her in 6 months for follow-up with a CBC with further evaluation of appropriate at that time.

## 2021-03-12 ENCOUNTER — LAB (OUTPATIENT)
Dept: OTHER | Facility: HOSPITAL | Age: 66
End: 2021-03-12

## 2021-03-12 ENCOUNTER — OFFICE VISIT (OUTPATIENT)
Dept: ONCOLOGY | Facility: CLINIC | Age: 66
End: 2021-03-12

## 2021-03-12 VITALS
OXYGEN SATURATION: 96 % | HEIGHT: 67 IN | HEART RATE: 74 BPM | RESPIRATION RATE: 16 BRPM | TEMPERATURE: 97.3 F | DIASTOLIC BLOOD PRESSURE: 68 MMHG | WEIGHT: 146.3 LBS | BODY MASS INDEX: 22.96 KG/M2 | SYSTOLIC BLOOD PRESSURE: 90 MMHG

## 2021-03-12 DIAGNOSIS — D75.1 ERYTHROCYTOSIS: ICD-10-CM

## 2021-03-12 DIAGNOSIS — D75.1 ERYTHROCYTOSIS: Primary | ICD-10-CM

## 2021-03-12 LAB
BASOPHILS # BLD AUTO: 0.08 10*3/MM3 (ref 0–0.2)
BASOPHILS NFR BLD AUTO: 1.3 % (ref 0–1.5)
DEPRECATED RDW RBC AUTO: 50.1 FL (ref 37–54)
EOSINOPHIL # BLD AUTO: 0.11 10*3/MM3 (ref 0–0.4)
EOSINOPHIL NFR BLD AUTO: 1.7 % (ref 0.3–6.2)
ERYTHROCYTE [DISTWIDTH] IN BLOOD BY AUTOMATED COUNT: 15.4 % (ref 12.3–15.4)
HCT VFR BLD AUTO: 48.2 % (ref 34–46.6)
HGB BLD-MCNC: 15.7 G/DL (ref 12–15.9)
IMM GRANULOCYTES # BLD AUTO: 0.04 10*3/MM3 (ref 0–0.05)
IMM GRANULOCYTES NFR BLD AUTO: 0.6 % (ref 0–0.5)
LYMPHOCYTES # BLD AUTO: 2.1 10*3/MM3 (ref 0.7–3.1)
LYMPHOCYTES NFR BLD AUTO: 33.2 % (ref 19.6–45.3)
MCH RBC QN AUTO: 28.9 PG (ref 26.6–33)
MCHC RBC AUTO-ENTMCNC: 32.6 G/DL (ref 31.5–35.7)
MCV RBC AUTO: 88.8 FL (ref 79–97)
MONOCYTES # BLD AUTO: 0.6 10*3/MM3 (ref 0.1–0.9)
MONOCYTES NFR BLD AUTO: 9.5 % (ref 5–12)
NEUTROPHILS NFR BLD AUTO: 3.39 10*3/MM3 (ref 1.7–7)
NEUTROPHILS NFR BLD AUTO: 53.7 % (ref 42.7–76)
NRBC BLD AUTO-RTO: 0 /100 WBC (ref 0–0.2)
PLATELET # BLD AUTO: 341 10*3/MM3 (ref 140–450)
PMV BLD AUTO: 10.8 FL (ref 6–12)
RBC # BLD AUTO: 5.43 10*6/MM3 (ref 3.77–5.28)
WBC # BLD AUTO: 6.32 10*3/MM3 (ref 3.4–10.8)

## 2021-03-12 PROCEDURE — 36415 COLL VENOUS BLD VENIPUNCTURE: CPT

## 2021-03-12 PROCEDURE — 85025 COMPLETE CBC W/AUTO DIFF WBC: CPT | Performed by: INTERNAL MEDICINE

## 2021-03-12 PROCEDURE — 99213 OFFICE O/P EST LOW 20 MIN: CPT | Performed by: INTERNAL MEDICINE

## 2021-03-30 ENCOUNTER — TELEPHONE (OUTPATIENT)
Dept: INTERNAL MEDICINE | Facility: CLINIC | Age: 66
End: 2021-03-30

## 2021-03-30 NOTE — TELEPHONE ENCOUNTER
Caller: Poornima Farr    Relationship: Self    Best call back number: [phone]   What is the best time to reach you: ANYTIME    Who are you requesting to speak with (clinical staff, provider,  specific staff member):    Do you know the name of the person who called: POORNIMA    What was the call regarding: SCHEDULING HER  WITH DR VILLAFANA BECAUSE HE LOST HER PCP   Do you require a callback: YES

## 2021-04-29 ENCOUNTER — OFFICE VISIT (OUTPATIENT)
Dept: SLEEP MEDICINE | Facility: HOSPITAL | Age: 66
End: 2021-04-29

## 2021-04-29 VITALS — OXYGEN SATURATION: 99 % | BODY MASS INDEX: 22.91 KG/M2 | HEART RATE: 73 BPM | WEIGHT: 146 LBS | HEIGHT: 67 IN

## 2021-04-29 DIAGNOSIS — G47.30 SLEEP-DISORDERED BREATHING: Primary | ICD-10-CM

## 2021-04-29 PROCEDURE — 99204 OFFICE O/P NEW MOD 45 MIN: CPT | Performed by: INTERNAL MEDICINE

## 2021-04-29 PROCEDURE — G0463 HOSPITAL OUTPT CLINIC VISIT: HCPCS

## 2021-05-02 PROBLEM — G47.30 SLEEP-DISORDERED BREATHING: Status: ACTIVE | Noted: 2021-05-02

## 2021-05-24 ENCOUNTER — HOSPITAL ENCOUNTER (OUTPATIENT)
Dept: SLEEP MEDICINE | Facility: HOSPITAL | Age: 66
Discharge: HOME | End: 2021-05-24
Admitting: INTERNAL MEDICINE

## 2021-05-24 DIAGNOSIS — G47.30 SLEEP-DISORDERED BREATHING: ICD-10-CM

## 2021-05-24 PROCEDURE — 95806 SLEEP STUDY UNATT&RESP EFFT: CPT | Performed by: INTERNAL MEDICINE

## 2021-05-24 PROCEDURE — 95806 SLEEP STUDY UNATT&RESP EFFT: CPT

## 2021-06-30 ENCOUNTER — OFFICE VISIT (OUTPATIENT)
Dept: SLEEP MEDICINE | Facility: HOSPITAL | Age: 66
End: 2021-06-30

## 2021-06-30 VITALS — BODY MASS INDEX: 22.76 KG/M2 | HEIGHT: 67 IN | WEIGHT: 145 LBS

## 2021-06-30 DIAGNOSIS — G47.33 OSA (OBSTRUCTIVE SLEEP APNEA): Primary | ICD-10-CM

## 2021-06-30 PROBLEM — G47.30 SLEEP-DISORDERED BREATHING: Status: RESOLVED | Noted: 2021-05-02 | Resolved: 2021-06-30

## 2021-06-30 PROCEDURE — G0463 HOSPITAL OUTPT CLINIC VISIT: HCPCS

## 2021-06-30 PROCEDURE — 99213 OFFICE O/P EST LOW 20 MIN: CPT | Performed by: INTERNAL MEDICINE

## 2021-06-30 NOTE — PROGRESS NOTES
"Follow Up Sleep Disorders Center Note     Chief Complaint:  FERDINAND     Primary Care Physician: Viri Caldera MD    Interval History:   The patient is a 66 y.o. female  who I last saw 4/29/2021 and that note was reviewed.  A home sleep study was performed 5/24/2021.  Mild FERDINAND noted with AHI 8 events per hour.  No sleep-related hypoxia noted.  Patient is here for treatment recommendations.  The patient goes to bed at 11:30 PM and awakens at 8:30 AM.  She will use the restroom during the nighttime.    Self-administered [place] Sleepiness Scale test results: 6  0-5 Lower normal daytime sleepiness  6-10 Higher normal daytime sleepiness  11-12 Mild, 13-15 Moderate, & 16-24 Severe excessive daytime sleepiness    Review of Systems:    A complete review of systems was done and all were negative with the exception of postnasal drip and some anxiety    Social History:    Social History     Socioeconomic History   • Marital status:      Spouse name: Rodney   • Number of children: Not on file   • Years of education: Not on file   • Highest education level: Not on file   Tobacco Use   • Smoking status: Never Smoker   • Smokeless tobacco: Never Used   Substance and Sexual Activity   • Alcohol use: Yes     Comment: Almost none, 6 times per year   • Drug use: No   • Sexual activity: Defer       Allergies:  Patient has no known allergies.     Medication Review:  Reviewed.      Vital Signs:    Vitals:    06/30/21 1024   Weight: 65.8 kg (145 lb)   Height: 168.9 cm (66.5\")     Body mass index is 23.05 kg/m².    Physical Exam:    Constitutional:  Well developed 66 y.o. female that appears in no apparent distress.  Awake & oriented times 3.  Normal mood with normal recent and remote memory and normal judgement.  Eyes:  Conjunctivae normal.  Oropharynx: Previously, moist mucous membranes without exudate and a large tongue and normal uvula and patent posterior pharyngeal opening and class II-3 Mallampati airway, the patient does not " have dentures.  Patient is wearing a facemask.     I have reviewed the results of her home sleep study with her.      Impression:   Mild obstructive sleep apnea by home sleep study 5/24/2021.  The patient has no complaints of hypersomnolence.    Plan:  Good sleep hygiene measures should be maintained.        After evaluating the patient and assessing results available, the patient is benefiting from the treatment being provided.     As stated above, reviewed all with the patient.  Since the patient has mild severity, the patient may consider an oral appliance versus auto CPAP.  I reviewed risk and benefit of both.  After discussing with the patient, she wishes to proceed with an oral appliance evaluation which I concur with.  If the patient does obtain an oral appliance, repeat home sleep study with oral appliance in place should be performed.    I answered all of the patient's questions.  The patient will call for any problems and will follow up after obtaining her oral appliance for home sleep study.      Bruno Dhillon MD  Sleep Medicine  06/30/21  10:51 EDT

## 2021-07-19 ENCOUNTER — TELEMEDICINE (OUTPATIENT)
Dept: FAMILY MEDICINE CLINIC | Facility: TELEHEALTH | Age: 66
End: 2021-07-19

## 2021-07-19 DIAGNOSIS — R39.89 SUSPECTED UTI: Primary | ICD-10-CM

## 2021-07-19 PROCEDURE — 99213 OFFICE O/P EST LOW 20 MIN: CPT | Performed by: NURSE PRACTITIONER

## 2021-07-19 RX ORDER — AMOXICILLIN AND CLAVULANATE POTASSIUM 875; 125 MG/1; MG/1
1 TABLET, FILM COATED ORAL 2 TIMES DAILY
Qty: 14 TABLET | Refills: 0 | Status: SHIPPED | OUTPATIENT
Start: 2021-07-19 | End: 2021-07-26

## 2021-07-19 NOTE — PATIENT INSTRUCTIONS
Urinary Tract Infection, Adult    A urinary tract infection (UTI) is an infection of any part of the urinary tract. The urinary tract includes the kidneys, ureters, bladder, and urethra. These organs make, store, and get rid of urine in the body.  Your health care provider may use other names to describe the infection. An upper UTI affects the ureters and kidneys (pyelonephritis). A lower UTI affects the bladder (cystitis) and urethra (urethritis).  What are the causes?  Most urinary tract infections are caused by bacteria in your genital area, around the entrance to your urinary tract (urethra). These bacteria grow and cause inflammation of your urinary tract.  What increases the risk?  You are more likely to develop this condition if:  · You have a urinary catheter that stays in place (indwelling).  · You are not able to control when you urinate or have a bowel movement (you have incontinence).  · You are female and you:  ? Use a spermicide or diaphragm for birth control.  ? Have low estrogen levels.  ? Are pregnant.  · You have certain genes that increase your risk (genetics).  · You are sexually active.  · You take antibiotic medicines.  · You have a condition that causes your flow of urine to slow down, such as:  ? An enlarged prostate, if you are male.  ? Blockage in your urethra (stricture).  ? A kidney stone.  ? A nerve condition that affects your bladder control (neurogenic bladder).  ? Not getting enough to drink, or not urinating often.  · You have certain medical conditions, such as:  ? Diabetes.  ? A weak disease-fighting system (immunesystem).  ? Sickle cell disease.  ? Gout.  ? Spinal cord injury.  What are the signs or symptoms?  Symptoms of this condition include:  · Needing to urinate right away (urgently).  · Frequent urination or passing small amounts of urine frequently.  · Pain or burning with urination.  · Blood in the urine.  · Urine that smells bad or unusual.  · Trouble urinating.  · Cloudy  urine.  · Vaginal discharge, if you are female.  · Pain in the abdomen or the lower back.  You may also have:  · Vomiting or a decreased appetite.  · Confusion.  · Irritability or tiredness.  · A fever.  · Diarrhea.  The first symptom in older adults may be confusion. In some cases, they may not have any symptoms until the infection has worsened.  How is this diagnosed?  This condition is diagnosed based on your medical history and a physical exam. You may also have other tests, including:  · Urine tests.  · Blood tests.  · Tests for sexually transmitted infections (STIs).  If you have had more than one UTI, a cystoscopy or imaging studies may be done to determine the cause of the infections.  How is this treated?  Treatment for this condition includes:  · Antibiotic medicine.  · Over-the-counter medicines to treat discomfort.  · Drinking enough water to stay hydrated.  If you have frequent infections or have other conditions such as a kidney stone, you may need to see a health care provider who specializes in the urinary tract (urologist).  In rare cases, urinary tract infections can cause sepsis. Sepsis is a life-threatening condition that occurs when the body responds to an infection. Sepsis is treated in the hospital with IV antibiotics, fluids, and other medicines.  Follow these instructions at home:    Medicines  · Take over-the-counter and prescription medicines only as told by your health care provider.  · If you were prescribed an antibiotic medicine, take it as told by your health care provider. Do not stop using the antibiotic even if you start to feel better.  General instructions  · Make sure you:  ? Empty your bladder often and completely. Do not hold urine for long periods of time.  ? Empty your bladder after sex.  ? Wipe from front to back after a bowel movement if you are female. Use each tissue one time when you wipe.  · Drink enough fluid to keep your urine pale yellow.  · Keep all follow-up  visits as told by your health care provider. This is important.  Contact a health care provider if:  · Your symptoms do not get better after 1-2 days.  · Your symptoms go away and then return.  Get help right away if you have:  · Severe pain in your back or your lower abdomen.  · A fever.  · Nausea or vomiting.  Summary  · A urinary tract infection (UTI) is an infection of any part of the urinary tract, which includes the kidneys, ureters, bladder, and urethra.  · Most urinary tract infections are caused by bacteria in your genital area, around the entrance to your urinary tract (urethra).  · Treatment for this condition often includes antibiotic medicines.  · If you were prescribed an antibiotic medicine, take it as told by your health care provider. Do not stop using the antibiotic even if you start to feel better.  · Keep all follow-up visits as told by your health care provider. This is important.  This information is not intended to replace advice given to you by your health care provider. Make sure you discuss any questions you have with your health care provider.  Document Revised: 12/05/2019 Document Reviewed: 06/27/2019  [employer] Patient Education © 2021 [employer]

## 2021-07-19 NOTE — PROGRESS NOTES
CHIEF COMPLAINT  Chief Complaint   Patient presents with   • Urinary Tract Infection         HPI  Fred Farr is a 66 y.o. female  presents with complaint of 3 day history of urinary frequency, urgency, dysuria.  She denies fever/chills, nausea/vomiting, belly/back pain, hematuria, or vaginal symptoms.  She has had similar symptoms in the past    Review of Systems   Constitutional: Negative for activity change.   Genitourinary: Positive for dysuria, frequency and urgency. Negative for hematuria.   All other systems reviewed and are negative.      Past Medical History:   Diagnosis Date   • Anxiety    • Depression    • Low blood pressure    • FERDINAND (obstructive sleep apnea) 05/24/2021    Home sleep study.  Mild FERDINAND with AHI 8 events per hour.  No sleep-related hypoxia.  The patient snored 2.5% of total monitoring time.   • Right bundle branch block    • Skin cancer        Family History   Problem Relation Age of Onset   • Heart disease Mother    • Arthritis Mother    • Cancer Father         Colon cancer   • Macular degeneration Father    • Asthma Sister    • Hypothyroidism Brother    • Macular degeneration Brother    • Thyroid disease Brother    • Stroke Paternal Grandfather    • Malig Hyperthermia Neg Hx        Social History     Socioeconomic History   • Marital status:      Spouse name: Rodney   • Number of children: Not on file   • Years of education: Not on file   • Highest education level: Not on file   Tobacco Use   • Smoking status: Never Smoker   • Smokeless tobacco: Never Used   Substance and Sexual Activity   • Alcohol use: Yes     Comment: Almost none, 6 times per year   • Drug use: No   • Sexual activity: Defer         There were no vitals taken for this visit.    PHYSICAL EXAM  Physical Exam   Constitutional: She is oriented to person, place, and time. She appears well-developed and well-nourished.   HENT:   Head: Normocephalic and atraumatic.   Pulmonary/Chest: Effort normal.  No respiratory  distress.  Neurological: She is alert and oriented to person, place, and time.       Results for orders placed or performed in visit on 03/12/21   CBC Auto Differential    Specimen: Blood   Result Value Ref Range    WBC 6.32 3.40 - 10.80 10*3/mm3    RBC 5.43 (H) 3.77 - 5.28 10*6/mm3    Hemoglobin 15.7 12.0 - 15.9 g/dL    Hematocrit 48.2 (H) 34.0 - 46.6 %    MCV 88.8 79.0 - 97.0 fL    MCH 28.9 26.6 - 33.0 pg    MCHC 32.6 31.5 - 35.7 g/dL    RDW 15.4 12.3 - 15.4 %    RDW-SD 50.1 37.0 - 54.0 fl    MPV 10.8 6.0 - 12.0 fL    Platelets 341 140 - 450 10*3/mm3    Neutrophil % 53.7 42.7 - 76.0 %    Lymphocyte % 33.2 19.6 - 45.3 %    Monocyte % 9.5 5.0 - 12.0 %    Eosinophil % 1.7 0.3 - 6.2 %    Basophil % 1.3 0.0 - 1.5 %    Immature Grans % 0.6 (H) 0.0 - 0.5 %    Neutrophils, Absolute 3.39 1.70 - 7.00 10*3/mm3    Lymphocytes, Absolute 2.10 0.70 - 3.10 10*3/mm3    Monocytes, Absolute 0.60 0.10 - 0.90 10*3/mm3    Eosinophils, Absolute 0.11 0.00 - 0.40 10*3/mm3    Basophils, Absolute 0.08 0.00 - 0.20 10*3/mm3    Immature Grans, Absolute 0.04 0.00 - 0.05 10*3/mm3    nRBC 0.0 0.0 - 0.2 /100 WBC       Diagnoses and all orders for this visit:    Last urine culture showed growth of hemolytic streptococcus (treated with Augmentin)    1. Suspected UTI (Primary)  -     amoxicillin-clavulanate (AUGMENTIN) 875-125 MG per tablet; Take 1 tablet by mouth 2 (Two) Times a Day for 7 days.  Dispense: 14 tablet; Refill: 0    --Augmentin as prescribed - complete entire course of medication even if you begin to feel better.     -Continue to increase your fluid intake.   -Abstain from intercourse during antibiotic treatment.   -Practice good perineal hygiene: wipe front to back  -Do not hold your urine- go to the bathroom every 2-3 hours.     -Warning signs: severe abdominal/pelvic/back pain, fever >101, blood in urine - seek medical attention as soon as possible for a hands on/objective exam and possible labs.     -Follow up with your PCP in 2  days if no improvement in symptoms or if symptoms begin to worsen.         FOLLOW-UP  As discussed during visit with PCP/[facility] if no improvement or Urgent Care/Emergency Department if worsening of symptoms    Patient verbalizes understanding of medication dosage, comfort measures, instructions for treatment and follow-up.    JORDI Joiner  07/19/2021  12:43 EDT    This visit was performed via Telehealth.  This patient has been instructed to follow-up with their primary care provider if their symptoms worsen or the treatment provided does not resolve their illness.

## 2021-07-23 RX ORDER — MONTELUKAST SODIUM 10 MG/1
TABLET ORAL
Qty: 90 TABLET | Refills: 0 | Status: SHIPPED | OUTPATIENT
Start: 2021-07-23 | End: 2021-10-18

## 2021-08-11 ENCOUNTER — OFFICE VISIT (OUTPATIENT)
Dept: INTERNAL MEDICINE | Facility: CLINIC | Age: 66
End: 2021-08-11

## 2021-08-11 ENCOUNTER — TELEPHONE (OUTPATIENT)
Dept: INTERNAL MEDICINE | Facility: CLINIC | Age: 66
End: 2021-08-11

## 2021-08-11 VITALS
SYSTOLIC BLOOD PRESSURE: 128 MMHG | HEART RATE: 80 BPM | OXYGEN SATURATION: 98 % | WEIGHT: 144 LBS | TEMPERATURE: 97.1 F | RESPIRATION RATE: 18 BRPM | HEIGHT: 67 IN | BODY MASS INDEX: 22.6 KG/M2 | DIASTOLIC BLOOD PRESSURE: 70 MMHG

## 2021-08-11 DIAGNOSIS — R04.0 NOSEBLEED: Primary | ICD-10-CM

## 2021-08-11 LAB
HCT VFR BLDA CALC: 47.1 % (ref 38–51)
HGB BLDA-MCNC: 15.3 G/DL (ref 12–17)
MCH, POC: 29.6
MCHC, POC: 32.5
MCV, POC: 91.1
PLATELET # BLD: 324 10*3/MM3
PMV BLD: 10.2 FL
RBC, POC: 5.17
RDW, POC: 52.4
WBC # BLD: 5.7 10*3/UL

## 2021-08-11 PROCEDURE — 85025 COMPLETE CBC W/AUTO DIFF WBC: CPT | Performed by: NURSE PRACTITIONER

## 2021-08-11 PROCEDURE — 99213 OFFICE O/P EST LOW 20 MIN: CPT | Performed by: NURSE PRACTITIONER

## 2021-08-11 NOTE — PROGRESS NOTES
Subjective   Fred Farr is a 66 y.o. female.   Chief Complaint   Patient presents with   • Nose Bleed   Answers for HPI/ROS submitted by the patient on 8/11/2021  Please describe your symptoms.: 3 significant nose bleeds within a 36 hour period of time.  Have you had these symptoms before?: No  How long have you been having these symptoms?: 1-4 days  Please list any medications you are currently taking for this condition.: None. Have been taking 160 mg of aspirin daily. Lowered it to 80 mg last evening.  Please describe any probable cause for these symptoms. : Not sure.  What is the primary reason for your visit?: Other      Vitals:    08/11/21 1501   BP: 128/70   Pulse: 80   Resp: 18   Temp: 97.1 °F (36.2 °C)   SpO2: 98%     No LMP recorded. Patient is postmenopausal.    Fred is a 66 year old female patient of Dr Caldera who is here for an acute visit. She c/o nosebleeds for 36 hours.     Nose Bleed   The bleeding has been from the left nare. This is a new problem. The current episode started in the past 7 days. The problem occurs every few hours. The problem has been unchanged. The bleeding is associated with aspirin (recently increased aspirin to 2 81 mg tabs daily ). She has tried pressure for the symptoms. The treatment provided mild relief. There is no history of allergies, a bleeding disorder or frequent nosebleeds.      .cbc    The following portions of the patient's history were reviewed and updated as appropriate: allergies, current medications, past family history, past medical history, past social history, past surgical history and problem list.    Review of Systems   HENT: Positive for nosebleeds.    Respiratory: Negative.    Cardiovascular: Negative.        Objective   Physical Exam  Vitals reviewed.   HENT:      Head: Normocephalic.      Nose: Mucosal edema present.      Right Nostril: No epistaxis.      Left Nostril: No epistaxis.      Comments: Blood noted on tissue pt had   Cardiovascular:       Rate and Rhythm: Normal rate.   Pulmonary:      Effort: Pulmonary effort is normal.   Neurological:      Mental Status: She is alert.         Assessment/Plan   Diagnoses and all orders for this visit:    1. Nosebleed (Primary)  -     POC CBC With / Auto Diff      Cbc is normal  Hold asa for now  Recommend saline nasal spray and humidifier  Recommend consult with ENT if no improvement  Advised to go to the ER if nosebleed is persistent or severe

## 2021-08-11 NOTE — TELEPHONE ENCOUNTER
PATIENT CALLED C/O NOSE BLEEDS FOR THE LAST 2 DAYS. SEEMED TO STOPPED THE LAST 12 HOURS, BLEEDING  AGAIN NOW.    PLEASE ADVISE    [phone]

## 2021-09-17 ENCOUNTER — LAB (OUTPATIENT)
Dept: OTHER | Facility: HOSPITAL | Age: 66
End: 2021-09-17

## 2021-09-17 ENCOUNTER — OFFICE VISIT (OUTPATIENT)
Dept: ONCOLOGY | Facility: CLINIC | Age: 66
End: 2021-09-17

## 2021-09-17 VITALS
HEART RATE: 70 BPM | BODY MASS INDEX: 23.33 KG/M2 | OXYGEN SATURATION: 98 % | DIASTOLIC BLOOD PRESSURE: 64 MMHG | TEMPERATURE: 97.1 F | SYSTOLIC BLOOD PRESSURE: 98 MMHG | HEIGHT: 66 IN | RESPIRATION RATE: 16 BRPM | WEIGHT: 145.2 LBS

## 2021-09-17 DIAGNOSIS — D75.1 ERYTHROCYTOSIS: ICD-10-CM

## 2021-09-17 DIAGNOSIS — D75.1 ERYTHROCYTOSIS: Primary | ICD-10-CM

## 2021-09-17 LAB
BASOPHILS # BLD AUTO: 0.1 10*3/MM3 (ref 0–0.2)
BASOPHILS NFR BLD AUTO: 1.5 % (ref 0–1.5)
DEPRECATED RDW RBC AUTO: 48.5 FL (ref 37–54)
EOSINOPHIL # BLD AUTO: 0.12 10*3/MM3 (ref 0–0.4)
EOSINOPHIL NFR BLD AUTO: 1.8 % (ref 0.3–6.2)
ERYTHROCYTE [DISTWIDTH] IN BLOOD BY AUTOMATED COUNT: 15 % (ref 12.3–15.4)
HCT VFR BLD AUTO: 49.4 % (ref 34–46.6)
HGB BLD-MCNC: 16.2 G/DL (ref 12–15.9)
IMM GRANULOCYTES # BLD AUTO: 0.02 10*3/MM3 (ref 0–0.05)
IMM GRANULOCYTES NFR BLD AUTO: 0.3 % (ref 0–0.5)
LYMPHOCYTES # BLD AUTO: 1.99 10*3/MM3 (ref 0.7–3.1)
LYMPHOCYTES NFR BLD AUTO: 30.4 % (ref 19.6–45.3)
MCH RBC QN AUTO: 29 PG (ref 26.6–33)
MCHC RBC AUTO-ENTMCNC: 32.8 G/DL (ref 31.5–35.7)
MCV RBC AUTO: 88.4 FL (ref 79–97)
MONOCYTES # BLD AUTO: 0.59 10*3/MM3 (ref 0.1–0.9)
MONOCYTES NFR BLD AUTO: 9 % (ref 5–12)
NEUTROPHILS NFR BLD AUTO: 3.73 10*3/MM3 (ref 1.7–7)
NEUTROPHILS NFR BLD AUTO: 57 % (ref 42.7–76)
NRBC BLD AUTO-RTO: 0 /100 WBC (ref 0–0.2)
PLAT MORPH BLD: NORMAL
PLATELET # BLD AUTO: 279 10*3/MM3 (ref 140–450)
PMV BLD AUTO: 10.8 FL (ref 6–12)
RBC # BLD AUTO: 5.59 10*6/MM3 (ref 3.77–5.28)
RBC MORPH BLD: NORMAL
WBC # BLD AUTO: 6.55 10*3/MM3 (ref 3.4–10.8)
WBC MORPH BLD: NORMAL

## 2021-09-17 PROCEDURE — 36415 COLL VENOUS BLD VENIPUNCTURE: CPT

## 2021-09-17 PROCEDURE — 85007 BL SMEAR W/DIFF WBC COUNT: CPT | Performed by: INTERNAL MEDICINE

## 2021-09-17 PROCEDURE — 99213 OFFICE O/P EST LOW 20 MIN: CPT | Performed by: INTERNAL MEDICINE

## 2021-09-17 PROCEDURE — 85025 COMPLETE CBC W/AUTO DIFF WBC: CPT | Performed by: INTERNAL MEDICINE

## 2021-09-17 NOTE — PROGRESS NOTES
"[facility] GROUP OUTPATIENT FOLLOW UP CLINIC VISIT    REASON FOR FOLLOW-UP:    Erythrocytosis    HISTORY OF PRESENT ILLNESS:  Fred Farr is a 66 y.o. female who returns today for follow up of the above issue.      She saw Dr. Dhillon with sleep medicine and was diagnosed with mild sleep apnea.  She has not started to use a dental appliance but this is what she has decided to do for this.  She states that her Fitbit has not documented as much variation in her oxygen levels at this point.    She had a CBC on 8/11/2021 with hemoglobin of 15.3 and hematocrit 47.1% which were normal.    REVIEW OF SYSTEMS:  As per the HPI    PHYSICAL EXAMINATION:    Vitals:    09/17/21 0930   BP: 98/64   Pulse: 70   Resp: 16   Temp: 97.1 °F (36.2 °C)   TempSrc: Temporal   SpO2: 98%   Weight: 65.9 kg (145 lb 3.2 oz)   Height: 168.9 cm (66.5\")   PainSc: 0-No pain  Comment: erythocytosis     General:  No acute distress, awake, alert and oriented  Skin:  Warm and dry, no visible rash  HEENT:  Normocephalic/atraumatic.  Wearing a facemask.  Chest:  Normal respiratory effort  Extremities:  No visible clubbing, cyanosis, or edema  Neuro/psych:  Grossly non-focal.  Normal mood and affect.          DIAGNOSTIC DATA:  CBC & Differential (09/17/2021 09:28)      IMAGING: None reviewed    ASSESSMENT:  This is a 66 y.o. female with:    *Erythrocytosis  · Hgb normal until 11/12/2020 with hgb 16.0, hct 49.8%.  · Hct mildly elevated since 2016 at 47-48%  · She, however, states that this has been a mild and chronic issue  · She does take aspirin 81 mg daily  · She states that her Fitbit tells her that her oxygen levels dropped a little bit at night.  She carries no formal diagnosis of sleep apnea.  No congenital heart problems.  No significant time at high altitudes.  She does not smoke.  She denies any carbon monoxide exposure.  · She was seen initially on 1/8/2021.  Hemoglobin 15.8 with hematocrit 48.3%.  Laboratory evaluation pursued.  " Erythropoietin normal at 4.5.  Carbon monoxide normal at 2.0.  Ferritin normal at 99.6 with normal iron studies.  · She was referred to sleep medicine because of the erythrocytosis and because her Fitbit was telling her that her oxygen level dropped at night.  She saw Dr. Dhillon.  She was found to have mild obstructive sleep apnea by home sleep study 5/24/2021.  She is planning to get an oral appliance.     PLAN:  1. Inquire as to whether JAK2 testing would be covered by insurance  2. I encouraged her to go donate blood  3. I encouraged appropriate water consumption  4. She plans to get a dental appliance for her mild sleep apnea  5. Follow-up in 6 months with a CBC    Addendum: JAK2 testing has been approved should we elect to pursue this

## 2021-09-20 RX ORDER — BUPROPION HYDROCHLORIDE 150 MG/1
TABLET ORAL
Qty: 90 TABLET | Refills: 3 | Status: SHIPPED | OUTPATIENT
Start: 2021-09-20 | End: 2022-08-26

## 2021-09-28 ENCOUNTER — TELEMEDICINE (OUTPATIENT)
Dept: FAMILY MEDICINE CLINIC | Facility: TELEHEALTH | Age: 66
End: 2021-09-28

## 2021-09-28 DIAGNOSIS — R39.89 SUSPECTED UTI: Primary | ICD-10-CM

## 2021-09-28 PROCEDURE — 99213 OFFICE O/P EST LOW 20 MIN: CPT | Performed by: NURSE PRACTITIONER

## 2021-09-28 RX ORDER — PHENAZOPYRIDINE HYDROCHLORIDE 200 MG/1
200 TABLET, FILM COATED ORAL 3 TIMES DAILY PRN
Qty: 6 TABLET | Refills: 0 | Status: SHIPPED | OUTPATIENT
Start: 2021-09-28 | End: 2021-09-30

## 2021-09-28 RX ORDER — NITROFURANTOIN 25; 75 MG/1; MG/1
100 CAPSULE ORAL 2 TIMES DAILY
Qty: 14 CAPSULE | Refills: 0 | Status: SHIPPED | OUTPATIENT
Start: 2021-09-28 | End: 2021-10-05

## 2021-09-28 NOTE — PATIENT INSTRUCTIONS
Urinary Tract Infection, Adult    A urinary tract infection (UTI) is an infection of any part of the urinary tract. The urinary tract includes the kidneys, ureters, bladder, and urethra. These organs make, store, and get rid of urine in the body.  Your health care provider may use other names to describe the infection. An upper UTI affects the ureters and kidneys (pyelonephritis). A lower UTI affects the bladder (cystitis) and urethra (urethritis).  What are the causes?  Most urinary tract infections are caused by bacteria in your genital area, around the entrance to your urinary tract (urethra). These bacteria grow and cause inflammation of your urinary tract.  What increases the risk?  You are more likely to develop this condition if:  · You have a urinary catheter that stays in place (indwelling).  · You are not able to control when you urinate or have a bowel movement (you have incontinence).  · You are female and you:  ? Use a spermicide or diaphragm for birth control.  ? Have low estrogen levels.  ? Are pregnant.  · You have certain genes that increase your risk (genetics).  · You are sexually active.  · You take antibiotic medicines.  · You have a condition that causes your flow of urine to slow down, such as:  ? An enlarged prostate, if you are male.  ? Blockage in your urethra (stricture).  ? A kidney stone.  ? A nerve condition that affects your bladder control (neurogenic bladder).  ? Not getting enough to drink, or not urinating often.  · You have certain medical conditions, such as:  ? Diabetes.  ? A weak disease-fighting system (immunesystem).  ? Sickle cell disease.  ? Gout.  ? Spinal cord injury.  What are the signs or symptoms?  Symptoms of this condition include:  · Needing to urinate right away (urgently).  · Frequent urination or passing small amounts of urine frequently.  · Pain or burning with urination.  · Blood in the urine.  · Urine that smells bad or unusual.  · Trouble urinating.  · Cloudy  urine.  · Vaginal discharge, if you are female.  · Pain in the abdomen or the lower back.  You may also have:  · Vomiting or a decreased appetite.  · Confusion.  · Irritability or tiredness.  · A fever.  · Diarrhea.  The first symptom in older adults may be confusion. In some cases, they may not have any symptoms until the infection has worsened.  How is this diagnosed?  This condition is diagnosed based on your medical history and a physical exam. You may also have other tests, including:  · Urine tests.  · Blood tests.  · Tests for sexually transmitted infections (STIs).  If you have had more than one UTI, a cystoscopy or imaging studies may be done to determine the cause of the infections.  How is this treated?  Treatment for this condition includes:  · Antibiotic medicine.  · Over-the-counter medicines to treat discomfort.  · Drinking enough water to stay hydrated.  If you have frequent infections or have other conditions such as a kidney stone, you may need to see a health care provider who specializes in the urinary tract (urologist).  In rare cases, urinary tract infections can cause sepsis. Sepsis is a life-threatening condition that occurs when the body responds to an infection. Sepsis is treated in the hospital with IV antibiotics, fluids, and other medicines.  Follow these instructions at home:    Medicines  · Take over-the-counter and prescription medicines only as told by your health care provider.  · If you were prescribed an antibiotic medicine, take it as told by your health care provider. Do not stop using the antibiotic even if you start to feel better.  General instructions  · Make sure you:  ? Empty your bladder often and completely. Do not hold urine for long periods of time.  ? Empty your bladder after sex.  ? Wipe from front to back after a bowel movement if you are female. Use each tissue one time when you wipe.  · Drink enough fluid to keep your urine pale yellow.  · Keep all follow-up  visits as told by your health care provider. This is important.  Contact a health care provider if:  · Your symptoms do not get better after 1-2 days.  · Your symptoms go away and then return.  Get help right away if you have:  · Severe pain in your back or your lower abdomen.  · A fever.  · Nausea or vomiting.  Summary  · A urinary tract infection (UTI) is an infection of any part of the urinary tract, which includes the kidneys, ureters, bladder, and urethra.  · Most urinary tract infections are caused by bacteria in your genital area, around the entrance to your urinary tract (urethra).  · Treatment for this condition often includes antibiotic medicines.  · If you were prescribed an antibiotic medicine, take it as told by your health care provider. Do not stop using the antibiotic even if you start to feel better.  · Keep all follow-up visits as told by your health care provider. This is important.  This information is not intended to replace advice given to you by your health care provider. Make sure you discuss any questions you have with your health care provider.  Document Revised: 12/05/2019 Document Reviewed: 06/27/2019  [employer] Patient Education © 2021 [employer]

## 2021-09-28 NOTE — PROGRESS NOTES
CHIEF COMPLAINT  Chief Complaint   Patient presents with   • Urinary Tract Infection         HPI  Fred Farr is a 66 y.o. female  presents with complaint of 2 day history of burning, frequency, urgency, suprapubic pressure.  She denies fever/chills, nausea/vomiting, back/belly pain, hematuria, or vaginal symptoms.  She has had similar symptoms in the past which resolved with antibiotic treatment for UTI    Review of Systems   Genitourinary: Positive for dysuria, frequency and urgency. Negative for flank pain, hematuria and vaginal discharge.   All other systems reviewed and are negative.      Past Medical History:   Diagnosis Date   • Anxiety    • Depression    • Low blood pressure    • FERDINAND (obstructive sleep apnea) 05/24/2021    Home sleep study.  Mild FERDINAND with AHI 8 events per hour.  No sleep-related hypoxia.  The patient snored 2.5% of total monitoring time.   • Right bundle branch block    • Skin cancer        Family History   Problem Relation Age of Onset   • Heart disease Mother    • Arthritis Mother    • Cancer Father         Colon cancer   • Macular degeneration Father    • Asthma Sister    • Hypothyroidism Brother    • Macular degeneration Brother    • Thyroid disease Brother    • Stroke Paternal Grandfather    • Malig Hyperthermia Neg Hx        Social History     Socioeconomic History   • Marital status:      Spouse name: Rodney   • Number of children: Not on file   • Years of education: Not on file   • Highest education level: Not on file   Tobacco Use   • Smoking status: Never Smoker   • Smokeless tobacco: Never Used   Substance and Sexual Activity   • Alcohol use: Yes     Comment: Almost none, 6 times per year   • Drug use: No   • Sexual activity: Defer         There were no vitals taken for this visit.    PHYSICAL EXAM  Physical Exam   Constitutional: She is oriented to person, place, and time. She appears well-developed and well-nourished. She does not have a sickly appearance. She does not  appear ill.   HENT:   Head: Normocephalic and atraumatic.   Pulmonary/Chest: Effort normal.  No respiratory distress.  Neurological: She is alert and oriented to person, place, and time.         Diagnoses and all orders for this visit:    1. Suspected UTI (Primary)  -     nitrofurantoin, macrocrystal-monohydrate, (MACROBID) 100 MG capsule; Take 1 capsule by mouth 2 (Two) Times a Day for 7 days.  Dispense: 14 capsule; Refill: 0  -     phenazopyridine (PYRIDIUM) 200 MG tablet; Take 1 tablet by mouth 3 (Three) Times a Day As Needed for Bladder Spasms for up to 2 days.  Dispense: 6 tablet; Refill: 0    -Macrobid as prescribed - complete entire course of medication even if you begin to feel better.   --Phenazopyridine is for painful urination and bladder spasms--this medication with cause urine to become bright orange and can stain undergarments.    -Continue to increase your fluid intake.   -Abstain from intercourse during antibiotic treatment.   -Practice good perineal hygiene: wipe front to back  -Do not hold your urine- go to the bathroom every 2-3 hours.     -Warning signs: severe abdominal/pelvic/back pain, fever >101, blood in urine - seek medical attention as soon as possible for a hands on/objective exam and possible labs.     -Follow up with your PCP in 2 days if no improvement in symptoms or if symptoms begin to worsen.         FOLLOW-UP  As discussed during visit with PCP/[facility] if no improvement or Urgent Care/Emergency Department if worsening of symptoms    Patient verbalizes understanding of medication dosage, comfort measures, instructions for treatment and follow-up.    JORDI Joiner  09/28/2021  08:35 EDT    This visit was performed via Telehealth.  This patient has been instructed to follow-up with their primary care provider if their symptoms worsen or the treatment provided does not resolve their illness.

## 2021-10-18 RX ORDER — MONTELUKAST SODIUM 10 MG/1
TABLET ORAL
Qty: 90 TABLET | Refills: 3 | Status: SHIPPED | OUTPATIENT
Start: 2021-10-18

## 2021-11-04 DIAGNOSIS — Z13.220 LIPID SCREENING: ICD-10-CM

## 2021-11-04 DIAGNOSIS — Z13.29 THYROID DISORDER SCREENING: Primary | ICD-10-CM

## 2021-11-11 ENCOUNTER — TELEPHONE (OUTPATIENT)
Dept: INTERNAL MEDICINE | Facility: CLINIC | Age: 66
End: 2021-11-11

## 2021-11-11 NOTE — TELEPHONE ENCOUNTER
Caller: Fred Farr    Relationship: Self    Best call back number: [phone]     What is the best time to reach you: ANY TIME    Who are you requesting to speak with (clinical staff, provider,  specific staff member): CLINICAL    What was the call regarding: PATIENT HAS A LAB APPOINTMENT TOMORROW AND A PHYSICAL FOR NEXT WEEK BUT HAS COME IN CONTACT WITH SOMEONE THAT HAS COVID. SHE STATED SHE WAS 5 FEET APART FROM THEM BUT WANTED TO DOUBLE CHECK IF SHE'S STILL ALLOWED TO COME IN FOR HER APPOINTMENTS. SHE IS NOT HAVING ANY SYMPTOMS.   PATIENT WOULD LIKE TO SPEAK WITH ANY CLINICAL STAFF TO DISCUSS THIS.

## 2021-11-11 NOTE — TELEPHONE ENCOUNTER
Called and spoke with patient.  She dropped off soup to her daughter in law, outside and was not there for longer than 10 minutes and she stayed 5 feet apart. This was 4 days ago.   She is not having any symptoms.   I advised ok to come in tomorrow. If she develops any symptoms she is to call us.

## 2021-11-13 LAB
ALBUMIN SERPL-MCNC: 4.4 G/DL (ref 3.8–4.8)
ALBUMIN/GLOB SERPL: 1.8 {RATIO} (ref 1.2–2.2)
ALP SERPL-CCNC: 90 IU/L (ref 44–121)
ALT SERPL-CCNC: 22 IU/L (ref 0–32)
APPEARANCE UR: CLEAR
AST SERPL-CCNC: 25 IU/L (ref 0–40)
BACTERIA #/AREA URNS HPF: NORMAL /[HPF]
BASOPHILS # BLD AUTO: 0.1 X10E3/UL (ref 0–0.2)
BASOPHILS NFR BLD AUTO: 1 %
BILIRUB SERPL-MCNC: 0.3 MG/DL (ref 0–1.2)
BILIRUB UR QL STRIP: NEGATIVE
BUN SERPL-MCNC: 16 MG/DL (ref 8–27)
BUN/CREAT SERPL: 16 (ref 12–28)
CALCIUM SERPL-MCNC: 9.6 MG/DL (ref 8.7–10.3)
CASTS URNS QL MICRO: NORMAL /LPF
CHLORIDE SERPL-SCNC: 104 MMOL/L (ref 96–106)
CHOLEST SERPL-MCNC: 192 MG/DL (ref 100–199)
CO2 SERPL-SCNC: 26 MMOL/L (ref 20–29)
COLOR UR: YELLOW
CREAT SERPL-MCNC: 1.02 MG/DL (ref 0.57–1)
EOSINOPHIL # BLD AUTO: 0.1 X10E3/UL (ref 0–0.4)
EOSINOPHIL NFR BLD AUTO: 2 %
EPI CELLS #/AREA URNS HPF: NORMAL /HPF (ref 0–10)
ERYTHROCYTE [DISTWIDTH] IN BLOOD BY AUTOMATED COUNT: 13.5 % (ref 11.7–15.4)
GLOBULIN SER CALC-MCNC: 2.5 G/DL (ref 1.5–4.5)
GLUCOSE SERPL-MCNC: 89 MG/DL (ref 65–99)
GLUCOSE UR QL: NEGATIVE
HCT VFR BLD AUTO: 50.8 % (ref 34–46.6)
HDLC SERPL-MCNC: 63 MG/DL
HGB BLD-MCNC: 16 G/DL (ref 11.1–15.9)
HGB UR QL STRIP: NEGATIVE
IMM GRANULOCYTES # BLD AUTO: 0 X10E3/UL (ref 0–0.1)
IMM GRANULOCYTES NFR BLD AUTO: 0 %
KETONES UR QL STRIP: NEGATIVE
LDLC SERPL CALC-MCNC: 117 MG/DL (ref 0–99)
LEUKOCYTE ESTERASE UR QL STRIP: NEGATIVE
LYMPHOCYTES # BLD AUTO: 2.2 X10E3/UL (ref 0.7–3.1)
LYMPHOCYTES NFR BLD AUTO: 35 %
MCH RBC QN AUTO: 28.2 PG (ref 26.6–33)
MCHC RBC AUTO-ENTMCNC: 31.5 G/DL (ref 31.5–35.7)
MCV RBC AUTO: 90 FL (ref 79–97)
MICRO URNS: NORMAL
MICRO URNS: NORMAL
MONOCYTES # BLD AUTO: 0.5 X10E3/UL (ref 0.1–0.9)
MONOCYTES NFR BLD AUTO: 7 %
NEUTROPHILS # BLD AUTO: 3.5 X10E3/UL (ref 1.4–7)
NEUTROPHILS NFR BLD AUTO: 55 %
NITRITE UR QL STRIP: NEGATIVE
PH UR STRIP: 6 [PH] (ref 5–7.5)
PLATELET # BLD AUTO: 373 X10E3/UL (ref 150–450)
POTASSIUM SERPL-SCNC: 4.8 MMOL/L (ref 3.5–5.2)
PROT SERPL-MCNC: 6.9 G/DL (ref 6–8.5)
PROT UR QL STRIP: NEGATIVE
RBC # BLD AUTO: 5.67 X10E6/UL (ref 3.77–5.28)
RBC #/AREA URNS HPF: NORMAL /HPF (ref 0–2)
SODIUM SERPL-SCNC: 141 MMOL/L (ref 134–144)
SP GR UR: 1.02 (ref 1–1.03)
TRIGL SERPL-MCNC: 65 MG/DL (ref 0–149)
TSH SERPL DL<=0.005 MIU/L-ACNC: 2.48 UIU/ML (ref 0.45–4.5)
UROBILINOGEN UR STRIP-MCNC: 0.2 MG/DL (ref 0.2–1)
VLDLC SERPL CALC-MCNC: 12 MG/DL (ref 5–40)
WBC # BLD AUTO: 6.3 X10E3/UL (ref 3.4–10.8)
WBC #/AREA URNS HPF: NORMAL /HPF (ref 0–5)

## 2021-11-19 ENCOUNTER — OFFICE VISIT (OUTPATIENT)
Dept: INTERNAL MEDICINE | Facility: CLINIC | Age: 66
End: 2021-11-19

## 2021-11-19 VITALS
WEIGHT: 144 LBS | HEART RATE: 71 BPM | DIASTOLIC BLOOD PRESSURE: 54 MMHG | SYSTOLIC BLOOD PRESSURE: 96 MMHG | HEIGHT: 67 IN | BODY MASS INDEX: 22.6 KG/M2

## 2021-11-19 DIAGNOSIS — Z12.4 ENCOUNTER FOR SCREENING FOR CERVICAL CANCER: ICD-10-CM

## 2021-11-19 DIAGNOSIS — G47.33 OSA (OBSTRUCTIVE SLEEP APNEA): ICD-10-CM

## 2021-11-19 DIAGNOSIS — Z78.0 MENOPAUSE: ICD-10-CM

## 2021-11-19 DIAGNOSIS — Z00.00 HEALTHCARE MAINTENANCE: Primary | ICD-10-CM

## 2021-11-19 DIAGNOSIS — Z12.31 BREAST CANCER SCREENING BY MAMMOGRAM: ICD-10-CM

## 2021-11-19 DIAGNOSIS — D75.1 ERYTHROCYTOSIS: ICD-10-CM

## 2021-11-19 DIAGNOSIS — Z23 NEED FOR PNEUMOCOCCAL VACCINATION: ICD-10-CM

## 2021-11-19 PROCEDURE — 99213 OFFICE O/P EST LOW 20 MIN: CPT | Performed by: INTERNAL MEDICINE

## 2021-11-19 PROCEDURE — G0009 ADMIN PNEUMOCOCCAL VACCINE: HCPCS | Performed by: INTERNAL MEDICINE

## 2021-11-19 PROCEDURE — 90732 PPSV23 VACC 2 YRS+ SUBQ/IM: CPT | Performed by: INTERNAL MEDICINE

## 2021-11-19 PROCEDURE — 90662 IIV NO PRSV INCREASED AG IM: CPT | Performed by: INTERNAL MEDICINE

## 2021-11-19 PROCEDURE — 1159F MED LIST DOCD IN RCRD: CPT | Performed by: INTERNAL MEDICINE

## 2021-11-19 PROCEDURE — G0439 PPPS, SUBSEQ VISIT: HCPCS | Performed by: INTERNAL MEDICINE

## 2021-11-19 PROCEDURE — G0008 ADMIN INFLUENZA VIRUS VAC: HCPCS | Performed by: INTERNAL MEDICINE

## 2021-11-19 PROCEDURE — 1170F FXNL STATUS ASSESSED: CPT | Performed by: INTERNAL MEDICINE

## 2021-11-19 PROCEDURE — 93000 ELECTROCARDIOGRAM COMPLETE: CPT | Performed by: INTERNAL MEDICINE

## 2021-11-19 NOTE — PROGRESS NOTES
"The ABCs of the Annual Wellness Visit  Subsequent Medicare Wellness Visit    Chief Complaint   Patient presents with   • Annual Exam   • Sleep Apnea   • erythrocytosis   • Fatigue      Subjective    History of Present Illness:  Fred Farr is a 66 y.o. female who presents for a Subsequent Medicare Wellness Visit, to review chronic issues, and to address acute needs. Patient reports she has \"a laundry list of concerns\" today. Patient has recently felt like she has cognitive impairment. This is actually much better when she has a good night's rest. She was just diagnosed with FERDINAND and is being fit for a mandibular device. She has erythrocytosis. Sister recently diagnosed w/ a JAK2 kinase mutation. She is to be tested for this through hematology. She notes that she did try to donate blood but there was difficulty in obtaining this.   Reports increased urinary frequency. She was treated through telehealth (outside this office) on a couple occasions. She did not have urine tested at that time but did before this visit andit is wnl. . Cbc, cmp, tsh, lipid, u/a all reviewed in detail w/ patient today.       The following portions of the patient's history were reviewed and   updated as appropriate: allergies, current medications, past family history, past medical history, past social history, past surgical history and problem list.    Compared to one year ago, the patient feels her physical   health is worse.(slightly)    Compared to one year ago, the patient feels her mental   health is worse.(slightly)      Recent Hospitalizations:  She was not admitted to the hospital during the last year.       Current Medical Providers:  Patient Care Team:  Viri Caldera MD as PCP - General (Internal Medicine)  Lenard Pedraza MD as Consulting Physician (Hematology and Oncology)  Viri Caldera MD as Referring Physician (Internal Medicine)  Viri Caldera MD as Consulting Physician (Internal Medicine)    Outpatient Medications Prior " "to Visit   Medication Sig Dispense Refill   • acyclovir (ZOVIRAX) 5 % ointment Apply to affected area tid prn 15 g 0   • aspirin 81 MG EC tablet Take 81 mg by mouth Daily.     • buPROPion XL (WELLBUTRIN XL) 150 MG 24 hr tablet TAKE 1 TABLET BY MOUTH IN  THE MORNING 90 tablet 3   • montelukast (SINGULAIR) 10 MG tablet TAKE 1 TABLET BY MOUTH AT  NIGHT 90 tablet 3   • ofloxacin (Ocuflox) 0.3 % ophthalmic solution Administer 1 drop to the right eye 4 (Four) Times a Day. 5 mL 0   • albuterol sulfate  (90 Base) MCG/ACT inhaler Inhale 2 puffs Every 4 (Four) Hours As Needed for Wheezing. 1 inhaler 5   • benzonatate (TESSALON PERLES) 100 MG capsule Take 1 capsule by mouth 3 (Three) Times a Day As Needed for Cough. 30 capsule 1   • ciclopirox (PENLAC) 8 % solution APPLY QD TO THE AFFECTED TOENAILS AT BEDTIME OR 8 HOURS BEFORE WASHING       No facility-administered medications prior to visit.       No opioid medication identified on active medication list. I have reviewed chart for other potential  high risk medication/s and harmful drug interactions in the elderly.          Aspirin is on active medication list. Aspirin use is indicated based on review of current medical condition/s. Pros and cons of this therapy have been discussed today. Benefits of this medication outweigh potential harm.  Patient has been encouraged to continue taking this medication.  .      Patient Active Problem List   Diagnosis   • Allergic rhinitis   • Hearing impairment   • Recurrent cold sores   • Encounter for screening for malignant neoplasm of colon   • Erythrocytosis   • FERDINAND (obstructive sleep apnea)     Advance Care Planning  Advance Directive is not on file.  ACP discussion was held with the patient during this visit. Patient has an advance directive (not in EMR), copy requested.          Objective    Vitals:    11/19/21 1052   BP: 96/54   Pulse: 71   Weight: 65.3 kg (144 lb)   Height: 168.9 cm (66.5\")     BMI Readings from Last 1 " Encounters:   11/19/21 22.89 kg/m²   BMI is within normal parameters. No follow-up required.    Does the patient have evidence of cognitive impairment? No    Physical Exam  Vitals and nursing note reviewed.   Constitutional:       Appearance: Normal appearance. She is well-developed.   HENT:      Head: Normocephalic and atraumatic.      Right Ear: Hearing, tympanic membrane and external ear normal.      Left Ear: Hearing, tympanic membrane and external ear normal.      Nose: Nose normal.      Mouth/Throat:      Mouth: Mucous membranes are moist.   Eyes:      Extraocular Movements: Extraocular movements intact.      Pupils: Pupils are equal, round, and reactive to light.   Neck:      Thyroid: No thyromegaly.   Cardiovascular:      Rate and Rhythm: Normal rate and regular rhythm.      Pulses: Normal pulses.      Heart sounds: Normal heart sounds. No murmur heard.      Pulmonary:      Effort: Pulmonary effort is normal.      Breath sounds: Normal breath sounds.   Chest:   Breasts:      Right: No mass.      Left: No mass.       Abdominal:      General: Abdomen is flat. Bowel sounds are normal. There is no distension.      Palpations: Abdomen is soft.      Tenderness: There is no abdominal tenderness.   Musculoskeletal:      Cervical back: Neck supple.   Lymphadenopathy:      Cervical: No cervical adenopathy.   Skin:     General: Skin is warm and dry.   Neurological:      General: No focal deficit present.      Mental Status: She is alert and oriented to person, place, and time.   Psychiatric:         Mood and Affect: Mood normal.         Speech: Speech normal.         Behavior: Behavior normal.         Thought Content: Thought content normal.         Judgment: Judgment normal.     EKG for gui/ fatigue. Sinus. 70 bpm. RBBB. Unchanged from prior. Neg stress echo 2018.    Lab Results   Component Value Date    CHLPL 192 11/12/2021    TRIG 65 11/12/2021    HDL 63 11/12/2021     (H) 11/12/2021    VLDL 12 11/12/2021             HEALTH RISK ASSESSMENT    Smoking Status:  Social History     Tobacco Use   Smoking Status Never Smoker   Smokeless Tobacco Never Used     Alcohol Consumption:  Social History     Substance and Sexual Activity   Alcohol Use Yes    Comment: Almost none, 6 times per year     Fall Risk Screen:    DAX Fall Risk Assessment was completed, and patient is at LOW risk for falls.Assessment completed on:11/19/2021    Depression Screening:  PHQ-2/PHQ-9 Depression Screening 11/19/2021   Little interest or pleasure in doing things 0   Feeling down, depressed, or hopeless 0   Trouble falling or staying asleep, or sleeping too much -   Feeling tired or having little energy -   Poor appetite or overeating -   Feeling bad about yourself - or that you are a failure or have let yourself or your family down -   Trouble concentrating on things, such as reading the newspaper or watching television -   Moving or speaking so slowly that other people could have noticed. Or the opposite - being so fidgety or restless that you have been moving around a lot more than usual -   Thoughts that you would be better off dead, or of hurting yourself in some way -   Total Score 0   If you checked off any problems, how difficult have these problems made it for you to do your work, take care of things at home, or get along with other people? -       Health Habits and Functional and Cognitive Screening:  Functional & Cognitive Status 11/19/2021   Do you have difficulty preparing food and eating? No   Do you have difficulty bathing yourself, getting dressed or grooming yourself? No   Do you have difficulty using the toilet? No   Do you have difficulty moving around from place to place? No   Do you have trouble with steps or getting out of a bed or a chair? No   Current Diet Well Balanced Diet   Dental Exam Up to date   Eye Exam Up to date   Exercise (times per week) -   Current Exercise Activities Include -   Do you need help using the phone?   No   Are you deaf or do you have serious difficulty hearing?  No   Do you need help with transportation? No   Do you need help shopping? No   Do you need help preparing meals?  No   Do you need help with housework?  No   Do you need help with laundry? No   Do you need help taking your medications? No   Do you need help managing money? No   Do you ever drive or ride in a car without wearing a seat belt? No   Have you felt unusual stress, anger or loneliness in the last month? No   Who do you live with? Spouse   If you need help, do you have trouble finding someone available to you? No   Have you been bothered in the last four weeks by sexual problems? No   Do you have difficulty concentrating, remembering or making decisions? No       Age-appropriate Screening Schedule:  Refer to the list below for future screening recommendations based on patient's age, sex and/or medical conditions. Orders for these recommended tests are listed in the plan section. The patient has been provided with a written plan.    Health Maintenance   Topic Date Due   • DXA SCAN  Never done   • PAP SMEAR  11/02/2021   • MAMMOGRAM  11/18/2021   • TDAP/TD VACCINES (2 - Td or Tdap) 10/20/2027   • INFLUENZA VACCINE  Completed   • ZOSTER VACCINE  Completed              Assessment/Plan   CMS Preventative Services Quick Reference  Risk Factors Identified During Encounter  Cardiovascular Disease, bone screening, pneumovax,   The above risks/problems have been discussed with the patient.  Follow up actions/plans if indicated are seen below in the Assessment/Plan Section.  Pertinent information has been shared with the patient in the After Visit Summary.    Diagnoses and all orders for this visit:    1. Healthcare maintenance (Primary)    2. FERDINAND (obstructive sleep apnea)  -     ECG 12 Lead    3. Breast cancer screening by mammogram  -     Mammo screening digital tomosynthesis bilateral w CAD; Future    4. Erythrocytosis    Other orders  -     Fluzone  High-Dose 65+yrs ([id number])        Follow Up:   No follow-ups on file.     An After Visit Summary and PPPS were made available to the patient.    She had erythrocytosis. Suspect FERDINAND plays at least part of a role in this. Will monitor h/h once this is treated with a mandibular device. She will have Jak2 kinase testing as listed. She has occasional urinary frequency. Her urine is clear at this time and encouraged to get testing whenever she feels she has a UTI. She is to continue routine hcm.       I spent 60 minutes caring for Fred on this date of service. This time includes time spent by me in the following activities:preparing for the visit, reviewing tests, obtaining and/or reviewing a separately obtained history, performing a medically appropriate examination and/or evaluation , counseling and educating the patient/family/caregiver, ordering medications, tests, or procedures, documenting information in the medical record and independently interpreting results and communicating that information with the patient/family/caregiver

## 2021-11-22 ENCOUNTER — TELEPHONE (OUTPATIENT)
Dept: INTERNAL MEDICINE | Facility: CLINIC | Age: 66
End: 2021-11-22

## 2021-11-22 NOTE — TELEPHONE ENCOUNTER
Spoke with pt she is much better today.   Pt did have to have 2 injections in same arm.  Will separate next time to other areas  Pt informed

## 2021-11-22 NOTE — TELEPHONE ENCOUNTER
Caller: Fred Farr    Relationship: Self    Best call back number: [phone]    What is the best time to reach you: any    Who are you requesting to speak with (clinical staff, provider,  specific staff member): clinical    Do you know the name of the person who called: patient    What was the call regarding: patient in bed from Friday night through the next day.  Large red patches on upper arm below the injection sight but not at the injection sight.    Patient concerned that she had such a severe reaction to the high flu dose injection and the pneumonia shots.  Patients arm was sore and is doing better but not completely cleared up as of today.     Do you require a callback: no

## 2021-11-25 LAB
CYTOLOGIST CVX/VAG CYTO: NORMAL
CYTOLOGY CVX/VAG DOC CYTO: NORMAL
CYTOLOGY CVX/VAG DOC THIN PREP: NORMAL
DX ICD CODE: NORMAL
HIV 1 & 2 AB SER-IMP: NORMAL
HPV I/H RISK 4 DNA CVX QL PROBE+SIG AMP: NEGATIVE
OTHER STN SPEC: NORMAL
STAT OF ADQ CVX/VAG CYTO-IMP: NORMAL

## 2021-12-09 ENCOUNTER — TELEPHONE (OUTPATIENT)
Dept: INTERNAL MEDICINE | Facility: CLINIC | Age: 66
End: 2021-12-09

## 2021-12-09 RX ORDER — VALACYCLOVIR HYDROCHLORIDE 1 G/1
2000 TABLET, FILM COATED ORAL 2 TIMES DAILY
Qty: 4 TABLET | Refills: 5 | Status: SHIPPED | OUTPATIENT
Start: 2021-12-09 | End: 2022-09-16 | Stop reason: SDUPTHER

## 2022-01-24 ENCOUNTER — TELEMEDICINE (OUTPATIENT)
Dept: FAMILY MEDICINE CLINIC | Facility: TELEHEALTH | Age: 67
End: 2022-01-24

## 2022-01-24 DIAGNOSIS — H10.31 ACUTE BACTERIAL CONJUNCTIVITIS OF RIGHT EYE: Primary | ICD-10-CM

## 2022-01-24 PROCEDURE — 99212 OFFICE O/P EST SF 10 MIN: CPT | Performed by: NURSE PRACTITIONER

## 2022-01-24 RX ORDER — OFLOXACIN 3 MG/ML
1 SOLUTION/ DROPS OPHTHALMIC 4 TIMES DAILY
Qty: 5 ML | Refills: 0 | Status: SHIPPED | OUTPATIENT
Start: 2022-01-24 | End: 2022-01-31

## 2022-01-24 NOTE — PROGRESS NOTES
Subjective   Fred Farr is a 66 y.o. female.     Symptoms are in her right eye. Her eye is red, itching, with a yellow/green discharge. She first noticed this problem 3-4 days ago. She has blepharitis. Denies known pink exposure. Denies contact lenses, denies recent eye injury.       The following portions of the patient's history were reviewed and updated as appropriate: allergies, current medications, past family history, past medical history, past social history, past surgical history, and problem list.    Review of Systems   Constitutional: Negative for fever.   Eyes: Positive for pain, discharge, redness and itching. Negative for blurred vision and double vision.       Objective   Physical Exam  Constitutional:       General: She is not in acute distress.     Appearance: She is well-developed. She is not diaphoretic.   Eyes:      General:         Right eye: Discharge present.      Conjunctiva/sclera:      Right eye: Right conjunctiva is injected. Exudate present.   Pulmonary:      Effort: Pulmonary effort is normal.   Neurological:      Mental Status: She is alert and oriented to person, place, and time.   Psychiatric:         Behavior: Behavior normal.           Assessment/Plan   Diagnoses and all orders for this visit:    1. Acute bacterial conjunctivitis of right eye (Primary)  -     ofloxacin (Ocuflox) 0.3 % ophthalmic solution; Administer 1 drop to the right eye 4 (Four) Times a Day for 7 days.  Dispense: 5 mL; Refill: 0                 The use of a video visit has been reviewed with the patient and verbal informed consent has been obtained. Myself and Fred Farr participated in this visit. The patient is located in [place], [place]. I am located in [place], [place]. Mychart and Zoom were utilized. I spent 18 minutes in the patient's chart for this visit.

## 2022-01-24 NOTE — PATIENT INSTRUCTIONS
-Throw out eye make up used in the days before symptom onset. Do not use eye make up until symptoms resolve.  -May use warm compresses to eye  -Pink eye is contagious until you have been on antibiotic drops for 24 hours. Practice excellent hand hygiene and avoid touching the affected eye as much as possible. Do not touch the tip of the eye dropper to your eye.  -Follow up with eye doctor if symptoms worsen or do not improve in 3-5 days    Bacterial Conjunctivitis, Adult  Bacterial conjunctivitis is an infection of the clear membrane that covers the white part of your eye and the inner surface of your eyelid (conjunctiva). When the blood vessels in your conjunctiva become inflamed, your eye becomes red or pink, and it will probably feel itchy. Bacterial conjunctivitis spreads very easily from person to person (is contagious). It also spreads easily from one eye to the other eye.  What are the causes?  This condition is caused by bacteria. You may get the infection if you come into close contact with:  · A person who is infected with the bacteria.  · Items that are contaminated with the bacteria, such as a face towel, contact lens solution, or eye makeup.  What increases the risk?  You are more likely to develop this condition if you:  · Are exposed to other people who have the infection.  · Wear contact lenses.  · Have a sinus infection.  · Have had a recent eye injury or surgery.  · Have a weak body defense system (immune system).  · Have a medical condition that causes dry eyes.  What are the signs or symptoms?  Symptoms of this condition include:  · Thick, yellowish discharge from the eye. This may turn into a crust on the eyelid overnight and cause your eyelids to stick together.  · Tearing or watery eyes.  · Itchy eyes.  · Burning feeling in your eyes.  · Eye redness.  · Swollen eyelids.  · Blurred vision.  How is this diagnosed?  This condition is diagnosed based on your symptoms and medical history. Your  health care provider may also take a sample of discharge from your eye to find the cause of your infection. This is rarely done.  How is this treated?  This condition may be treated with:  · Antibiotic eye drops or ointment to clear the infection more quickly and prevent the spread of infection to others.  · Oral antibiotic medicines to treat infections that do not respond to drops or ointments or that last longer than 10 days.  · Cool, wet cloths (cool compresses) placed on the eyes.  · Artificial tears applied 2-6 times a day.  Follow these instructions at home:  Medicines  · Take or apply your antibiotic medicine as told by your health care provider. Do not stop taking or applying the antibiotic even if you start to feel better.  · Take or apply over-the-counter and prescription medicines only as told by your health care provider.  · Be very careful to avoid touching the edge of your eyelid with the eye-drop bottle or the ointment tube when you apply medicines to the affected eye. This will keep you from spreading the infection to your other eye or to other people.  Managing discomfort  · Gently wipe away any drainage from your eye with a warm, wet washcloth or a cotton ball.  · Apply a clean, cool compress to your eye for 10-20 minutes, 3-4 times a day.  General instructions  · Do not wear contact lenses until the inflammation is gone and your health care provider says it is safe to wear them again. Ask your health care provider how to sterilize or replace your contact lenses before you use them again. Wear glasses until you can resume wearing contact lenses.  · Avoid wearing eye makeup until the inflammation is gone. Throw away any old eye cosmetics that may be contaminated.  · Change or wash your pillowcase every day.  · Do not share towels or washcloths. This may spread the infection.  · Wash your hands often with soap and water. Use paper towels to dry your hands.  · Avoid touching or rubbing your  eyes.  · Do not drive or use heavy machinery if your vision is blurred.  Contact a health care provider if:  · You have a fever.  · Your symptoms do not get better after 10 days.  Get help right away if you have:  · A fever and your symptoms suddenly get worse.  · Severe pain when you move your eye.  · Facial pain, redness, or swelling.  · Sudden loss of vision.  Summary  · Bacterial conjunctivitis is an infection of the clear membrane that covers the white part of your eye and the inner surface of your eyelid (conjunctiva).  · Bacterial conjunctivitis spreads very easily from person to person (is contagious).  · Wash your hands often with soap and water. Use paper towels to dry your hands.  · Take or apply your antibiotic medicine as told by your health care provider. Do not stop taking or applying the antibiotic even if you start to feel better.  · Contact a health care provider if you have a fever or your symptoms do not get better after 10 days.  This information is not intended to replace advice given to you by your health care provider. Make sure you discuss any questions you have with your health care provider.  Document Revised: 04/07/2020 Document Reviewed: 07/24/2019  [employer] Patient Education © 2021 [employer]  Ofloxacin eye solution  What is this medicine?  OFLOXACIN (oh FLOKS a sin) is a quinolone antibiotic. It is used in the eye to treat bacterial infections.  This medicine may be used for other purposes; ask your health care provider or pharmacist if you have questions.  COMMON BRAND NAME(S): Ocuflox  What should I tell my health care provider before I take this medicine?  They need to know if you have any of these conditions:  · an unusual or allergic reaction to ofloxacin, fluoroquinolone antibiotics, other medicines, foods, dyes, or preservatives  · pregnant or trying to get pregnant  · breast-feeding  How should I use this medicine?  This medicine is only for use in the eye. Do not take by  mouth. Follow the directions on the prescription label. Wash hands before and after use. Tilt your head back slightly and pull your lower eyelid down with your index finger to form a pouch. Try not to touch the tip of the dropper to your eye, fingertips, or any other surface. Squeeze the prescribed number of drops into the pouch. Close the eye gently to spread the drops. Your vision may blur for a few minutes. Use your doses at regular intervals. Do not use your medicine more often than directed. Finish the full course prescribed by your doctor or health care professional even if you think your condition is better.  Talk to your pediatrician regarding the use of this medicine in children. Special care may be needed.  Overdosage: If you think you have taken too much of this medicine contact a poison control center or emergency room at once.  NOTE: This medicine is only for you. Do not share this medicine with others.  What if I miss a dose?  If you miss a dose, use it as soon as you can. If it is almost time for your next dose, use only that dose. Do not use double or extra doses.  What may interact with this medicine?  Interactions are not expected. Do not use any other eye products without telling your doctor or health care professional.  This list may not describe all possible interactions. Give your health care provider a list of all the medicines, herbs, non-prescription drugs, or dietary supplements you use. Also tell them if you smoke, drink alcohol, or use illegal drugs. Some items may interact with your medicine.  What should I watch for while using this medicine?  Tell your doctor or health care professional if your symptoms do not improve in 2 to 3 days.  If you get any sign of an allergic reaction, stop using your eye product and call your doctor or health care professional.  Wear sunglasses if this medicine makes your eyes more sensitive to light.  Do not wear contact lenses while you have any signs or  symptoms of an eye infection. Ask your doctor or health care professional when you can start wearing your lenses again.  What side effects may I notice from receiving this medicine?  Side effects that you should report to your doctor or health care professional as soon as possible:  · blurred vision that does not go away  · burning, stinging, or itching of the eyes or eyelids  · redness, swelling, or pain of the eyes or eyelids  Side effects that usually do not require medical attention (report to your doctor or health care professional if they continue or are bothersome):  · temporary blurred vision  · tearing or feeling of something in the eye  This list may not describe all possible side effects. Call your doctor for medical advice about side effects. You may report side effects to FDA at 9-719-NCG-3278.  Where should I keep my medicine?  Keep out of the reach of children.  Store at room temperature between 15 and 25 degrees C (59 and 77 degrees F). Throw away any unused eye solution after the expiration date.  NOTE: This sheet is a summary. It may not cover all possible information. If you have questions about this medicine, talk to your doctor, pharmacist, or health care provider.  © 2021 [employer] (2009-07-14 16:49:06)

## 2022-02-02 ENCOUNTER — APPOINTMENT (OUTPATIENT)
Dept: MAMMOGRAPHY | Facility: HOSPITAL | Age: 67
End: 2022-02-02

## 2022-02-07 ENCOUNTER — APPOINTMENT (OUTPATIENT)
Dept: BONE DENSITY | Facility: HOSPITAL | Age: 67
End: 2022-02-07

## 2022-02-08 ENCOUNTER — TELEPHONE (OUTPATIENT)
Dept: INTERNAL MEDICINE | Facility: CLINIC | Age: 67
End: 2022-02-08

## 2022-02-08 NOTE — TELEPHONE ENCOUNTER
----- Message from Fred Farr sent at 2/8/2022  3:10 PM EST -----  Regarding: Positive COVID test  Thanks so very much for the offer. The symptoms are pretty mild and tolerable, so I don’t think I need that. But thanks for offering it to me. I will get back in touch with you if I have a further need.   Thanks again!  Una

## 2022-02-17 ENCOUNTER — HOSPITAL ENCOUNTER (OUTPATIENT)
Dept: MAMMOGRAPHY | Facility: HOSPITAL | Age: 67
Discharge: HOME | End: 2022-02-17
Admitting: INTERNAL MEDICINE

## 2022-02-17 DIAGNOSIS — Z12.31 BREAST CANCER SCREENING BY MAMMOGRAM: ICD-10-CM

## 2022-02-17 PROCEDURE — 77063 BREAST TOMOSYNTHESIS BI: CPT

## 2022-02-17 PROCEDURE — 77067 SCR MAMMO BI INCL CAD: CPT

## 2022-03-17 NOTE — PROGRESS NOTES
"[facility] CBC GROUP OUTPATIENT FOLLOW UP CLINIC VISIT    REASON FOR FOLLOW-UP:    Erythrocytosis    HISTORY OF PRESENT ILLNESS:  Fred Farr is a 67 y.o. female who returns today for follow up of the above issue.      She is doing well.  She recently had her dental device for sleep apnea replaced because rolled unfractured.  Therefore she was off therapy for a while.  She has not been able to donate blood as they were not able to get a good blood return during that attempt.  Interestingly, her sister does have a JAK2 positive myeloproliferative disorder and it sounds like she has more of an issue with elevated platelets.      REVIEW OF SYSTEMS:  As per the HPI    PHYSICAL EXAMINATION:    Vitals:    03/18/22 0814   BP: 97/55   Pulse: 71   Resp: 16   Temp: 97.6 °F (36.4 °C)   TempSrc: Temporal   SpO2: 100%   Weight: 66.5 kg (146 lb 8 oz)   Height: 168.9 cm (66.5\")   PainSc: 0-No pain  Comment: erythocytosis     General:  No acute distress, awake, alert and oriented  Skin:  Warm and dry, no visible rash  HEENT:  Normocephalic/atraumatic.  Wearing a face mask.  Chest:  Normal respiratory effort  Extremities:  No visible clubbing, cyanosis, or edema  Neuro/psych:  Grossly nonfocal.  Normal mood and affect.        DIAGNOSTIC DATA:  CBC & Differential (03/18/2022 08:05)      IMAGING: None reviewed    ASSESSMENT:  This is a 67 y.o. female with:    *Erythrocytosis  · Hgb normal until 11/12/2020 with hgb 16.0, hct 49.8%.  · Hct mildly elevated since 2016 at 47-48%  · She, however, states that this has been a mild and chronic issue  · She does take aspirin 81 mg daily  · She states that her Fitbit tells her that her oxygen levels dropped a little bit at night.  She carries no formal diagnosis of sleep apnea.  No congenital heart problems.  No significant time at high altitudes.  She does not smoke.  She denies any carbon monoxide exposure.  · She was seen initially on 1/8/2021.  Hemoglobin 15.8 with hematocrit 48.3%.  " Laboratory evaluation pursued.  Erythropoietin normal at 4.5.  Carbon monoxide normal at 2.0.  Ferritin normal at 99.6 with normal iron studies.  · She was referred to sleep medicine because of the erythrocytosis and because her Fitbit was telling her that her oxygen level dropped at night.  She saw Dr. Dhillon.  She was found to have mild obstructive sleep apnea by home sleep study 5/24/2021.  She has an appliance now which she is using.  This was recently replaced as her old one fractured.     PLAN:  1. JAK2 testing was approved so we will send this off today.  Interestingly, her sister has a JAK2 positive myeloproliferative disorder that sounds like ET.  2. I will see her back in 1 month with a CBC and an appointment for therapeutic phlebotomy if indicated at that time.  No phlebotomy today.  3. She will continue aspirin and I advised 81 mg once daily.

## 2022-03-18 ENCOUNTER — OFFICE VISIT (OUTPATIENT)
Dept: ONCOLOGY | Facility: CLINIC | Age: 67
End: 2022-03-18

## 2022-03-18 ENCOUNTER — LAB (OUTPATIENT)
Dept: OTHER | Facility: HOSPITAL | Age: 67
End: 2022-03-18

## 2022-03-18 VITALS
WEIGHT: 146.5 LBS | BODY MASS INDEX: 23.54 KG/M2 | HEART RATE: 71 BPM | TEMPERATURE: 97.6 F | OXYGEN SATURATION: 100 % | SYSTOLIC BLOOD PRESSURE: 97 MMHG | DIASTOLIC BLOOD PRESSURE: 55 MMHG | RESPIRATION RATE: 16 BRPM | HEIGHT: 66 IN

## 2022-03-18 DIAGNOSIS — D75.1 ERYTHROCYTOSIS: Primary | ICD-10-CM

## 2022-03-18 DIAGNOSIS — D75.1 ERYTHROCYTOSIS: ICD-10-CM

## 2022-03-18 LAB
BASOPHILS # BLD AUTO: 0.07 10*3/MM3 (ref 0–0.2)
BASOPHILS NFR BLD AUTO: 1.1 % (ref 0–1.5)
DEPRECATED RDW RBC AUTO: 51.9 FL (ref 37–54)
EOSINOPHIL # BLD AUTO: 0.11 10*3/MM3 (ref 0–0.4)
EOSINOPHIL NFR BLD AUTO: 1.8 % (ref 0.3–6.2)
ERYTHROCYTE [DISTWIDTH] IN BLOOD BY AUTOMATED COUNT: 15.5 % (ref 12.3–15.4)
HCT VFR BLD AUTO: 50.3 % (ref 34–46.6)
HGB BLD-MCNC: 16.1 G/DL (ref 12–15.9)
IMM GRANULOCYTES # BLD AUTO: 0.01 10*3/MM3 (ref 0–0.05)
IMM GRANULOCYTES NFR BLD AUTO: 0.2 % (ref 0–0.5)
LYMPHOCYTES # BLD AUTO: 1.93 10*3/MM3 (ref 0.7–3.1)
LYMPHOCYTES NFR BLD AUTO: 31.6 % (ref 19.6–45.3)
MCH RBC QN AUTO: 29 PG (ref 26.6–33)
MCHC RBC AUTO-ENTMCNC: 32 G/DL (ref 31.5–35.7)
MCV RBC AUTO: 90.6 FL (ref 79–97)
MONOCYTES # BLD AUTO: 0.46 10*3/MM3 (ref 0.1–0.9)
MONOCYTES NFR BLD AUTO: 7.5 % (ref 5–12)
NEUTROPHILS NFR BLD AUTO: 3.53 10*3/MM3 (ref 1.7–7)
NEUTROPHILS NFR BLD AUTO: 57.8 % (ref 42.7–76)
NRBC BLD AUTO-RTO: 0 /100 WBC (ref 0–0.2)
PLATELET # BLD AUTO: 323 10*3/MM3 (ref 140–450)
PMV BLD AUTO: 10.4 FL (ref 6–12)
RBC # BLD AUTO: 5.55 10*6/MM3 (ref 3.77–5.28)
WBC NRBC COR # BLD: 6.11 10*3/MM3 (ref 3.4–10.8)

## 2022-03-18 PROCEDURE — 99213 OFFICE O/P EST LOW 20 MIN: CPT | Performed by: INTERNAL MEDICINE

## 2022-03-18 PROCEDURE — 85025 COMPLETE CBC W/AUTO DIFF WBC: CPT | Performed by: INTERNAL MEDICINE

## 2022-03-22 LAB — REF LAB TEST METHOD: NORMAL

## 2022-03-24 ENCOUNTER — TELEPHONE (OUTPATIENT)
Dept: ONCOLOGY | Facility: CLINIC | Age: 67
End: 2022-03-24

## 2022-03-24 PROBLEM — Z15.89 JAK-2 GENE MUTATION: Status: ACTIVE | Noted: 2022-03-24

## 2022-03-24 RX ORDER — SODIUM CHLORIDE 9 MG/ML
250 INJECTION, SOLUTION INTRAVENOUS ONCE
Status: CANCELLED | OUTPATIENT
Start: 2022-03-25

## 2022-03-24 NOTE — TELEPHONE ENCOUNTER
----- Message from Kaylah Franklin RN sent at 3/24/2022  8:16 AM EDT -----  Regarding: DANO  Please schedule pt for first available Phlebotomy appt at [email or web address]. Thanks!

## 2022-03-24 NOTE — PROGRESS NOTES
Pt is JAK2 +. Per Dr. Pedraza pt will have a therapeutic phlebotomy to keep her HCT less than 42%. Therapy plan placed.

## 2022-03-28 ENCOUNTER — INFUSION (OUTPATIENT)
Dept: ONCOLOGY | Facility: HOSPITAL | Age: 67
End: 2022-03-28

## 2022-03-28 ENCOUNTER — LAB (OUTPATIENT)
Dept: OTHER | Facility: HOSPITAL | Age: 67
End: 2022-03-28

## 2022-03-28 VITALS
RESPIRATION RATE: 18 BRPM | WEIGHT: 146.4 LBS | SYSTOLIC BLOOD PRESSURE: 110 MMHG | BODY MASS INDEX: 22.98 KG/M2 | DIASTOLIC BLOOD PRESSURE: 68 MMHG | HEIGHT: 67 IN | OXYGEN SATURATION: 100 % | HEART RATE: 70 BPM | TEMPERATURE: 96.8 F

## 2022-03-28 DIAGNOSIS — Z15.89 JAK-2 GENE MUTATION: Primary | ICD-10-CM

## 2022-03-28 DIAGNOSIS — D75.1 ERYTHROCYTOSIS: ICD-10-CM

## 2022-03-28 LAB
BASOPHILS # BLD AUTO: 0.06 10*3/MM3 (ref 0–0.2)
BASOPHILS NFR BLD AUTO: 0.8 % (ref 0–1.5)
DEPRECATED RDW RBC AUTO: 51.2 FL (ref 37–54)
EOSINOPHIL # BLD AUTO: 0.1 10*3/MM3 (ref 0–0.4)
EOSINOPHIL NFR BLD AUTO: 1.4 % (ref 0.3–6.2)
ERYTHROCYTE [DISTWIDTH] IN BLOOD BY AUTOMATED COUNT: 15.7 % (ref 12.3–15.4)
HCT VFR BLD AUTO: 48.3 % (ref 34–46.6)
HGB BLD-MCNC: 16 G/DL (ref 12–15.9)
IMM GRANULOCYTES # BLD AUTO: 0.05 10*3/MM3 (ref 0–0.05)
IMM GRANULOCYTES NFR BLD AUTO: 0.7 % (ref 0–0.5)
LYMPHOCYTES # BLD AUTO: 1.44 10*3/MM3 (ref 0.7–3.1)
LYMPHOCYTES NFR BLD AUTO: 19.7 % (ref 19.6–45.3)
MCH RBC QN AUTO: 29.6 PG (ref 26.6–33)
MCHC RBC AUTO-ENTMCNC: 33.1 G/DL (ref 31.5–35.7)
MCV RBC AUTO: 89.4 FL (ref 79–97)
MONOCYTES # BLD AUTO: 0.75 10*3/MM3 (ref 0.1–0.9)
MONOCYTES NFR BLD AUTO: 10.3 % (ref 5–12)
NEUTROPHILS NFR BLD AUTO: 4.9 10*3/MM3 (ref 1.7–7)
NEUTROPHILS NFR BLD AUTO: 67.1 % (ref 42.7–76)
NRBC BLD AUTO-RTO: 0 /100 WBC (ref 0–0.2)
PLATELET # BLD AUTO: 324 10*3/MM3 (ref 140–450)
PMV BLD AUTO: 10.5 FL (ref 6–12)
RBC # BLD AUTO: 5.4 10*6/MM3 (ref 3.77–5.28)
WBC NRBC COR # BLD: 7.3 10*3/MM3 (ref 3.4–10.8)

## 2022-03-28 PROCEDURE — 96360 HYDRATION IV INFUSION INIT: CPT

## 2022-03-28 PROCEDURE — 85025 COMPLETE CBC W/AUTO DIFF WBC: CPT | Performed by: INTERNAL MEDICINE

## 2022-03-28 PROCEDURE — 99195 PHLEBOTOMY: CPT

## 2022-03-28 PROCEDURE — 36415 COLL VENOUS BLD VENIPUNCTURE: CPT

## 2022-03-28 RX ORDER — SODIUM CHLORIDE 9 MG/ML
250 INJECTION, SOLUTION INTRAVENOUS ONCE
Status: COMPLETED | OUTPATIENT
Start: 2022-03-28 | End: 2022-03-28

## 2022-03-28 RX ORDER — SODIUM CHLORIDE 9 MG/ML
250 INJECTION, SOLUTION INTRAVENOUS CONTINUOUS
Status: DISCONTINUED | OUTPATIENT
Start: 2022-03-28 | End: 2022-07-29 | Stop reason: HOSPADM

## 2022-03-28 RX ORDER — SODIUM CHLORIDE 9 MG/ML
250 INJECTION, SOLUTION INTRAVENOUS ONCE
Status: CANCELLED | OUTPATIENT
Start: 2022-03-28

## 2022-03-28 RX ADMIN — SODIUM CHLORIDE 250 ML: 900 INJECTION, SOLUTION INTRAVENOUS at 14:30

## 2022-03-28 RX ADMIN — SODIUM CHLORIDE 250 ML: 900 INJECTION, SOLUTION INTRAVENOUS at 14:04

## 2022-03-28 RX ADMIN — SODIUM CHLORIDE 250 ML: 9 INJECTION, SOLUTION INTRAVENOUS at 13:40

## 2022-03-28 NOTE — NURSING NOTE
After a total of 750ml NS, pt denies further complaints.  BP stable and pt discharged ambulatory and instructed to call our office with any concerns.

## 2022-04-27 NOTE — PROGRESS NOTES
"[facility] GROUP OUTPATIENT FOLLOW UP CLINIC VISIT    REASON FOR FOLLOW-UP:    Erythrocytosis  JAK2 V617F mutation positive    HISTORY OF PRESENT ILLNESS:  Fred Farr is a 67 y.o. female who returns today for follow up of the above issue.      She tolerated therapeutic phlebotomy very well although due to chronic hypotension she had some lightheadedness requiring IV fluids.  She now feels better.  She has a lot of questions today.  Sleep apnea is under good control with her dental appliance.      REVIEW OF SYSTEMS:  As per the HPI    PHYSICAL EXAMINATION:    Vitals:    04/29/22 0753   BP: 91/60   Resp: 16   Temp: 96.8 °F (36 °C)   TempSrc: Temporal   SpO2: 97%   Weight: 67.4 kg (148 lb 8 oz)   Height: 168.9 cm (66.5\")   PainSc: 0-No pain  Comment: erythrocytosis     General:  No acute distress, awake, alert and oriented  Skin:  Warm and dry, no visible rash  HEENT:  Normocephalic/atraumatic.  Wearing a face mask.  Chest:  Normal respiratory effort  Extremities:  No visible clubbing, cyanosis, or edema  Neuro/psych:  Grossly nonfocal.  Normal mood and affect.      DIAGNOSTIC DATA:  CBC & Differential (04/29/2022 07:50)      IMAGING: None reviewed    ASSESSMENT:  This is a 67 y.o. female with:    *Polycythemia vera, JAK2 V617F mutation positive  · Hgb normal until 11/12/2020 with hgb 16.0, hct 49.8%.  · Hct mildly elevated since 2016 at 47-48%  · She, however, states that this has been a mild and chronic issue  · She does take aspirin 81 mg daily  · She states that her Fitbit tells her that her oxygen levels dropped a little bit at night.  She carries no formal diagnosis of sleep apnea.  No congenital heart problems.  No significant time at high altitudes.  She does not smoke.  She denies any carbon monoxide exposure.  · She was seen initially on 1/8/2021.  Hemoglobin 15.8 with hematocrit 48.3%.  Laboratory evaluation pursued.  Erythropoietin normal at 4.5.  Carbon monoxide normal at 2.0.  Ferritin normal " at 99.6 with normal iron studies.  · She was referred to sleep medicine because of the erythrocytosis and because her Fitbit was telling her that her oxygen level dropped at night.  She saw Dr. Dhillon.  She was found to have mild obstructive sleep apnea by home sleep study 5/24/2021.  She has an appliance now which she is using.  This was recently replaced as her old one fractured.  · JAK2 V617F mutation positive  · Therapeutic phlebotomy initiated 3/28/2022 with a hematocrit of 48.3%.  Goal less than 42%.  · 4/29/2022: Hemoglobin improved at 15.3 with hematocrit improved to 47%.  Still above our goal of 42%.     PLAN:  1. Therapeutic phlebotomy is necessary today  2. She has consumed more fluids but we will give her IV fluids as necessary today as well  3. She continues aspirin at 182 mg daily which is fine.  If she has increased bleeding or bruising, decrease to 81 mg daily.  4. Follow-up in about 1 month with a CBC and reticulocyte count and therapeutic phlebotomy as appropriate.  Goal hematocrit less than 42%.    I spent 30 minutes in this visit today reviewing her record, communicating with her, placing orders, documenting encounter.  This was her first visit after her JAK2 test came back positive.  She had a lot of questions which I answered to her apparent satisfaction.

## 2022-04-29 ENCOUNTER — LAB (OUTPATIENT)
Dept: OTHER | Facility: HOSPITAL | Age: 67
End: 2022-04-29

## 2022-04-29 ENCOUNTER — INFUSION (OUTPATIENT)
Dept: ONCOLOGY | Facility: HOSPITAL | Age: 67
End: 2022-04-29

## 2022-04-29 ENCOUNTER — OFFICE VISIT (OUTPATIENT)
Dept: ONCOLOGY | Facility: CLINIC | Age: 67
End: 2022-04-29

## 2022-04-29 VITALS
WEIGHT: 148.5 LBS | TEMPERATURE: 96.8 F | OXYGEN SATURATION: 97 % | BODY MASS INDEX: 23.87 KG/M2 | HEIGHT: 66 IN | DIASTOLIC BLOOD PRESSURE: 60 MMHG | SYSTOLIC BLOOD PRESSURE: 91 MMHG | RESPIRATION RATE: 16 BRPM

## 2022-04-29 VITALS — SYSTOLIC BLOOD PRESSURE: 86 MMHG | HEART RATE: 64 BPM | DIASTOLIC BLOOD PRESSURE: 56 MMHG

## 2022-04-29 DIAGNOSIS — Z15.89 JAK-2 GENE MUTATION: Primary | ICD-10-CM

## 2022-04-29 DIAGNOSIS — D45 POLYCYTHEMIA VERA: Primary | ICD-10-CM

## 2022-04-29 DIAGNOSIS — D75.1 ERYTHROCYTOSIS: Primary | ICD-10-CM

## 2022-04-29 LAB
BASOPHILS # BLD AUTO: 0.1 10*3/MM3 (ref 0–0.2)
BASOPHILS NFR BLD AUTO: 1.6 % (ref 0–1.5)
DEPRECATED RDW RBC AUTO: 48.7 FL (ref 37–54)
EOSINOPHIL # BLD AUTO: 0.12 10*3/MM3 (ref 0–0.4)
EOSINOPHIL NFR BLD AUTO: 1.9 % (ref 0.3–6.2)
ERYTHROCYTE [DISTWIDTH] IN BLOOD BY AUTOMATED COUNT: 15 % (ref 12.3–15.4)
HCT VFR BLD AUTO: 47 % (ref 34–46.6)
HGB BLD-MCNC: 15.3 G/DL (ref 12–15.9)
IMM GRANULOCYTES # BLD AUTO: 0.03 10*3/MM3 (ref 0–0.05)
IMM GRANULOCYTES NFR BLD AUTO: 0.5 % (ref 0–0.5)
LYMPHOCYTES # BLD AUTO: 2.37 10*3/MM3 (ref 0.7–3.1)
LYMPHOCYTES NFR BLD AUTO: 37 % (ref 19.6–45.3)
MCH RBC QN AUTO: 28.9 PG (ref 26.6–33)
MCHC RBC AUTO-ENTMCNC: 32.6 G/DL (ref 31.5–35.7)
MCV RBC AUTO: 88.8 FL (ref 79–97)
MONOCYTES # BLD AUTO: 0.61 10*3/MM3 (ref 0.1–0.9)
MONOCYTES NFR BLD AUTO: 9.5 % (ref 5–12)
NEUTROPHILS NFR BLD AUTO: 3.17 10*3/MM3 (ref 1.7–7)
NEUTROPHILS NFR BLD AUTO: 49.5 % (ref 42.7–76)
NRBC BLD AUTO-RTO: 0 /100 WBC (ref 0–0.2)
PLATELET # BLD AUTO: 325 10*3/MM3 (ref 140–450)
PMV BLD AUTO: 10.7 FL (ref 6–12)
RBC # BLD AUTO: 5.29 10*6/MM3 (ref 3.77–5.28)
WBC NRBC COR # BLD: 6.4 10*3/MM3 (ref 3.4–10.8)

## 2022-04-29 PROCEDURE — 99195 PHLEBOTOMY: CPT

## 2022-04-29 PROCEDURE — 99214 OFFICE O/P EST MOD 30 MIN: CPT | Performed by: INTERNAL MEDICINE

## 2022-04-29 PROCEDURE — 36415 COLL VENOUS BLD VENIPUNCTURE: CPT

## 2022-04-29 PROCEDURE — 85025 COMPLETE CBC W/AUTO DIFF WBC: CPT | Performed by: INTERNAL MEDICINE

## 2022-04-29 PROCEDURE — 96360 HYDRATION IV INFUSION INIT: CPT

## 2022-04-29 RX ORDER — SODIUM CHLORIDE 9 MG/ML
250 INJECTION, SOLUTION INTRAVENOUS ONCE
Status: COMPLETED | OUTPATIENT
Start: 2022-04-29 | End: 2022-04-29

## 2022-04-29 RX ORDER — SODIUM CHLORIDE 9 MG/ML
250 INJECTION, SOLUTION INTRAVENOUS ONCE
Status: CANCELLED | OUTPATIENT
Start: 2022-04-29

## 2022-04-29 RX ADMIN — SODIUM CHLORIDE 250 ML: 9 INJECTION, SOLUTION INTRAVENOUS at 10:13

## 2022-04-29 RX ADMIN — SODIUM CHLORIDE 250 ML: 9 INJECTION, SOLUTION INTRAVENOUS at 09:51

## 2022-04-29 RX ADMIN — SODIUM CHLORIDE 250 ML: 9 INJECTION, SOLUTION INTRAVENOUS at 09:29

## 2022-04-29 RX ADMIN — SODIUM CHLORIDE 250 ML: 9 INJECTION, SOLUTION INTRAVENOUS at 09:14

## 2022-04-29 NOTE — NURSING NOTE
At end of phlebo (500ml) pt started feeling lightheaded. NS 250ml started. Unable to obtain automatic BP. No syncopal event though.      0930: BP 70/40.  2nd bag of 250ml infusing.  Pt talkative and color is normal.  Will monitor    After 750ml NS total, BP still 78/53. Pt alert and oriented. Will give another 250ml NS and discuss with Dr. Pedraza for future phelbo's.    Per Dr. Pedraza, ok for dc as long as pt not symptomatic.  He sees pt next month again when she returns.  Pt may need 2 IV's placed next month.  One for IV fluids and one for the phlebo at the same time.  Pt received a total of 1000ml NS today after her 500ml phlebo.  She is up walking around with no issues.     1043: /62, HR 61. Pt feeling normal and is ready to leave for the day. She will focus on hydration before her next scheduled phlebo.

## 2022-05-11 ENCOUNTER — TRANSCRIBE ORDERS (OUTPATIENT)
Dept: SLEEP MEDICINE | Facility: HOSPITAL | Age: 67
End: 2022-05-11

## 2022-05-11 DIAGNOSIS — G47.33 OSA (OBSTRUCTIVE SLEEP APNEA): Primary | ICD-10-CM

## 2022-05-17 DIAGNOSIS — E78.5 HYPERLIPIDEMIA, UNSPECIFIED HYPERLIPIDEMIA TYPE: ICD-10-CM

## 2022-05-17 DIAGNOSIS — R79.89 ABNORMAL CBC: Primary | ICD-10-CM

## 2022-05-18 LAB
ALBUMIN SERPL-MCNC: 4.4 G/DL (ref 3.8–4.8)
ALBUMIN/GLOB SERPL: 1.8 {RATIO} (ref 1.2–2.2)
ALP SERPL-CCNC: 90 IU/L (ref 44–121)
ALT SERPL-CCNC: 23 IU/L (ref 0–32)
AST SERPL-CCNC: 32 IU/L (ref 0–40)
BASOPHILS # BLD AUTO: 0.1 X10E3/UL (ref 0–0.2)
BASOPHILS NFR BLD AUTO: 1 %
BILIRUB SERPL-MCNC: 0.3 MG/DL (ref 0–1.2)
BUN SERPL-MCNC: 18 MG/DL (ref 8–27)
BUN/CREAT SERPL: 17 (ref 12–28)
CALCIUM SERPL-MCNC: 9.5 MG/DL (ref 8.7–10.3)
CHLORIDE SERPL-SCNC: 102 MMOL/L (ref 96–106)
CHOLEST SERPL-MCNC: 218 MG/DL (ref 100–199)
CO2 SERPL-SCNC: 20 MMOL/L (ref 20–29)
CREAT SERPL-MCNC: 1.05 MG/DL (ref 0.57–1)
EGFRCR SERPLBLD CKD-EPI 2021: 58 ML/MIN/1.73
EOSINOPHIL # BLD AUTO: 0.1 X10E3/UL (ref 0–0.4)
EOSINOPHIL NFR BLD AUTO: 1 %
ERYTHROCYTE [DISTWIDTH] IN BLOOD BY AUTOMATED COUNT: 12.9 % (ref 11.7–15.4)
GLOBULIN SER CALC-MCNC: 2.5 G/DL (ref 1.5–4.5)
GLUCOSE SERPL-MCNC: 84 MG/DL (ref 65–99)
HCT VFR BLD AUTO: 43.9 % (ref 34–46.6)
HDLC SERPL-MCNC: 78 MG/DL
HGB BLD-MCNC: 14 G/DL (ref 11.1–15.9)
IMM GRANULOCYTES # BLD AUTO: 0 X10E3/UL (ref 0–0.1)
IMM GRANULOCYTES NFR BLD AUTO: 0 %
LDLC SERPL CALC-MCNC: 131 MG/DL (ref 0–99)
LYMPHOCYTES # BLD AUTO: 2.1 X10E3/UL (ref 0.7–3.1)
LYMPHOCYTES NFR BLD AUTO: 32 %
MCH RBC QN AUTO: 28.7 PG (ref 26.6–33)
MCHC RBC AUTO-ENTMCNC: 31.9 G/DL (ref 31.5–35.7)
MCV RBC AUTO: 90 FL (ref 79–97)
MONOCYTES # BLD AUTO: 0.6 X10E3/UL (ref 0.1–0.9)
MONOCYTES NFR BLD AUTO: 9 %
NEUTROPHILS # BLD AUTO: 3.6 X10E3/UL (ref 1.4–7)
NEUTROPHILS NFR BLD AUTO: 57 %
PLATELET # BLD AUTO: 348 X10E3/UL (ref 150–450)
POTASSIUM SERPL-SCNC: 5 MMOL/L (ref 3.5–5.2)
PROT SERPL-MCNC: 6.9 G/DL (ref 6–8.5)
RBC # BLD AUTO: 4.87 X10E6/UL (ref 3.77–5.28)
SODIUM SERPL-SCNC: 138 MMOL/L (ref 134–144)
TRIGL SERPL-MCNC: 52 MG/DL (ref 0–149)
VLDLC SERPL CALC-MCNC: 9 MG/DL (ref 5–40)
WBC # BLD AUTO: 6.4 X10E3/UL (ref 3.4–10.8)

## 2022-05-20 ENCOUNTER — OFFICE VISIT (OUTPATIENT)
Dept: INTERNAL MEDICINE | Facility: CLINIC | Age: 67
End: 2022-05-20

## 2022-05-20 VITALS
SYSTOLIC BLOOD PRESSURE: 108 MMHG | HEIGHT: 67 IN | DIASTOLIC BLOOD PRESSURE: 68 MMHG | BODY MASS INDEX: 23.39 KG/M2 | WEIGHT: 149 LBS | HEART RATE: 71 BPM

## 2022-05-20 DIAGNOSIS — E78.00 ELEVATED LDL CHOLESTEROL LEVEL: ICD-10-CM

## 2022-05-20 DIAGNOSIS — D45 POLYCYTHEMIA VERA: Primary | ICD-10-CM

## 2022-05-20 DIAGNOSIS — R45.89 DEPRESSED MOOD: ICD-10-CM

## 2022-05-20 PROCEDURE — 99214 OFFICE O/P EST MOD 30 MIN: CPT | Performed by: INTERNAL MEDICINE

## 2022-05-20 NOTE — PROGRESS NOTES
Chief Complaint   Patient presents with   • elevated ldl   • depressed mood       History of Present Illness   Fred Farr is a 67 y.o. female presents for follow up evaluation w/ acute needs. She has polycythemia vera w/ positive JAK2 mutation. She is getting phlebotomy for this and is wearing a dental device for FERDINAND. H/h now 14/ 43.9 down from 16.2/ 43.9. can get hypotensive w/ phlebotomy. She gets iv hydration for this.   Notes that she had some decreased mood w/ increased anxious symptoms. This is improving with increased movement. And continues on wellbutrin. Concerned of cognitive function issues.  LDL-c now increased to 131 for 117.   Rare usage nsaid. Cr 1.05.             The following portions of the patient's history were reviewed and updated as appropriate: allergies, current medications, past family history, past medical history, past social history, past surgical history and problem list.  Current Outpatient Medications on File Prior to Visit   Medication Sig Dispense Refill   • acyclovir (ZOVIRAX) 5 % ointment Apply to affected area tid prn 15 g 0   • aspirin 81 MG EC tablet Take 81 mg by mouth Daily. 2 pills     • buPROPion XL (WELLBUTRIN XL) 150 MG 24 hr tablet TAKE 1 TABLET BY MOUTH IN  THE MORNING 90 tablet 3   • montelukast (SINGULAIR) 10 MG tablet TAKE 1 TABLET BY MOUTH AT  NIGHT 90 tablet 3   • valACYclovir (Valtrex) 1000 MG tablet Take 2 tablets by mouth 2 (Two) Times a Day. 4 tablet 5     Current Facility-Administered Medications on File Prior to Visit   Medication Dose Route Frequency Provider Last Rate Last Admin   • sodium chloride 0.9 % infusion 250 mL  250 mL Intravenous Continuous Latrice Kaur APRN   Stopped at 03/28/22 1427   • sodium chloride 0.9 % infusion 250 mL  250 mL Intravenous Continuous Latrice Kaur APRN   Stopped at 03/28/22 1450     Review of Systems   Constitutional: Negative.    HENT: Negative.    Eyes: Negative.    Respiratory: Negative.   "  Cardiovascular: Negative.    Gastrointestinal: Negative.    Endocrine: Negative.    Genitourinary: Negative.    Musculoskeletal: Negative.    Allergic/Immunologic: Negative.    Neurological: Negative.    Hematological: Negative.    Psychiatric/Behavioral: Negative.        Objective   Physical Exam  Vitals and nursing note reviewed.   Constitutional:       Appearance: Normal appearance. She is well-developed.   HENT:      Head: Normocephalic and atraumatic.      Right Ear: Tympanic membrane and external ear normal.      Left Ear: Tympanic membrane and external ear normal.      Nose: Nose normal.      Mouth/Throat:      Mouth: Mucous membranes are moist.   Eyes:      Extraocular Movements: Extraocular movements intact.      Pupils: Pupils are equal, round, and reactive to light.   Cardiovascular:      Rate and Rhythm: Normal rate and regular rhythm.      Pulses: Normal pulses.      Heart sounds: Normal heart sounds.   Pulmonary:      Effort: Pulmonary effort is normal. No respiratory distress.      Breath sounds: Normal breath sounds.   Abdominal:      General: Abdomen is flat.      Palpations: Abdomen is soft.   Musculoskeletal:         General: Normal range of motion.      Cervical back: Normal range of motion and neck supple.   Skin:     General: Skin is warm and dry.   Neurological:      General: No focal deficit present.      Mental Status: She is alert and oriented to person, place, and time.   Psychiatric:         Mood and Affect: Mood normal.         Behavior: Behavior normal.         Thought Content: Thought content normal.         Judgment: Judgment normal.          /68   Pulse 71   Ht 168.9 cm (66.5\")   Wt 67.6 kg (149 lb)   BMI 23.69 kg/m²     Assessment & Plan   Diagnoses and all orders for this visit:    Polycythemia vera (HCC)    Depressed mood    Elevated LDL cholesterol level      Patient polycythemia vera. She will continue phlebotomy prn for this. She will continue to treat gui. She has " history depressed mood that is now improving w/ increased movement and encouraged to continue this. She had concerns of cognition but no cognitive dysfunction noted on examination today. She is reassured. She is to initiate a low fat diet. Will test cardiac package at [facility].

## 2022-05-26 NOTE — PROGRESS NOTES
"[facility] CBC GROUP OUTPATIENT FOLLOW UP CLINIC VISIT    REASON FOR FOLLOW-UP:    JAK2 V617F mutation positive polycythemia vera  Current therapy with therapeutic phlebotomy    HISTORY OF PRESENT ILLNESS:  Fred Farr is a 67 y.o. female who returns today for follow up of the above issue.      She generally feels well.  She has tolerated phlebotomy although with some hypotension immediately following phlebotomy.  She has required a lot of IV fluid hydration.  She drank a lot this morning in preparation for possible phlebotomy.    REVIEW OF SYSTEMS:  As per the HPI    PHYSICAL EXAMINATION:    Vitals:    05/27/22 0754   BP: 114/71   Pulse: 65   Resp: 16   Temp: 97.8 °F (36.6 °C)   TempSrc: Temporal   SpO2: 99%   Weight: 68.3 kg (150 lb 8 oz)   Height: 168.9 cm (66.5\")   PainSc: 0-No pain  Comment: Polycythemia vera     General:  No acute distress, awake, alert and oriented  Skin:  Warm and dry, no visible rash  HEENT:  Normocephalic/atraumatic.  Wearing a face mask.  Chest:  Normal respiratory effort  Extremities:  No visible clubbing, cyanosis, or edema  Neuro/psych:  Grossly nonfocal.  Normal mood and affect.      DIAGNOSTIC DATA:  CBC & Differential (05/27/2022 07:54)  Retic With IRF & RET-He (05/27/2022 07:54)      IMAGING: None reviewed    ASSESSMENT:  This is a 67 y.o. female with:    *Polycythemia vera, JAK2 V617F mutation positive  · Hgb normal until 11/12/2020 with hgb 16.0, hct 49.8%.  · Hct mildly elevated since 2016 at 47-48%  · She, however, states that this has been a mild and chronic issue  · She does take aspirin 81 mg daily  · She states that her Fitbit tells her that her oxygen levels dropped a little bit at night.  She carries no formal diagnosis of sleep apnea.  No congenital heart problems.  No significant time at high altitudes.  She does not smoke.  She denies any carbon monoxide exposure.  · She was seen initially on 1/8/2021.  Hemoglobin 15.8 with hematocrit 48.3%.  Laboratory " evaluation pursued.  Erythropoietin normal at 4.5.  Carbon monoxide normal at 2.0.  Ferritin normal at 99.6 with normal iron studies.  · She was referred to sleep medicine because of the erythrocytosis and because her Fitbit was telling her that her oxygen level dropped at night.  She saw Dr. Dhillon.  She was found to have mild obstructive sleep apnea by home sleep study 5/24/2021.  She has an appliance now which she is using.  This was recently replaced as her old one fractured.  · JAK2 V617F mutation positive  · Therapeutic phlebotomy initiated 3/28/2022 with a hematocrit of 48.3%.  Goal less than 42%.  · 4/29/2022: Hemoglobin improved at 15.3 with hematocrit improved to 47%.  Still above our goal of 42%.  · 5/27/2022: Hematocrit near goal at 42.7%.  We will not perform therapeutic phlebotomy today.  · We have not yet initiated hydroxyurea, though this may be necessary     PLAN:  1. Therapeutic phlebotomy is not necessary today as her hematocrit is near goal and she prefers not to have a phlebotomy today.  2. Continue aspirin  3. Return in about 6 weeks or so with a CBC and appointment for therapeutic phlebotomy if appropriate for a hematocrit of greater than 42%.  I will see her back in about 6 weeks with the same.

## 2022-05-27 ENCOUNTER — LAB (OUTPATIENT)
Dept: OTHER | Facility: HOSPITAL | Age: 67
End: 2022-05-27

## 2022-05-27 ENCOUNTER — APPOINTMENT (OUTPATIENT)
Dept: ONCOLOGY | Facility: HOSPITAL | Age: 67
End: 2022-05-27

## 2022-05-27 ENCOUNTER — OFFICE VISIT (OUTPATIENT)
Dept: ONCOLOGY | Facility: CLINIC | Age: 67
End: 2022-05-27

## 2022-05-27 VITALS
BODY MASS INDEX: 24.19 KG/M2 | TEMPERATURE: 97.8 F | WEIGHT: 150.5 LBS | SYSTOLIC BLOOD PRESSURE: 114 MMHG | RESPIRATION RATE: 16 BRPM | DIASTOLIC BLOOD PRESSURE: 71 MMHG | OXYGEN SATURATION: 99 % | HEART RATE: 65 BPM | HEIGHT: 66 IN

## 2022-05-27 DIAGNOSIS — D45 POLYCYTHEMIA VERA: Primary | ICD-10-CM

## 2022-05-27 DIAGNOSIS — D45 POLYCYTHEMIA VERA: ICD-10-CM

## 2022-05-27 LAB
BASOPHILS # BLD AUTO: 0.06 10*3/MM3 (ref 0–0.2)
BASOPHILS NFR BLD AUTO: 1 % (ref 0–1.5)
DEPRECATED RDW RBC AUTO: 45.9 FL (ref 37–54)
EOSINOPHIL # BLD AUTO: 0.13 10*3/MM3 (ref 0–0.4)
EOSINOPHIL NFR BLD AUTO: 2.1 % (ref 0.3–6.2)
ERYTHROCYTE [DISTWIDTH] IN BLOOD BY AUTOMATED COUNT: 14.4 % (ref 12.3–15.4)
HCT VFR BLD AUTO: 42.7 % (ref 34–46.6)
HGB BLD-MCNC: 13.9 G/DL (ref 12–15.9)
HGB RETIC QN AUTO: 29.4 PG (ref 29.8–36.1)
IMM GRANULOCYTES # BLD AUTO: 0.03 10*3/MM3 (ref 0–0.05)
IMM GRANULOCYTES NFR BLD AUTO: 0.5 % (ref 0–0.5)
IMM RETICS NFR: 9 % (ref 3–15.8)
LYMPHOCYTES # BLD AUTO: 2.15 10*3/MM3 (ref 0.7–3.1)
LYMPHOCYTES NFR BLD AUTO: 35.1 % (ref 19.6–45.3)
MCH RBC QN AUTO: 28.4 PG (ref 26.6–33)
MCHC RBC AUTO-ENTMCNC: 32.6 G/DL (ref 31.5–35.7)
MCV RBC AUTO: 87.1 FL (ref 79–97)
MONOCYTES # BLD AUTO: 0.71 10*3/MM3 (ref 0.1–0.9)
MONOCYTES NFR BLD AUTO: 11.6 % (ref 5–12)
NEUTROPHILS NFR BLD AUTO: 3.05 10*3/MM3 (ref 1.7–7)
NEUTROPHILS NFR BLD AUTO: 49.7 % (ref 42.7–76)
NRBC BLD AUTO-RTO: 0 /100 WBC (ref 0–0.2)
PLATELET # BLD AUTO: 303 10*3/MM3 (ref 140–450)
PMV BLD AUTO: 11 FL (ref 6–12)
RBC # BLD AUTO: 4.9 10*6/MM3 (ref 3.77–5.28)
RETICS # AUTO: 0.06 10*6/MM3 (ref 0.02–0.13)
RETICS/RBC NFR AUTO: 1.14 % (ref 0.7–1.9)
WBC NRBC COR # BLD: 6.13 10*3/MM3 (ref 3.4–10.8)

## 2022-05-27 PROCEDURE — 36415 COLL VENOUS BLD VENIPUNCTURE: CPT

## 2022-05-27 PROCEDURE — 85025 COMPLETE CBC W/AUTO DIFF WBC: CPT | Performed by: INTERNAL MEDICINE

## 2022-05-27 PROCEDURE — 99213 OFFICE O/P EST LOW 20 MIN: CPT | Performed by: INTERNAL MEDICINE

## 2022-05-27 PROCEDURE — 85046 RETICYTE/HGB CONCENTRATE: CPT | Performed by: INTERNAL MEDICINE

## 2022-05-31 ENCOUNTER — APPOINTMENT (OUTPATIENT)
Dept: BONE DENSITY | Facility: HOSPITAL | Age: 67
End: 2022-05-31

## 2022-06-10 ENCOUNTER — OFFICE VISIT (OUTPATIENT)
Dept: INTERNAL MEDICINE | Facility: CLINIC | Age: 67
End: 2022-06-10

## 2022-06-10 VITALS
HEART RATE: 72 BPM | WEIGHT: 149 LBS | RESPIRATION RATE: 16 BRPM | TEMPERATURE: 96.9 F | HEIGHT: 67 IN | BODY MASS INDEX: 23.39 KG/M2

## 2022-06-10 DIAGNOSIS — J06.9 VIRAL UPPER RESPIRATORY TRACT INFECTION: ICD-10-CM

## 2022-06-10 DIAGNOSIS — J20.9 ACUTE BRONCHITIS, UNSPECIFIED ORGANISM: Primary | ICD-10-CM

## 2022-06-10 PROCEDURE — 99213 OFFICE O/P EST LOW 20 MIN: CPT | Performed by: NURSE PRACTITIONER

## 2022-06-10 RX ORDER — AMOXICILLIN AND CLAVULANATE POTASSIUM 875; 125 MG/1; MG/1
1 TABLET, FILM COATED ORAL 2 TIMES DAILY
Qty: 14 TABLET | Refills: 0 | Status: SHIPPED | OUTPATIENT
Start: 2022-06-10 | End: 2022-09-16

## 2022-06-10 NOTE — PROGRESS NOTES
Subjective   Fred Farr is a 67 y.o. female.   Chief Complaint   Patient presents with   • Cough     Vitals:    06/10/22 1246   Pulse: 72   Resp: 16   Temp: 96.9 °F (36.1 °C)     No LMP recorded. Patient is postmenopausal.    Ms Farr is a 67 year old female patient of Dr Caldera who is here for an acute visit. She c/o cough, congestion, and headaches that started on 6/6/22. She had a negative covid test . She is going out of town this weekend for the next week     Cough  This is a new problem. Episode onset: 5 days  The problem has been unchanged. The cough is productive of purulent sputum. Associated symptoms include headaches and postnasal drip. Pertinent negatives include no fever, hemoptysis, sore throat or shortness of breath. She has tried OTC cough suppressant and rest (sudafed ) for the symptoms.        The following portions of the patient's history were reviewed and updated as appropriate: allergies, current medications, past family history, past medical history, past social history, past surgical history and problem list.    Review of Systems   Constitutional: Positive for fatigue. Negative for fever.   HENT: Positive for postnasal drip. Negative for congestion and sore throat.    Respiratory: Positive for cough. Negative for hemoptysis and shortness of breath.    Cardiovascular: Negative.    Gastrointestinal: Negative.    Neurological: Positive for headaches.       Objective   Physical Exam    Assessment & Plan   Diagnoses and all orders for this visit:    1. Acute bronchitis, unspecified organism (Primary)    2. Viral upper respiratory tract infection    Other orders  -     amoxicillin-clavulanate (Augmentin) 875-125 MG per tablet; Take 1 tablet by mouth 2 (Two) Times a Day.  Dispense: 14 tablet; Refill: 0      Discussed with patient to treat symptoms now and it is a viral illness. Recommend mucinex, rest , fluids, and tylenol as needed  I did give her an antibiotic to hold on to and take as needed  since she is going out of town  Advised to follow up if symptoms worsen or new symptoms develop

## 2022-06-27 RX ORDER — ALBUTEROL SULFATE 90 UG/1
2 AEROSOL, METERED RESPIRATORY (INHALATION) EVERY 4 HOURS PRN
Qty: 18 G | Refills: 0 | Status: SHIPPED | OUTPATIENT
Start: 2022-06-27

## 2022-06-28 ENCOUNTER — HOSPITAL ENCOUNTER (OUTPATIENT)
Dept: BONE DENSITY | Facility: HOSPITAL | Age: 67
Discharge: HOME | End: 2022-06-28
Admitting: INTERNAL MEDICINE

## 2022-06-28 ENCOUNTER — HOSPITAL ENCOUNTER (OUTPATIENT)
Dept: SLEEP MEDICINE | Facility: HOSPITAL | Age: 67
End: 2022-06-28

## 2022-06-28 DIAGNOSIS — G47.33 OSA (OBSTRUCTIVE SLEEP APNEA): ICD-10-CM

## 2022-06-28 PROCEDURE — 77080 DXA BONE DENSITY AXIAL: CPT

## 2022-06-28 PROCEDURE — 95806 SLEEP STUDY UNATT&RESP EFFT: CPT

## 2022-06-28 PROCEDURE — 95806 SLEEP STUDY UNATT&RESP EFFT: CPT | Performed by: INTERNAL MEDICINE

## 2022-06-29 ENCOUNTER — HOSPITAL ENCOUNTER (OUTPATIENT)
Dept: SLEEP MEDICINE | Facility: HOSPITAL | Age: 67
Discharge: HOME | End: 2022-06-29
Admitting: INTERNAL MEDICINE

## 2022-06-29 DIAGNOSIS — G47.33 OSA (OBSTRUCTIVE SLEEP APNEA): ICD-10-CM

## 2022-06-29 PROCEDURE — 95806 SLEEP STUDY UNATT&RESP EFFT: CPT

## 2022-06-29 PROCEDURE — 95806 SLEEP STUDY UNATT&RESP EFFT: CPT | Performed by: INTERNAL MEDICINE

## 2022-06-30 ENCOUNTER — HOSPITAL ENCOUNTER (OUTPATIENT)
Dept: SLEEP MEDICINE | Facility: HOSPITAL | Age: 67
End: 2022-06-30

## 2022-06-30 DIAGNOSIS — G47.33 OSA (OBSTRUCTIVE SLEEP APNEA): ICD-10-CM

## 2022-06-30 PROCEDURE — 95806 SLEEP STUDY UNATT&RESP EFFT: CPT | Performed by: INTERNAL MEDICINE

## 2022-06-30 PROCEDURE — 95806 SLEEP STUDY UNATT&RESP EFFT: CPT

## 2022-07-07 ENCOUNTER — TELEPHONE (OUTPATIENT)
Dept: INTERNAL MEDICINE | Facility: CLINIC | Age: 67
End: 2022-07-07

## 2022-07-07 NOTE — TELEPHONE ENCOUNTER
Pt informed    ----- Message from Viri Caldera MD sent at 7/7/2022  3:43 PM EDT -----  Patient has osteopenia. It is advised that she consume 1500 mg of detary  calcium daily as well as 2000 iu vitamin d3 daily. She should also engage in routien weight bearing physical activity.   luis

## 2022-07-08 ENCOUNTER — INFUSION (OUTPATIENT)
Dept: ONCOLOGY | Facility: HOSPITAL | Age: 67
End: 2022-07-08

## 2022-07-08 ENCOUNTER — LAB (OUTPATIENT)
Dept: OTHER | Facility: HOSPITAL | Age: 67
End: 2022-07-08

## 2022-07-08 VITALS
HEART RATE: 62 BPM | TEMPERATURE: 95.9 F | WEIGHT: 151 LBS | RESPIRATION RATE: 18 BRPM | OXYGEN SATURATION: 100 % | HEIGHT: 67 IN | DIASTOLIC BLOOD PRESSURE: 60 MMHG | SYSTOLIC BLOOD PRESSURE: 91 MMHG | BODY MASS INDEX: 23.7 KG/M2

## 2022-07-08 DIAGNOSIS — Z15.89 JAK-2 GENE MUTATION: Primary | ICD-10-CM

## 2022-07-08 DIAGNOSIS — D45 POLYCYTHEMIA VERA: ICD-10-CM

## 2022-07-08 LAB
BASOPHILS # BLD AUTO: 0.07 10*3/MM3 (ref 0–0.2)
BASOPHILS NFR BLD AUTO: 1 % (ref 0–1.5)
DEPRECATED RDW RBC AUTO: 47.9 FL (ref 37–54)
EOSINOPHIL # BLD AUTO: 0.18 10*3/MM3 (ref 0–0.4)
EOSINOPHIL NFR BLD AUTO: 2.5 % (ref 0.3–6.2)
ERYTHROCYTE [DISTWIDTH] IN BLOOD BY AUTOMATED COUNT: 15.4 % (ref 12.3–15.4)
HCT VFR BLD AUTO: 43.8 % (ref 34–46.6)
HGB BLD-MCNC: 14.4 G/DL (ref 12–15.9)
HGB RETIC QN AUTO: 28.6 PG (ref 29.8–36.1)
IMM GRANULOCYTES # BLD AUTO: 0.03 10*3/MM3 (ref 0–0.05)
IMM GRANULOCYTES NFR BLD AUTO: 0.4 % (ref 0–0.5)
IMM RETICS NFR: 11.8 % (ref 3–15.8)
LYMPHOCYTES # BLD AUTO: 2.25 10*3/MM3 (ref 0.7–3.1)
LYMPHOCYTES NFR BLD AUTO: 31.2 % (ref 19.6–45.3)
MCH RBC QN AUTO: 28.1 PG (ref 26.6–33)
MCHC RBC AUTO-ENTMCNC: 32.9 G/DL (ref 31.5–35.7)
MCV RBC AUTO: 85.5 FL (ref 79–97)
MONOCYTES # BLD AUTO: 0.72 10*3/MM3 (ref 0.1–0.9)
MONOCYTES NFR BLD AUTO: 10 % (ref 5–12)
NEUTROPHILS NFR BLD AUTO: 3.97 10*3/MM3 (ref 1.7–7)
NEUTROPHILS NFR BLD AUTO: 54.9 % (ref 42.7–76)
NRBC BLD AUTO-RTO: 0 /100 WBC (ref 0–0.2)
PLATELET # BLD AUTO: 308 10*3/MM3 (ref 140–450)
PMV BLD AUTO: 11.1 FL (ref 6–12)
RBC # BLD AUTO: 5.12 10*6/MM3 (ref 3.77–5.28)
RETICS # AUTO: 0.06 10*6/MM3 (ref 0.02–0.13)
RETICS/RBC NFR AUTO: 1.22 % (ref 0.7–1.9)
WBC NRBC COR # BLD: 7.22 10*3/MM3 (ref 3.4–10.8)

## 2022-07-08 PROCEDURE — 85046 RETICYTE/HGB CONCENTRATE: CPT | Performed by: INTERNAL MEDICINE

## 2022-07-08 PROCEDURE — 99195 PHLEBOTOMY: CPT

## 2022-07-08 PROCEDURE — 36415 COLL VENOUS BLD VENIPUNCTURE: CPT

## 2022-07-08 PROCEDURE — 85025 COMPLETE CBC W/AUTO DIFF WBC: CPT | Performed by: INTERNAL MEDICINE

## 2022-07-08 RX ORDER — SODIUM CHLORIDE 9 MG/ML
250 INJECTION, SOLUTION INTRAVENOUS ONCE
Status: CANCELLED | OUTPATIENT
Start: 2022-08-19

## 2022-07-08 RX ORDER — SODIUM CHLORIDE 9 MG/ML
250 INJECTION, SOLUTION INTRAVENOUS ONCE
Status: CANCELLED | OUTPATIENT
Start: 2022-07-08

## 2022-07-08 RX ORDER — SODIUM CHLORIDE 9 MG/ML
250 INJECTION, SOLUTION INTRAVENOUS ONCE
Status: COMPLETED | OUTPATIENT
Start: 2022-07-08 | End: 2022-07-08

## 2022-07-08 RX ADMIN — SODIUM CHLORIDE 250 ML: 9 INJECTION, SOLUTION INTRAVENOUS at 08:38

## 2022-07-08 NOTE — NURSING NOTE
"0838 Pt's BP 74/50. Pt alert, good color, talking, states she feels \"fine\". 250cc NS infusing.    0902 Pt able to stand and walk in place for several minutes. States she feels \"good\" and \"better than last time\". Pt states her blood pressure historically runs low. Pt in no distress, alert, talking.    0903 Pt discharged in stable condition  "

## 2022-07-19 ENCOUNTER — TELEPHONE (OUTPATIENT)
Dept: SLEEP MEDICINE | Facility: HOSPITAL | Age: 67
End: 2022-07-19

## 2022-08-10 NOTE — PROGRESS NOTES
"[facility] GROUP OUTPATIENT FOLLOW UP CLINIC VISIT    REASON FOR FOLLOW-UP:    JAK2 V617F mutation positive polycythemia vera  Current therapy with therapeutic phlebotomy    HISTORY OF PRESENT ILLNESS:  Fred Farr is a 67 y.o. female who returns today for follow up of the above issue.      She required phlebotomy back in July.  She tolerated this well but had some fatigue following this.  She also at some point had a cough after an acute illness.  She saw an allergist.  Her cough has resolved without any further treatment.      REVIEW OF SYSTEMS:  As per the HPI    PHYSICAL EXAMINATION:    Vitals:    08/19/22 0752   BP: 100/63   Pulse: 61   Resp: 18   Temp: 97.1 °F (36.2 °C)   TempSrc: Temporal   SpO2: 98%   Weight: 69 kg (152 lb 3.2 oz)   Height: 168.9 cm (66.5\")   PainSc: 0-No pain      General:  No acute distress, awake, alert and oriented  Skin:  Warm and dry, no visible rash  HEENT:  Normocephalic/atraumatic.  Wearing a face mask.  Chest:  Normal respiratory effort  Extremities:  No visible clubbing, cyanosis, or edema  Neuro/psych:  Grossly nonfocal.  Normal mood and affect.          DIAGNOSTIC DATA:  Retic With IRF & RET-He (08/19/2022 07:42)  CBC & Differential (08/19/2022 07:42)      IMAGING:  None reviewed    ASSESSMENT:  This is a 67 y.o. female with:    *Polycythemia vera, JAK2 V617F mutation positive  · Hgb normal until 11/12/2020 with hgb 16.0, hct 49.8%.  · Hct mildly elevated since 2016 at 47-48%  · She, however, states that this has been a mild and chronic issue  · She does take aspirin 81 mg daily  · She states that her Fitbit tells her that her oxygen levels dropped a little bit at night.  She carries no formal diagnosis of sleep apnea.  No congenital heart problems.  No significant time at high altitudes.  She does not smoke.  She denies any carbon monoxide exposure.  · She was seen initially on 1/8/2021.  Hemoglobin 15.8 with hematocrit 48.3%.  Laboratory evaluation pursued.  " Erythropoietin normal at 4.5.  Carbon monoxide normal at 2.0.  Ferritin normal at 99.6 with normal iron studies.  · She was referred to sleep medicine because of the erythrocytosis and because her Fitbit was telling her that her oxygen level dropped at night.  She saw Dr. Dhillon.  She was found to have mild obstructive sleep apnea by home sleep study 5/24/2021.  She has an appliance now which she is using.  This was recently replaced as her old one fractured.  · JAK2 V617F mutation positive  · Therapeutic phlebotomy initiated 3/28/2022 with a hematocrit of 48.3%.  Goal less than 42%.  · 4/29/2022: Hemoglobin improved at 15.3 with hematocrit improved to 47%.  Still above our goal of 42%.  · 5/27/2022: Hematocrit near goal at 42.7%.  We did not perform therapeutic phlebotomy.  · For a hematocrit of 43.8% she did have phlebotomy on 7/8/2022  · We have not yet initiated hydroxyurea, though this may be necessary  · 8/19/2022: Hemoglobin 13.4 and hematocrit at goal at 40.8%     PLAN:  1. Therapeutic phlebotomy is not necessary today with a hematocrit of 40.8%  2. She will continue continue aspirin  3. Follow-up with me in 3 months with a CBC and reticulocyte count and therapeutic phlebotomy if needed for a hematocrit of 42% or greater.

## 2022-08-19 ENCOUNTER — APPOINTMENT (OUTPATIENT)
Dept: ONCOLOGY | Facility: HOSPITAL | Age: 67
End: 2022-08-19

## 2022-08-19 ENCOUNTER — LAB (OUTPATIENT)
Dept: OTHER | Facility: HOSPITAL | Age: 67
End: 2022-08-19

## 2022-08-19 ENCOUNTER — OFFICE VISIT (OUTPATIENT)
Dept: ONCOLOGY | Facility: CLINIC | Age: 67
End: 2022-08-19

## 2022-08-19 VITALS
HEART RATE: 61 BPM | SYSTOLIC BLOOD PRESSURE: 100 MMHG | TEMPERATURE: 97.1 F | HEIGHT: 66 IN | RESPIRATION RATE: 18 BRPM | WEIGHT: 152.2 LBS | DIASTOLIC BLOOD PRESSURE: 63 MMHG | OXYGEN SATURATION: 98 % | BODY MASS INDEX: 24.46 KG/M2

## 2022-08-19 DIAGNOSIS — D45 POLYCYTHEMIA VERA: ICD-10-CM

## 2022-08-19 DIAGNOSIS — D45 POLYCYTHEMIA VERA: Primary | ICD-10-CM

## 2022-08-19 LAB
BASOPHILS # BLD AUTO: 0.1 10*3/MM3 (ref 0–0.2)
BASOPHILS NFR BLD AUTO: 1.4 % (ref 0–1.5)
DEPRECATED RDW RBC AUTO: 47.8 FL (ref 37–54)
EOSINOPHIL # BLD AUTO: 0.18 10*3/MM3 (ref 0–0.4)
EOSINOPHIL NFR BLD AUTO: 2.5 % (ref 0.3–6.2)
ERYTHROCYTE [DISTWIDTH] IN BLOOD BY AUTOMATED COUNT: 16.3 % (ref 12.3–15.4)
HCT VFR BLD AUTO: 40.8 % (ref 34–46.6)
HGB BLD-MCNC: 13.4 G/DL (ref 12–15.9)
HGB RETIC QN AUTO: 26.3 PG (ref 29.8–36.1)
IMM GRANULOCYTES # BLD AUTO: 0.07 10*3/MM3 (ref 0–0.05)
IMM GRANULOCYTES NFR BLD AUTO: 1 % (ref 0–0.5)
IMM RETICS NFR: 13 % (ref 3–15.8)
LYMPHOCYTES # BLD AUTO: 2.28 10*3/MM3 (ref 0.7–3.1)
LYMPHOCYTES NFR BLD AUTO: 31.9 % (ref 19.6–45.3)
MCH RBC QN AUTO: 26.7 PG (ref 26.6–33)
MCHC RBC AUTO-ENTMCNC: 32.8 G/DL (ref 31.5–35.7)
MCV RBC AUTO: 81.4 FL (ref 79–97)
MONOCYTES # BLD AUTO: 0.88 10*3/MM3 (ref 0.1–0.9)
MONOCYTES NFR BLD AUTO: 12.3 % (ref 5–12)
NEUTROPHILS NFR BLD AUTO: 3.64 10*3/MM3 (ref 1.7–7)
NEUTROPHILS NFR BLD AUTO: 50.9 % (ref 42.7–76)
NRBC BLD AUTO-RTO: 0 /100 WBC (ref 0–0.2)
PLATELET # BLD AUTO: 317 10*3/MM3 (ref 140–450)
PMV BLD AUTO: 11.2 FL (ref 6–12)
RBC # BLD AUTO: 5.01 10*6/MM3 (ref 3.77–5.28)
RETICS # AUTO: 0.06 10*6/MM3 (ref 0.02–0.13)
RETICS/RBC NFR AUTO: 1.26 % (ref 0.7–1.9)
WBC NRBC COR # BLD: 7.15 10*3/MM3 (ref 3.4–10.8)

## 2022-08-19 PROCEDURE — 85025 COMPLETE CBC W/AUTO DIFF WBC: CPT | Performed by: INTERNAL MEDICINE

## 2022-08-19 PROCEDURE — 99213 OFFICE O/P EST LOW 20 MIN: CPT | Performed by: INTERNAL MEDICINE

## 2022-08-19 PROCEDURE — 36415 COLL VENOUS BLD VENIPUNCTURE: CPT

## 2022-08-19 PROCEDURE — 85046 RETICYTE/HGB CONCENTRATE: CPT | Performed by: INTERNAL MEDICINE

## 2022-08-19 RX ORDER — IPRATROPIUM BROMIDE 42 UG/1
SPRAY, METERED NASAL
COMMUNITY
Start: 2022-08-01

## 2022-08-19 RX ORDER — FLUNISOLIDE 0.25 MG/ML
2 SOLUTION NASAL 2 TIMES DAILY
COMMUNITY
Start: 2022-08-02

## 2022-08-26 RX ORDER — BUPROPION HYDROCHLORIDE 150 MG/1
TABLET ORAL
Qty: 90 TABLET | Refills: 3 | Status: SHIPPED | OUTPATIENT
Start: 2022-08-26

## 2022-09-08 ENCOUNTER — TRANSCRIBE ORDERS (OUTPATIENT)
Dept: ADMINISTRATIVE | Facility: HOSPITAL | Age: 67
End: 2022-09-08

## 2022-09-08 DIAGNOSIS — Z13.9 SCREENING FOR CONDITION: Primary | ICD-10-CM

## 2022-09-16 ENCOUNTER — OFFICE VISIT (OUTPATIENT)
Dept: INTERNAL MEDICINE | Facility: CLINIC | Age: 67
End: 2022-09-16

## 2022-09-16 VITALS
HEIGHT: 67 IN | BODY MASS INDEX: 23.07 KG/M2 | DIASTOLIC BLOOD PRESSURE: 60 MMHG | WEIGHT: 147 LBS | HEART RATE: 89 BPM | SYSTOLIC BLOOD PRESSURE: 108 MMHG | TEMPERATURE: 96.9 F | RESPIRATION RATE: 18 BRPM

## 2022-09-16 DIAGNOSIS — B00.1 RECURRENT COLD SORES: ICD-10-CM

## 2022-09-16 DIAGNOSIS — B37.31 VAGINAL YEAST INFECTION: Primary | ICD-10-CM

## 2022-09-16 PROCEDURE — 99213 OFFICE O/P EST LOW 20 MIN: CPT | Performed by: NURSE PRACTITIONER

## 2022-09-16 RX ORDER — FLUCONAZOLE 150 MG/1
150 TABLET ORAL ONCE
Qty: 2 TABLET | Refills: 0 | Status: SHIPPED | OUTPATIENT
Start: 2022-09-16 | End: 2022-09-16

## 2022-09-16 RX ORDER — VALACYCLOVIR HYDROCHLORIDE 1 G/1
2000 TABLET, FILM COATED ORAL 2 TIMES DAILY
Qty: 4 TABLET | Refills: 5 | Status: SHIPPED | OUTPATIENT
Start: 2022-09-16 | End: 2023-01-16 | Stop reason: SDUPTHER

## 2022-09-16 NOTE — PROGRESS NOTES
Subjective   Fred Farr is a 67 y.o. female.   Chief Complaint   Patient presents with   • Vaginal Itching     Vitals:    09/16/22 0934   BP: 108/60   Pulse: 89   Resp: 18   Temp: 96.9 °F (36.1 °C)     No LMP recorded. Patient is postmenopausal.    History of Present Illness  Mrs Farr is a 67 year old female patient who of Dr Caldera who is here for an acute visit. She c/o vaginal itching that has been intermittent for the last few weeks.   Vaginal Itching  The patient's primary symptoms include genital itching. The patient's pertinent negatives include no genital lesions, genital odor, pelvic pain or vaginal bleeding. This is a new problem. The current episode started 1 to 4 weeks ago. The problem occurs intermittently. The problem has been gradually improving. The patient is experiencing no pain. She is not pregnant. Pertinent negatives include no abdominal pain, dysuria, fever or flank pain.    she was concerned for pinworms because she had anal itching initially but that resolved. She stopped using soap and started using cetaphil which helped, for the last few days she has been only washing the area with water. She has tried Trimacinalone cream as well   She also has a cold sore and is requesting a refill on valtrex.       The following portions of the patient's history were reviewed and updated as appropriate: allergies, current medications, past family history, past medical history, past social history, past surgical history and problem list.    Review of Systems   Constitutional: Negative for fever.   HENT: Positive for mouth sores.    Gastrointestinal: Negative for abdominal pain.   Genitourinary: Negative for dyspareunia, dysuria, flank pain, genital sores and pelvic pain.        Vaginal itching        Objective   Physical Exam  Vitals and nursing note reviewed.   Constitutional:       General: She is not in acute distress.     Appearance: Normal appearance. She is well-developed and well-groomed.    Cardiovascular:      Rate and Rhythm: Normal rate.   Pulmonary:      Effort: Pulmonary effort is normal.   Genitourinary:     Labia:         Right: Rash present. No tenderness or lesion.         Left: Rash present. No tenderness or lesion.       Vagina: Vaginal discharge (white) and erythema present.   Skin:     General: Skin is warm and dry.   Neurological:      Mental Status: She is alert and oriented to person, place, and time.         Assessment & Plan   Diagnoses and all orders for this visit:    1. Vaginal yeast infection (Primary)    2. Recurrent cold sores    Other orders  -     fluconazole (Diflucan) 150 MG tablet; Take 1 tablet by mouth 1 (One) Time for 1 dose. May repeat in 72 hours if needed  Dispense: 2 tablet; Refill: 0  -     valACYclovir (Valtrex) 1000 MG tablet; Take 2 tablets by mouth 2 (Two) Times a Day.  Dispense: 4 tablet; Refill: 5      Will treat for vaginal yeast infection with diflucan. May repeat in 72 hours if needed . Wear loose fitting clothing and keep the area clean and dry   Follow up if symptoms persist, worsen of new symptoms develop

## 2022-09-20 ENCOUNTER — OFFICE VISIT (OUTPATIENT)
Dept: INTERNAL MEDICINE | Facility: CLINIC | Age: 67
End: 2022-09-20

## 2022-09-20 VITALS
BODY MASS INDEX: 23.63 KG/M2 | HEART RATE: 73 BPM | SYSTOLIC BLOOD PRESSURE: 128 MMHG | DIASTOLIC BLOOD PRESSURE: 78 MMHG | HEIGHT: 66 IN | WEIGHT: 147 LBS

## 2022-09-20 DIAGNOSIS — L29.0 RECTAL ITCHING: ICD-10-CM

## 2022-09-20 DIAGNOSIS — D45 POLYCYTHEMIA VERA: ICD-10-CM

## 2022-09-20 DIAGNOSIS — N89.8 VAGINA ITCHING: Primary | ICD-10-CM

## 2022-09-20 LAB
BILIRUB BLD-MCNC: NEGATIVE MG/DL
CLARITY, POC: CLEAR
COLOR UR: YELLOW
EXPIRATION DATE: NORMAL
GLUCOSE UR STRIP-MCNC: NEGATIVE MG/DL
KETONES UR QL: NEGATIVE
LEUKOCYTE EST, POC: NEGATIVE
Lab: NORMAL
NITRITE UR-MCNC: NEGATIVE MG/ML
PH UR: 5.5 [PH] (ref 5–8)
PROT UR STRIP-MCNC: NEGATIVE MG/DL
RBC # UR STRIP: NEGATIVE /UL
SP GR UR: 1.02 (ref 1–1.03)
UROBILINOGEN UR QL: NORMAL

## 2022-09-20 PROCEDURE — 99214 OFFICE O/P EST MOD 30 MIN: CPT | Performed by: INTERNAL MEDICINE

## 2022-09-20 PROCEDURE — 81003 URINALYSIS AUTO W/O SCOPE: CPT | Performed by: INTERNAL MEDICINE

## 2022-09-20 NOTE — PROGRESS NOTES
"Chief Complaint   Patient presents with   • Vaginal Itching     Burning sensation   • Anal Itching       History of Present Illness   Fred Farr is a 67 y.o. female presents for acute care. Patient is having vaginal and anal itching today. Patient has a fairly long history of this. She notes that 15 years ago she had anal itching. This improved after modifying soaps/ topicals etc. Has done fairly well until a few weeks ago. She notes some looser bm that are difficult to clean alternated w/ constipation.. She was using fairly aggressive wiping for this. Patient to NP last week. She was rx diflucan. She notes that after 2 doses modest improvement vaginally but not as much for the anal itch. She also used otc steroid cream and vagisil.     Patient recently diagnosed with polycytemia. She has been receiving phlebotomy. She did feel some fatigue \"for a while\" \"but im feeling better\".     The following portions of the patient's history were reviewed and updated as appropriate: allergies, current medications, past family history, past medical history, past social history, past surgical history and problem list.  Current Outpatient Medications on File Prior to Visit   Medication Sig Dispense Refill   • acyclovir (ZOVIRAX) 5 % ointment Apply to affected area tid prn 15 g 0   • albuterol sulfate  (90 Base) MCG/ACT inhaler Inhale 2 puffs Every 4 (Four) Hours As Needed for Wheezing. 18 g 0   • aspirin 81 MG EC tablet Take 81 mg by mouth Daily. 2 pills     • buPROPion XL (WELLBUTRIN XL) 150 MG 24 hr tablet TAKE 1 TABLET BY MOUTH IN  THE MORNING 90 tablet 3   • montelukast (SINGULAIR) 10 MG tablet TAKE 1 TABLET BY MOUTH AT  NIGHT 90 tablet 3   • valACYclovir (Valtrex) 1000 MG tablet Take 2 tablets by mouth 2 (Two) Times a Day. 4 tablet 5   • flunisolide (NASALIDE) 25 MCG/ACT (0.025%) solution nasal spray 2 sprays 2 (Two) Times a Day.     • ipratropium (ATROVENT) 0.06 % nasal spray USE 2 SPRAYS IN EACH NOSTRIL EVERY 6 " "HOURS AS NEEDED       No current facility-administered medications on file prior to visit.     Review of Systems   Constitutional: Negative.    HENT: Negative.    Eyes: Negative.    Respiratory: Negative.    Cardiovascular: Negative.    Gastrointestinal: Negative.    Endocrine: Negative.    Genitourinary:        Vaginal itching  Rectal itching     Musculoskeletal: Negative.    Skin: Negative.    Allergic/Immunologic: Negative.    Neurological: Negative.    Hematological: Negative.    Psychiatric/Behavioral: Negative.        Objective   Physical Exam  Vitals and nursing note reviewed.   Constitutional:       Appearance: Normal appearance. She is obese.   HENT:      Head: Normocephalic and atraumatic.      Right Ear: Tympanic membrane normal.      Left Ear: Tympanic membrane normal.      Nose: Nose normal.      Mouth/Throat:      Mouth: Mucous membranes are moist.   Eyes:      Extraocular Movements: Extraocular movements intact.      Pupils: Pupils are equal, round, and reactive to light.   Cardiovascular:      Rate and Rhythm: Normal rate and regular rhythm.      Pulses: Normal pulses.      Heart sounds: Normal heart sounds.   Pulmonary:      Effort: Pulmonary effort is normal.      Breath sounds: Normal breath sounds.   Abdominal:      General: Abdomen is flat.      Palpations: Abdomen is soft.   Genitourinary:     General: Normal vulva.      Rectum: Normal.   Musculoskeletal:         General: Normal range of motion.      Cervical back: Normal range of motion.   Skin:     General: Skin is warm and dry.   Neurological:      General: No focal deficit present.      Mental Status: She is alert and oriented to person, place, and time.   Psychiatric:         Mood and Affect: Mood normal.         Behavior: Behavior normal.         Thought Content: Thought content normal.         Judgment: Judgment normal.          /78   Pulse 73   Ht 168 cm (66.14\")   Wt 66.7 kg (147 lb)   BMI 23.62 kg/m²     Assessment & Plan "   Diagnoses and all orders for this visit:    Vagina itching  -     POC Urinalysis Dipstick, Automated  -     NuSwab Vaginitis (VG) - , Vagina    Rectal itching    Polycythemia vera (HCC)      Patient w/ vaginal itching. She had apparent yeast vaginitis. She has plycythemia vera and this could be contributory. She will cotnineu appropriate appropriate phlebotomy. She has completed diflucan. Will utilize topical barrier w/ aquaphore or AD ointment bid to both regions. Sensitive skin wipes. Avoid aggressive wiping. She will start fiber and water to have more consistent bm. F/u prn.

## 2022-09-22 ENCOUNTER — TELEPHONE (OUTPATIENT)
Dept: INTERNAL MEDICINE | Facility: CLINIC | Age: 67
End: 2022-09-22

## 2022-09-22 LAB
A VAGINAE DNA VAG QL NAA+PROBE: NORMAL SCORE
BVAB2 DNA VAG QL NAA+PROBE: NORMAL SCORE
C ALBICANS DNA VAG QL NAA+PROBE: NEGATIVE
C GLABRATA DNA VAG QL NAA+PROBE: NEGATIVE
MEGA1 DNA VAG QL NAA+PROBE: NORMAL SCORE
T VAGINALIS DNA VAG QL NAA+PROBE: NEGATIVE

## 2022-09-22 NOTE — TELEPHONE ENCOUNTER
Pt informed    ----- Message from Viri Caldera MD sent at 9/21/2022  4:22 PM EDT -----  Normal urine  jw

## 2022-11-01 ENCOUNTER — TELEPHONE (OUTPATIENT)
Dept: ONCOLOGY | Facility: CLINIC | Age: 67
End: 2022-11-01

## 2022-11-01 NOTE — TELEPHONE ENCOUNTER
Caller: Fred Farr    Relationship to patient: Self    Best call back number: [phone]    Chief complaint: PATIENT CALLED TO RESCHEDULE     Type of visit: LAB, FOLLOW UP, INFUSION    Requested date: 11-18-22 START AT 7:30 OR 8:00     If rescheduling, when is the original appointment: 11-18-22     Additional notes:PLEASE CALL PATIENT TO CONFIRM

## 2022-11-17 NOTE — PROGRESS NOTES
"[facility] GROUP OUTPATIENT FOLLOW UP CLINIC VISIT    REASON FOR FOLLOW-UP:    JAK2 V617F mutation positive polycythemia vera  Current therapy with therapeutic phlebotomy    HISTORY OF PRESENT ILLNESS:  Fred Farr is a 67 y.o. female who returns today for follow up of the above issue.      Last phlebotomy in July.    In general she has been doing well.  She does complain of a little bit more fatigue but overall remains active.  A couple of months ago she was having some vaginal and perineal pruritus.  She had a vaginal swab that was negative.  She was treated empirically for a yeast infection.  She is now moisturizing that area and using fewer chemicals such as soaps and wipes.  Her symptoms have essentially resolved.  She denies any other pruritus for the past few months.  She is interested in taking some vitamins and supplements.      REVIEW OF SYSTEMS:  As per the HPI    PHYSICAL EXAMINATION:    Vitals:    11/18/22 0748   BP: 107/72   Pulse: 67   Resp: 18   Temp: 97.9 °F (36.6 °C)   TempSrc: Temporal   SpO2: 100%   Weight: 67.5 kg (148 lb 14.4 oz)   Height: 168 cm (66.14\")   PainSc: 0-No pain      General:  No acute distress, awake, alert and oriented  Skin:  Warm and dry, no visible rash  HEENT:  Normocephalic/atraumatic.  Wearing a face mask.  Chest:  Normal respiratory effort  Extremities:  No visible clubbing, cyanosis, or edema  Neuro/psych:  Grossly nonfocal.  Normal mood and affect.      DIAGNOSTIC DATA:  Retic With IRF & RET-He (11/18/2022 07:34)  CBC & Differential (11/18/2022 07:34)      IMAGING:  None reviewed    ASSESSMENT:  This is a 67 y.o. female with:    *Polycythemia vera, JAK2 V617F mutation positive  · Hgb normal until 11/12/2020 with hgb 16.0, hct 49.8%.  · Hct mildly elevated since 2016 at 47-48%  · She, however, states that this has been a mild and chronic issue  · She does take aspirin 81 mg daily  · She states that her Fitbit tells her that her oxygen levels dropped a little bit " at night.  She carries no formal diagnosis of sleep apnea.  No congenital heart problems.  No significant time at high altitudes.  She does not smoke.  She denies any carbon monoxide exposure.  · She was seen initially on 1/8/2021.  Hemoglobin 15.8 with hematocrit 48.3%.  Laboratory evaluation pursued.  Erythropoietin normal at 4.5.  Carbon monoxide normal at 2.0.  Ferritin normal at 99.6 with normal iron studies.  · She was referred to sleep medicine because of the erythrocytosis and because her Fitbit was telling her that her oxygen level dropped at night.  She saw Dr. Dhillon.  She was found to have mild obstructive sleep apnea by home sleep study 5/24/2021.  She has an appliance now which she is using.  This was recently replaced as her old one fractured.  · JAK2 V617F mutation positive  · Therapeutic phlebotomy initiated 3/28/2022 with a hematocrit of 48.3%.  Goal less than 42%.  · 4/29/2022: Hemoglobin improved at 15.3 with hematocrit improved to 47%.  Still above our goal of 42%.  · 5/27/2022: Hematocrit near goal at 42.7%.  We did not perform therapeutic phlebotomy.  · For a hematocrit of 43.8% she did have phlebotomy on 7/8/2022  · We have not yet initiated hydroxyurea, though this may be necessary  · 8/19/2022: Hemoglobin 13.4 and hematocrit at goal at 40.8%  · 11/18/2022: Hemoglobin higher at 14.7 with hematocrit 46.5%.  Phlebotomy required.  Other blood counts remain normal.     *Vaginal and perineal pruritus  · This has resolved at this point with using fewer chemicals and with adding moisturization.  I counseled her today that the pruritus due to polycythemia vera is usually more generalized to the skin and often occurs with hot showers or more excessive heat causing sweating.  Therefore, I am not sure that the pruritus she was experiencing is secondary to the polycythemia vera.    PLAN:  1. Therapeutic phlebotomy is necessary today with a hematocrit of 46.5%  2. She will continue continue  aspirin  3. Continue local therapy for vaginal and perineal pruritus  4. I do not think any medication such as hydroxyurea for polycythemia vera is necessary at this time.  5. Follow-up in about 2 months with a CBC and reticulocyte count with therapeutic phlebotomy if indicated for a hematocrit of greater than 42%.

## 2022-11-18 ENCOUNTER — OFFICE VISIT (OUTPATIENT)
Dept: ONCOLOGY | Facility: CLINIC | Age: 67
End: 2022-11-18

## 2022-11-18 ENCOUNTER — INFUSION (OUTPATIENT)
Dept: ONCOLOGY | Facility: HOSPITAL | Age: 67
End: 2022-11-18

## 2022-11-18 ENCOUNTER — LAB (OUTPATIENT)
Dept: OTHER | Facility: HOSPITAL | Age: 67
End: 2022-11-18

## 2022-11-18 VITALS
TEMPERATURE: 97.9 F | WEIGHT: 148.9 LBS | HEIGHT: 66 IN | DIASTOLIC BLOOD PRESSURE: 72 MMHG | BODY MASS INDEX: 23.93 KG/M2 | SYSTOLIC BLOOD PRESSURE: 107 MMHG | HEART RATE: 67 BPM | OXYGEN SATURATION: 100 % | RESPIRATION RATE: 18 BRPM

## 2022-11-18 VITALS — SYSTOLIC BLOOD PRESSURE: 102 MMHG | HEART RATE: 62 BPM | DIASTOLIC BLOOD PRESSURE: 69 MMHG

## 2022-11-18 DIAGNOSIS — D45 POLYCYTHEMIA VERA: ICD-10-CM

## 2022-11-18 DIAGNOSIS — D75.1 ERYTHROCYTOSIS: ICD-10-CM

## 2022-11-18 DIAGNOSIS — D75.1 ERYTHROCYTOSIS: Primary | ICD-10-CM

## 2022-11-18 DIAGNOSIS — Z15.89 JAK-2 GENE MUTATION: Primary | ICD-10-CM

## 2022-11-18 LAB
BASOPHILS # BLD AUTO: 0.1 10*3/MM3 (ref 0–0.2)
BASOPHILS NFR BLD AUTO: 1.2 % (ref 0–1.5)
DEPRECATED RDW RBC AUTO: 52.7 FL (ref 37–54)
EOSINOPHIL # BLD AUTO: 0.2 10*3/MM3 (ref 0–0.4)
EOSINOPHIL NFR BLD AUTO: 2.5 % (ref 0.3–6.2)
ERYTHROCYTE [DISTWIDTH] IN BLOOD BY AUTOMATED COUNT: 18.9 % (ref 12.3–15.4)
HCT VFR BLD AUTO: 46.5 % (ref 34–46.6)
HGB BLD-MCNC: 14.7 G/DL (ref 12–15.9)
HGB RETIC QN AUTO: 27.2 PG (ref 29.8–36.1)
IMM GRANULOCYTES # BLD AUTO: 0.04 10*3/MM3 (ref 0–0.05)
IMM GRANULOCYTES NFR BLD AUTO: 0.5 % (ref 0–0.5)
IMM RETICS NFR: 6.2 % (ref 3–15.8)
LYMPHOCYTES # BLD AUTO: 2.79 10*3/MM3 (ref 0.7–3.1)
LYMPHOCYTES NFR BLD AUTO: 34.6 % (ref 19.6–45.3)
MCH RBC QN AUTO: 25.3 PG (ref 26.6–33)
MCHC RBC AUTO-ENTMCNC: 31.6 G/DL (ref 31.5–35.7)
MCV RBC AUTO: 80 FL (ref 79–97)
MONOCYTES # BLD AUTO: 0.72 10*3/MM3 (ref 0.1–0.9)
MONOCYTES NFR BLD AUTO: 8.9 % (ref 5–12)
NEUTROPHILS NFR BLD AUTO: 4.21 10*3/MM3 (ref 1.7–7)
NEUTROPHILS NFR BLD AUTO: 52.3 % (ref 42.7–76)
NRBC BLD AUTO-RTO: 0 /100 WBC (ref 0–0.2)
PLATELET # BLD AUTO: 365 10*3/MM3 (ref 140–450)
PMV BLD AUTO: 11 FL (ref 6–12)
RBC # BLD AUTO: 5.81 10*6/MM3 (ref 3.77–5.28)
RETICS # AUTO: 0.05 10*6/MM3 (ref 0.02–0.13)
RETICS/RBC NFR AUTO: 0.8 % (ref 0.7–1.9)
WBC NRBC COR # BLD: 8.06 10*3/MM3 (ref 3.4–10.8)

## 2022-11-18 PROCEDURE — 99195 PHLEBOTOMY: CPT

## 2022-11-18 PROCEDURE — 99214 OFFICE O/P EST MOD 30 MIN: CPT | Performed by: INTERNAL MEDICINE

## 2022-11-18 PROCEDURE — 85025 COMPLETE CBC W/AUTO DIFF WBC: CPT | Performed by: INTERNAL MEDICINE

## 2022-11-18 PROCEDURE — 36415 COLL VENOUS BLD VENIPUNCTURE: CPT

## 2022-11-18 PROCEDURE — 85046 RETICYTE/HGB CONCENTRATE: CPT | Performed by: INTERNAL MEDICINE

## 2022-11-18 PROCEDURE — 96360 HYDRATION IV INFUSION INIT: CPT

## 2022-11-18 RX ORDER — SODIUM CHLORIDE 9 MG/ML
250 INJECTION, SOLUTION INTRAVENOUS ONCE
Status: COMPLETED | OUTPATIENT
Start: 2022-11-18 | End: 2022-11-18

## 2022-11-18 RX ORDER — SODIUM CHLORIDE 9 MG/ML
250 INJECTION, SOLUTION INTRAVENOUS ONCE
OUTPATIENT
Start: 2022-11-18

## 2022-11-18 RX ORDER — FLUCONAZOLE 150 MG/1
TABLET ORAL
COMMUNITY
Start: 2022-09-16 | End: 2022-12-12

## 2022-11-18 RX ADMIN — SODIUM CHLORIDE 250 ML: 9 INJECTION, SOLUTION INTRAVENOUS at 09:30

## 2022-11-18 RX ADMIN — SODIUM CHLORIDE 250 ML: 9 INJECTION, SOLUTION INTRAVENOUS at 08:50

## 2022-11-18 RX ADMIN — SODIUM CHLORIDE 250 ML: 9 INJECTION, SOLUTION INTRAVENOUS at 09:06

## 2022-11-18 NOTE — NURSING NOTE
"Pt arrived for phlebotomy, 500ml removed. Afterwards patient stated she felt lightheaded, BP at 0852 was 83/52 HR 44, 0855 BP 71/47 HR 47. Pt was laid back with feet elevated and bolus of 250ml NS was started. No syncopal event occurred, pt remained alert.    At 0906 pt repositioned to seated position, BP was 91/66 but still c/o feeling \"off\" so another bolus of 250ml NS was started. She was alternating between drinking water and orange juice.    At 0922 BP was 101/68, pt stated she felt better. At 0930 she slowly stood up and attempted to ambulate, but soon after felt lightheaded and was placed back in lying position with her feet elevated. Her BP 88/57 and a third bolus 250ml NS was started.     Pt received a total of 750ml NS. 0955 pt stated she felt fine and was repositioned to a sitting position. She then felt lightheaded and was laid back down BP 84/61. She was given another bottle of orange juice and after lying for a few minutes stated she felt \"back to normal.\" Pt was again slowly repositioned until ready to stand and was able to ambulate without any issues. BP was 102/69 HR 62.    Pt was discharged with her  in stable condition. I instructed her to call the office for any questions or concerns and to stay well-hydrated and rest. Pt also knows to stay well-hydrated before her next scheduled phlebotomy.       "

## 2022-12-07 DIAGNOSIS — D45 POLYCYTHEMIA VERA: ICD-10-CM

## 2022-12-07 DIAGNOSIS — D75.1 ERYTHROCYTOSIS: Primary | ICD-10-CM

## 2022-12-08 LAB
ALBUMIN SERPL-MCNC: 4.5 G/DL (ref 3.5–5.2)
ALBUMIN/GLOB SERPL: 2.3 G/DL
ALP SERPL-CCNC: 81 U/L (ref 39–117)
ALT SERPL-CCNC: 21 U/L (ref 1–33)
APPEARANCE UR: CLEAR
AST SERPL-CCNC: 25 U/L (ref 1–32)
BACTERIA #/AREA URNS HPF: NORMAL /HPF
BASOPHILS # BLD AUTO: 0.07 10*3/MM3 (ref 0–0.2)
BASOPHILS NFR BLD AUTO: 1.1 % (ref 0–1.5)
BILIRUB SERPL-MCNC: 0.2 MG/DL (ref 0–1.2)
BILIRUB UR QL STRIP: NEGATIVE
BUN SERPL-MCNC: 21 MG/DL (ref 8–23)
BUN/CREAT SERPL: 22.3 (ref 7–25)
CALCIUM SERPL-MCNC: 9.8 MG/DL (ref 8.6–10.5)
CASTS URNS QL MICRO: NORMAL /LPF
CHLORIDE SERPL-SCNC: 106 MMOL/L (ref 98–107)
CO2 SERPL-SCNC: 26 MMOL/L (ref 22–29)
COLOR UR: YELLOW
CREAT SERPL-MCNC: 0.94 MG/DL (ref 0.57–1)
EGFRCR SERPLBLD CKD-EPI 2021: 65.8 ML/MIN/1.73
EOSINOPHIL # BLD AUTO: 0.16 10*3/MM3 (ref 0–0.4)
EOSINOPHIL NFR BLD AUTO: 2.6 % (ref 0.3–6.2)
EPI CELLS #/AREA URNS HPF: NORMAL /HPF (ref 0–10)
ERYTHROCYTE [DISTWIDTH] IN BLOOD BY AUTOMATED COUNT: 15.1 % (ref 12.3–15.4)
GLOBULIN SER CALC-MCNC: 2 GM/DL
GLUCOSE SERPL-MCNC: 94 MG/DL (ref 65–99)
GLUCOSE UR QL STRIP: NEGATIVE
HCT VFR BLD AUTO: 41.4 % (ref 34–46.6)
HGB BLD-MCNC: 12.9 G/DL (ref 12–15.9)
HGB UR QL STRIP: NEGATIVE
IMM GRANULOCYTES # BLD AUTO: 0.01 10*3/MM3 (ref 0–0.05)
IMM GRANULOCYTES NFR BLD AUTO: 0.2 % (ref 0–0.5)
KETONES UR QL STRIP: NEGATIVE
LEUKOCYTE ESTERASE UR QL STRIP: NEGATIVE
LYMPHOCYTES # BLD AUTO: 2.37 10*3/MM3 (ref 0.7–3.1)
LYMPHOCYTES NFR BLD AUTO: 38.9 % (ref 19.6–45.3)
MCH RBC QN AUTO: 25.4 PG (ref 26.6–33)
MCHC RBC AUTO-ENTMCNC: 31.2 G/DL (ref 31.5–35.7)
MCV RBC AUTO: 81.5 FL (ref 79–97)
MICRO URNS: NORMAL
MICRO URNS: NORMAL
MONOCYTES # BLD AUTO: 0.65 10*3/MM3 (ref 0.1–0.9)
MONOCYTES NFR BLD AUTO: 10.7 % (ref 5–12)
MUCOUS THREADS URNS QL MICRO: PRESENT /HPF
NEUTROPHILS # BLD AUTO: 2.84 10*3/MM3 (ref 1.7–7)
NEUTROPHILS NFR BLD AUTO: 46.5 % (ref 42.7–76)
NITRITE UR QL STRIP: NEGATIVE
NRBC BLD AUTO-RTO: 0 /100 WBC (ref 0–0.2)
PH UR STRIP: 5.5 [PH] (ref 5–7.5)
PLATELET # BLD AUTO: 368 10*3/MM3 (ref 140–450)
POTASSIUM SERPL-SCNC: 4.8 MMOL/L (ref 3.5–5.2)
PROT SERPL-MCNC: 6.5 G/DL (ref 6–8.5)
PROT UR QL STRIP: NEGATIVE
RBC # BLD AUTO: 5.08 10*6/MM3 (ref 3.77–5.28)
RBC #/AREA URNS HPF: NORMAL /HPF (ref 0–2)
SODIUM SERPL-SCNC: 141 MMOL/L (ref 136–145)
SP GR UR STRIP: 1.02 (ref 1–1.03)
URINALYSIS REFLEX: NORMAL
UROBILINOGEN UR STRIP-MCNC: 0.2 MG/DL (ref 0.2–1)
WBC # BLD AUTO: 6.1 10*3/MM3 (ref 3.4–10.8)
WBC #/AREA URNS HPF: NORMAL /HPF (ref 0–5)

## 2022-12-12 ENCOUNTER — OFFICE VISIT (OUTPATIENT)
Dept: INTERNAL MEDICINE | Facility: CLINIC | Age: 67
End: 2022-12-12

## 2022-12-12 VITALS
HEIGHT: 67 IN | HEART RATE: 74 BPM | DIASTOLIC BLOOD PRESSURE: 64 MMHG | SYSTOLIC BLOOD PRESSURE: 112 MMHG | WEIGHT: 148 LBS | BODY MASS INDEX: 23.23 KG/M2

## 2022-12-12 DIAGNOSIS — D75.1 POLYCYTHEMIA: ICD-10-CM

## 2022-12-12 DIAGNOSIS — E78.00 ELEVATED LDL CHOLESTEROL LEVEL: ICD-10-CM

## 2022-12-12 DIAGNOSIS — R26.89 BALANCE PROBLEM: ICD-10-CM

## 2022-12-12 DIAGNOSIS — F95.9 FACIAL TIC: ICD-10-CM

## 2022-12-12 DIAGNOSIS — R25.1 TREMOR: ICD-10-CM

## 2022-12-12 DIAGNOSIS — Z80.0 FAMILY HISTORY OF COLON CANCER IN FATHER: ICD-10-CM

## 2022-12-12 DIAGNOSIS — Z13.21 ENCOUNTER FOR VITAMIN DEFICIENCY SCREENING: ICD-10-CM

## 2022-12-12 DIAGNOSIS — R41.3 MEMORY CHANGES: ICD-10-CM

## 2022-12-12 DIAGNOSIS — Z12.11 COLON CANCER SCREENING: Primary | ICD-10-CM

## 2022-12-12 DIAGNOSIS — R53.83 OTHER FATIGUE: ICD-10-CM

## 2022-12-12 DIAGNOSIS — E55.9 VITAMIN D DEFICIENCY: ICD-10-CM

## 2022-12-12 PROCEDURE — 90662 IIV NO PRSV INCREASED AG IM: CPT | Performed by: INTERNAL MEDICINE

## 2022-12-12 PROCEDURE — G0439 PPPS, SUBSEQ VISIT: HCPCS | Performed by: INTERNAL MEDICINE

## 2022-12-12 PROCEDURE — 1170F FXNL STATUS ASSESSED: CPT | Performed by: INTERNAL MEDICINE

## 2022-12-12 PROCEDURE — 99214 OFFICE O/P EST MOD 30 MIN: CPT | Performed by: INTERNAL MEDICINE

## 2022-12-12 PROCEDURE — 1125F AMNT PAIN NOTED PAIN PRSNT: CPT | Performed by: INTERNAL MEDICINE

## 2022-12-12 PROCEDURE — 1160F RVW MEDS BY RX/DR IN RCRD: CPT | Performed by: INTERNAL MEDICINE

## 2022-12-12 PROCEDURE — G0008 ADMIN INFLUENZA VIRUS VAC: HCPCS | Performed by: INTERNAL MEDICINE

## 2022-12-12 NOTE — PROGRESS NOTES
The ABCs of the Annual Wellness Visit  Subsequent Medicare Wellness Visit    Subjective    Fred Farr is a 67 y.o. female who presents for a Subsequent Medicare Wellness Visit, to review chronic issues, and to discuss acute needs.     The following portions of the patient's history were reviewed and   updated as appropriate: allergies, current medications, past family history, past medical history, past social history, past surgical history and problem list.    Compared to one year ago, the patient feels her physical   health is the same.    Compared to one year ago, the patient feels her mental   health is the same.    Recent Hospitalizations:  She was not admitted to the hospital during the last year.       Current Medical Providers:  Patient Care Team:  Viri Caldera MD as PCP - General (Internal Medicine)  Lenard Pedraza MD as Consulting Physician (Hematology and Oncology)  Viri Caldera MD as Referring Physician (Internal Medicine)  Viri Caldera MD as Consulting Physician (Internal Medicine)    Outpatient Medications Prior to Visit   Medication Sig Dispense Refill   • acyclovir (ZOVIRAX) 5 % ointment Apply to affected area tid prn 15 g 0   • albuterol sulfate  (90 Base) MCG/ACT inhaler Inhale 2 puffs Every 4 (Four) Hours As Needed for Wheezing. 18 g 0   • aspirin 81 MG EC tablet Take 81 mg by mouth Daily. 2 pills     • buPROPion XL (WELLBUTRIN XL) 150 MG 24 hr tablet TAKE 1 TABLET BY MOUTH IN  THE MORNING 90 tablet 3   • flunisolide (NASALIDE) 25 MCG/ACT (0.025%) solution nasal spray 2 sprays 2 (Two) Times a Day.     • montelukast (SINGULAIR) 10 MG tablet TAKE 1 TABLET BY MOUTH AT  NIGHT 90 tablet 3   • valACYclovir (Valtrex) 1000 MG tablet Take 2 tablets by mouth 2 (Two) Times a Day. 4 tablet 5   • ipratropium (ATROVENT) 0.06 % nasal spray USE 2 SPRAYS IN EACH NOSTRIL EVERY 6 HOURS AS NEEDED     • fluconazole (DIFLUCAN) 150 MG tablet TAKE 1 TABLET BY MOUTH 1 TIME FOR 1 DOSE. MAY REPEAT IN 72  "HOURS AS NEEDED       No facility-administered medications prior to visit.       No opioid medication identified on active medication list. I have reviewed chart for other potential  high risk medication/s and harmful drug interactions in the elderly.          Aspirin is on active medication list. Aspirin use is indicated based on review of current medical condition/s. Pros and cons of this therapy have been discussed today. Benefits of this medication outweigh potential harm.  Patient has been encouraged to continue taking this medication.  .Patient has polycythemia      Patient Active Problem List   Diagnosis   • Allergic rhinitis   • Hearing impairment   • Recurrent cold sores   • Encounter for screening for malignant neoplasm of colon   • Erythrocytosis   • FERDINAND (obstructive sleep apnea)   • EDI-2 gene mutation   • Polycythemia vera (HCC)     Advance Care Planning  Advance Directive is not on file.  ACP discussion was held with the patient during this visit. Patient has an advance directive (not in EMR), copy requested.     Objective    Vitals:    12/12/22 0729   BP: 112/64   Pulse: 74   Weight: 67.1 kg (148 lb)   Height: 168.9 cm (66.5\")   PainSc: 0-No pain     Estimated body mass index is 23.53 kg/m² as calculated from the following:    Height as of this encounter: 168.9 cm (66.5\").    Weight as of this encounter: 67.1 kg (148 lb).    BMI is within normal parameters. No other follow-up for BMI required.      Does the patient have evidence of cognitive impairment? No          HEALTH RISK ASSESSMENT    Smoking Status:  Social History     Tobacco Use   Smoking Status Never   Smokeless Tobacco Never     Alcohol Consumption:  Social History     Substance and Sexual Activity   Alcohol Use Yes    Comment: Almost none, 6 times per year     Fall Risk Screen:    STEADI Fall Risk Assessment was completed, and patient is at LOW risk for falls.Assessment completed on:12/12/2022    Depression Screening:  PHQ-2/PHQ-9 " Depression Screening 12/12/2022   Retired PHQ-9 Total Score -   Retired Total Score -   Little Interest or Pleasure in Doing Things 0-->not at all   Feeling Down, Depressed or Hopeless 0-->not at all   Trouble Falling or Staying Asleep, or Sleeping Too Much -   Feeling Tired or Having Little Energy -   Poor Appetite or Overeating -   Feeling Bad about Yourself - or that You are a Failure or Have Let Yourself or Your Family Down -   Trouble Concentrating on Things, Such as Reading the Newspaper or Watching Television -   Moving or Speaking So Slowly that Other People Could Have Noticed? Or the Opposite - Being So Fidgety -   Thoughts that You Would be Better Off Dead or of Hurting Yourself in Some Way -   PHQ-9: Brief Depression Severity Measure Score 0   If You Checked Off Any Problems, How Difficult Have These Problems Made It For You to Do Your Work, Take Care of Things at Home, or Get Along with Other People? -       Health Habits and Functional and Cognitive Screening:  Functional & Cognitive Status 12/12/2022   Do you have difficulty preparing food and eating? No   Do you have difficulty bathing yourself, getting dressed or grooming yourself? No   Do you have difficulty using the toilet? No   Do you have difficulty moving around from place to place? No   Do you have trouble with steps or getting out of a bed or a chair? No   Current Diet Well Balanced Diet   Dental Exam Up to date   Eye Exam Up to date   Exercise (times per week) -   Current Exercise Activities Include -   Do you need help using the phone?  No   Are you deaf or do you have serious difficulty hearing?  No   Do you need help with transportation? No   Do you need help shopping? No   Do you need help preparing meals?  No   Do you need help with housework?  No   Do you need help with laundry? No   Do you need help taking your medications? No   Do you need help managing money? No   Do you ever drive or ride in a car without wearing a seat belt? No    Have you felt unusual stress, anger or loneliness in the last month? No   Who do you live with? Spouse   If you need help, do you have trouble finding someone available to you? No   Have you been bothered in the last four weeks by sexual problems? No   Do you have difficulty concentrating, remembering or making decisions? No       Age-appropriate Screening Schedule:  Refer to the list below for future screening recommendations based on patient's age, sex and/or medical conditions. Orders for these recommended tests are listed in the plan section. The patient has been provided with a written plan.    Health Maintenance   Topic Date Due   • LIPID PANEL  05/17/2023   • MAMMOGRAM  02/17/2024   • DXA SCAN  06/28/2024   • PAP SMEAR  11/19/2024   • TDAP/TD VACCINES (2 - Td or Tdap) 10/20/2027   • INFLUENZA VACCINE  Completed   • ZOSTER VACCINE  Completed                CMS Preventative Services Quick Reference  Risk Factors Identified During Encounter  None Identified   Discussed all routine hcm. She is given fall precautions.   The above risks/problems have been discussed with the patient.  Pertinent information has been shared with the patient in the After Visit Summary.  An After Visit Summary and PPPS were made available to the patient.    Follow Up:   Next Medicare Wellness visit to be scheduled in 1 year.       Additional E&M Note during same encounter follows:  Patient has multiple medical problems which are significant and separately identifiable that require additional work above and beyond the Medicare Wellness Visit.      Chief Complaint  Annual Exam    Subjective        HPI  Fred Farr is also being seen today for spousal concerns of head nodding and facial tic. Spouse is with patient today. He notes if she is sitting at rest she has a head nod that is not always perceptable to the patient. She also will be looking at her phone and have a facial tic of the mouth w/ her mouth in  Smiling sort of fashion.  "Patient has felt that her balance has been impaired. She had a fall \"prepandemic about 3 years ago\". She notes \"I feel older than I ever did\". Her father passed from supranuclear palsy.   She has polycythemia w erythrocytosis. EDI-2 mutation. Requires phlebotomy for this. She often gets hypotensive after phlebotomy requiring fluids.   She notesd some fatigue in the summer. Some improvement at this time.   She is scheduled for ct cardiac calcium and vascular screening.          Objective   Vital Signs:  /64   Pulse 74   Ht 168.9 cm (66.5\")   Wt 67.1 kg (148 lb)   BMI 23.53 kg/m²     Physical Exam  Vitals and nursing note reviewed.   Constitutional:       Appearance: Normal appearance. She is well-developed.   HENT:      Head: Normocephalic and atraumatic.      Right Ear: Tympanic membrane and external ear normal.      Left Ear: Tympanic membrane and external ear normal.      Nose: Nose normal.      Mouth/Throat:      Mouth: Mucous membranes are moist.   Eyes:      Extraocular Movements: Extraocular movements intact.      Pupils: Pupils are equal, round, and reactive to light.   Cardiovascular:      Rate and Rhythm: Normal rate and regular rhythm.      Pulses: Normal pulses.      Heart sounds: Normal heart sounds.   Pulmonary:      Effort: Pulmonary effort is normal. No respiratory distress.      Breath sounds: Normal breath sounds.   Abdominal:      General: Abdomen is flat.      Palpations: Abdomen is soft.   Musculoskeletal:         General: Normal range of motion.      Cervical back: Normal range of motion and neck supple.   Skin:     General: Skin is warm and dry.   Neurological:      General: No focal deficit present.      Mental Status: She is alert and oriented to person, place, and time.      Sensory: Sensory deficit present.   Psychiatric:         Mood and Affect: Mood normal.         Behavior: Behavior normal.         Thought Content: Thought content normal.         Judgment: Judgment normal.      "     The following data was reviewed by: Viri Caldera MD on 12/12/2022:  Common labs    Common Labs 8/19/22 11/18/22 12/7/22 12/7/22      0809 0809   Glucose    94   BUN    21   Creatinine    0.94   Sodium    141   Potassium    4.8   Chloride    106   Calcium    9.8   Total Protein    6.5   Albumin    4.50   Total Bilirubin    0.2   Alkaline Phosphatase    81   AST (SGOT)    25   ALT (SGPT)    21   WBC 7.15 8.06 6.10    Hemoglobin 13.4 14.7 12.9    Hematocrit 40.8 46.5 41.4    Platelets 317 365 368            CMP    CMP 5/17/22 12/7/22   Glucose 84 94   BUN 18 21   Creatinine 1.05 (A) 0.94   Sodium 138 141   Potassium 5.0 4.8   Chloride 102 106   Calcium 9.5 9.8   Total Protein 6.9 6.5   Albumin 4.4 4.50   Globulin 2.5 2.0   Total Bilirubin 0.3 0.2   Alkaline Phosphatase 90 81   AST (SGOT) 32 25   ALT (SGPT) 23 21   (A) Abnormal value            CBC    CBC 8/19/22 11/18/22 12/7/22   WBC 7.15 8.06 6.10   RBC 5.01 5.81 (A) 5.08   Hemoglobin 13.4 14.7 12.9   Hematocrit 40.8 46.5 41.4   MCV 81.4 80.0 81.5   MCH 26.7 25.3 (A) 25.4 (A)   MCHC 32.8 31.6 31.2 (A)   RDW 16.3 (A) 18.9 (A) 15.1   Platelets 317 365 368   (A) Abnormal value                   Assessment and Plan   Diagnoses and all orders for this visit:    1. Colon cancer screening (Primary)  -     Ambulatory Referral For Screening Colonoscopy    2. Family history of colon cancer in father  -     Ambulatory Referral For Screening Colonoscopy    3. Encounter for vitamin deficiency screening  -     Vitamin B12  -     Folate  -     Vitamin D,25-Hydroxy    4. Balance problem  -     TSH  -     Vitamin B12  -     Folate  -     Vitamin D,25-Hydroxy  -     MRI Brain With & Without Contrast; Future  -     Ambulatory Referral to Neurology    5. Elevated LDL cholesterol level  -     Lipid Panel With LDL / HDL Ratio    6. Polycythemia  -     TSH  -     Lipid Panel With LDL / HDL Ratio  -     Vitamin B12  -     Folate  -     Vitamin D,25-Hydroxy    7. Vitamin D  deficiency  -     TSH  -     Vitamin D,25-Hydroxy    8. Other fatigue  -     TSH    9. Memory changes  -     MRI Brain With & Without Contrast; Future  -     Ambulatory Referral to Neurology    10. Facial tic  -     Ambulatory Referral to Neurology    11. Tremor  -     MRI Brain With & Without Contrast; Future  -     Ambulatory Referral to Neurology    Other orders  -     Fluzone High-Dose 65+yrs ([id number])         patient will continue routine phlebotomy as needed for polycythemia. Patient and spouse w/ concerns of facial tic and head tremor. She will have mri brain given fam h/o of brother w/ neurological d/o. To neuroogy. Will test tsh, vit d, b12, and folate along w/ other listed labs. She will be referred to physical therapy if imbalance is more of a deconditioning. Encouraged core strengthening. She will f/u here in 3 mo or prn.     I spent 55 minutes caring for Fred on this date of service. This time includes time spent by me in the following activities:preparing for the visit, reviewing tests, obtaining and/or reviewing a separately obtained history, performing a medically appropriate examination and/or evaluation , counseling and educating the patient/family/caregiver, ordering medications, tests, or procedures, referring and communicating with other health care professionals , documenting information in the medical record and independently interpreting results and communicating that information with the patient/family/caregiver    Follow Up Return in about 4 months (around 4/12/2023) for Recheck.  Patient was given instructions and counseling regarding her condition or for health maintenance advice. Please see specific information pulled into the AVS if appropriate.

## 2022-12-13 ENCOUNTER — TELEPHONE (OUTPATIENT)
Dept: INTERNAL MEDICINE | Facility: CLINIC | Age: 67
End: 2022-12-13

## 2022-12-13 LAB
25(OH)D3+25(OH)D2 SERPL-MCNC: 56.6 NG/ML (ref 30–100)
CHOLEST SERPL-MCNC: 181 MG/DL (ref 100–199)
FOLATE SERPL-MCNC: >20 NG/ML
HDLC SERPL-MCNC: 69 MG/DL
LDLC SERPL CALC-MCNC: 99 MG/DL (ref 0–99)
LDLC/HDLC SERPL: 1.4 RATIO (ref 0–3.2)
TRIGL SERPL-MCNC: 69 MG/DL (ref 0–149)
TSH SERPL DL<=0.005 MIU/L-ACNC: 1.97 UIU/ML (ref 0.45–4.5)
VIT B12 SERPL-MCNC: 984 PG/ML (ref 232–1245)
VLDLC SERPL CALC-MCNC: 13 MG/DL (ref 5–40)

## 2022-12-13 NOTE — TELEPHONE ENCOUNTER
Pt informed      ----- Message from Viri Caldera MD sent at 12/13/2022  8:11 AM EST -----  Normal tsh, lipid, b12, folate, vit D  jw

## 2023-01-03 ENCOUNTER — HOSPITAL ENCOUNTER (OUTPATIENT)
Dept: MRI IMAGING | Facility: HOSPITAL | Age: 68
End: 2023-01-03
Payer: MEDICARE

## 2023-01-05 ENCOUNTER — APPOINTMENT (OUTPATIENT)
Dept: CT IMAGING | Facility: HOSPITAL | Age: 68
End: 2023-01-05

## 2023-01-05 ENCOUNTER — APPOINTMENT (OUTPATIENT)
Dept: CARDIOLOGY | Facility: HOSPITAL | Age: 68
End: 2023-01-05

## 2023-01-06 ENCOUNTER — TELEMEDICINE (OUTPATIENT)
Dept: INTERNAL MEDICINE | Facility: CLINIC | Age: 68
End: 2023-01-06
Payer: MEDICARE

## 2023-01-06 ENCOUNTER — TELEPHONE (OUTPATIENT)
Dept: ONCOLOGY | Facility: CLINIC | Age: 68
End: 2023-01-06
Payer: MEDICARE

## 2023-01-06 DIAGNOSIS — U07.1 COVID-19 VIRUS DETECTED: Primary | ICD-10-CM

## 2023-01-06 PROCEDURE — 99213 OFFICE O/P EST LOW 20 MIN: CPT | Performed by: INTERNAL MEDICINE

## 2023-01-06 RX ORDER — GUAIFENESIN AND CODEINE PHOSPHATE 100; 10 MG/5ML; MG/5ML
5 SOLUTION ORAL 3 TIMES DAILY PRN
Qty: 125 ML | Refills: 0 | Status: SHIPPED | OUTPATIENT
Start: 2023-01-06 | End: 2023-01-09 | Stop reason: SDUPTHER

## 2023-01-06 RX ORDER — BENZONATATE 100 MG/1
100 CAPSULE ORAL 3 TIMES DAILY PRN
Qty: 30 CAPSULE | Refills: 3 | Status: SHIPPED | OUTPATIENT
Start: 2023-01-06 | End: 2023-01-16

## 2023-01-06 NOTE — PROGRESS NOTES
Chief Complaint   Patient presents with   • covid 19       History of Present Illness   Fred Farr is a 67 y.o. female presents for acute needs. Mode of Visit: Video  Location of patient: home  Location of provider: [facility] [place] [facility]  You have chosen to receive care through a telehealth visit.  Does the patient consent to use a video/audio connection their medical care today? yes  The visit included audio and video interaction. No technical issues occurred during this visit.   Patient tested positive for covid 3 days ago. Symptoms started just prior to this. She notes that she was having initial improvement and then symptoms have gotten progressive from these. She has congestion, cough, fatigue. She notes that her sputum was initially small volume and now is much larger volume. Taking mucinex. She     The following portions of the patient's history were reviewed and updated as appropriate: allergies, current medications, past family history, past medical history, past social history, past surgical history and problem list.  Current Outpatient Medications on File Prior to Visit   Medication Sig Dispense Refill   • acyclovir (ZOVIRAX) 5 % ointment Apply to affected area tid prn 15 g 0   • albuterol sulfate  (90 Base) MCG/ACT inhaler Inhale 2 puffs Every 4 (Four) Hours As Needed for Wheezing. 18 g 0   • aspirin 81 MG EC tablet Take 81 mg by mouth Daily. 2 pills     • buPROPion XL (WELLBUTRIN XL) 150 MG 24 hr tablet TAKE 1 TABLET BY MOUTH IN  THE MORNING 90 tablet 3   • flunisolide (NASALIDE) 25 MCG/ACT (0.025%) solution nasal spray 2 sprays 2 (Two) Times a Day.     • ipratropium (ATROVENT) 0.06 % nasal spray USE 2 SPRAYS IN EACH NOSTRIL EVERY 6 HOURS AS NEEDED     • montelukast (SINGULAIR) 10 MG tablet TAKE 1 TABLET BY MOUTH AT  NIGHT 90 tablet 3   • valACYclovir (Valtrex) 1000 MG tablet Take 2 tablets by mouth 2 (Two) Times a Day. 4 tablet 5     No current facility-administered medications on file prior to visit.      Review of Systems   Constitutional: Positive for fatigue.   HENT: Positive for postnasal drip, rhinorrhea, sneezing and sore throat.    Eyes: Negative.    Respiratory: Negative.    Cardiovascular: Negative.    Gastrointestinal: Negative.    Endocrine: Negative.    Genitourinary: Negative.    Musculoskeletal: Negative.    Allergic/Immunologic: Negative.    Neurological: Negative.    Hematological: Negative.    Psychiatric/Behavioral: Negative.        Objective   Physical Exam  Constitutional:       Appearance: Normal appearance.      Comments: Ill appearing   Not acutely distressed     HENT:      Head: Normocephalic and atraumatic.      Mouth/Throat:      Mouth: Mucous membranes are moist.   Pulmonary:      Effort: Pulmonary effort is normal.      Breath sounds: Normal breath sounds.   Musculoskeletal:         General: Normal range of motion.      Cervical back: Normal range of motion.   Neurological:      General: No focal deficit present.      Mental Status: She is alert and oriented to person, place, and time.   Psychiatric:         Mood and Affect: Mood normal.         Behavior: Behavior normal.         Thought Content: Thought content normal.         Judgment: Judgment normal.          There were no vitals taken for this visit.    Assessment & Plan   Diagnoses and all orders for this visit:    COVID-19 virus detected    Other orders  -     Nirmatrelvir&Ritonavir 300/100 (PAXLOVID) 20 x 150 MG & 10 x 100MG tablet therapy pack tablet; Take 3 tablets by mouth 2 (Two) Times a Day for 5 days.  -     benzonatate (Tessalon Perles) 100 MG capsule; Take 1 capsule by mouth 3 (Three) Times a Day As Needed for Cough.  -     guaiFENesin-codeine (GUAIFENESIN AC) 100-10 MG/5ML liquid; Take 5 mL by mouth 3 (Three) Times a Day As Needed for Cough or Congestion.    patient w/ acute covid 19 infection. She is to start paxlovid immediately. She is aware of the risks and benefits of this medication. egfr >65. She has age >65.  Symptoms started <5 days ago. She will cotninue mucinex but may replace w/ codeine cough med prn. To utilize albuterol tid-qid. Tessalon prn. She will hydrate well. Call if fevers or worsening of symptoms.

## 2023-01-06 NOTE — TELEPHONE ENCOUNTER
Caller: Fred Farr    Relationship to patient: Self    Best call back number: [phone]    Chief complaint: PATIENT TO RESCHEDULE 1/13/23 DUE TO POSITIVE COVID TEST ON 1/3/23    Type of visit: LAB AND FU    Requested date:THE WEEK OF 1/16/23

## 2023-01-09 RX ORDER — GUAIFENESIN AND CODEINE PHOSPHATE 100; 10 MG/5ML; MG/5ML
5 SOLUTION ORAL 3 TIMES DAILY PRN
Qty: 125 ML | Refills: 0 | Status: SHIPPED | OUTPATIENT
Start: 2023-01-09 | End: 2023-01-16

## 2023-01-16 ENCOUNTER — TELEPHONE (OUTPATIENT)
Dept: INTERNAL MEDICINE | Facility: CLINIC | Age: 68
End: 2023-01-16

## 2023-01-16 ENCOUNTER — OFFICE VISIT (OUTPATIENT)
Dept: INTERNAL MEDICINE | Facility: CLINIC | Age: 68
End: 2023-01-16
Payer: MEDICARE

## 2023-01-16 VITALS
HEART RATE: 70 BPM | HEIGHT: 67 IN | SYSTOLIC BLOOD PRESSURE: 120 MMHG | WEIGHT: 148 LBS | DIASTOLIC BLOOD PRESSURE: 70 MMHG | BODY MASS INDEX: 23.23 KG/M2

## 2023-01-16 DIAGNOSIS — U07.1 COVID-19 VIRUS DETECTED: ICD-10-CM

## 2023-01-16 DIAGNOSIS — N30.01 ACUTE CYSTITIS WITH HEMATURIA: Primary | ICD-10-CM

## 2023-01-16 DIAGNOSIS — N89.8 VAGINA ITCHING: Primary | ICD-10-CM

## 2023-01-16 LAB
BILIRUB BLD-MCNC: NEGATIVE MG/DL
CLARITY, POC: ABNORMAL
COLOR UR: ABNORMAL
EXPIRATION DATE: ABNORMAL
GLUCOSE UR STRIP-MCNC: NEGATIVE MG/DL
KETONES UR QL: NEGATIVE
LEUKOCYTE EST, POC: ABNORMAL
Lab: ABNORMAL
NITRITE UR-MCNC: POSITIVE MG/ML
PH UR: 5.5 [PH] (ref 5–8)
PROT UR STRIP-MCNC: ABNORMAL MG/DL
RBC # UR STRIP: ABNORMAL /UL
SP GR UR: 1.03 (ref 1–1.03)
UROBILINOGEN UR QL: ABNORMAL

## 2023-01-16 PROCEDURE — 81003 URINALYSIS AUTO W/O SCOPE: CPT | Performed by: INTERNAL MEDICINE

## 2023-01-16 PROCEDURE — 99213 OFFICE O/P EST LOW 20 MIN: CPT | Performed by: INTERNAL MEDICINE

## 2023-01-16 RX ORDER — PHENAZOPYRIDINE HYDROCHLORIDE 200 MG/1
200 TABLET, FILM COATED ORAL 3 TIMES DAILY PRN
Qty: 15 TABLET | Refills: 0 | Status: SHIPPED | OUTPATIENT
Start: 2023-01-16

## 2023-01-16 RX ORDER — VALACYCLOVIR HYDROCHLORIDE 1 G/1
2000 TABLET, FILM COATED ORAL DAILY
Qty: 30 TABLET | Refills: 1 | Status: SHIPPED | OUTPATIENT
Start: 2023-01-16

## 2023-01-16 RX ORDER — CEPHALEXIN 500 MG/1
500 CAPSULE ORAL 2 TIMES DAILY
Qty: 14 CAPSULE | Refills: 0 | Status: SHIPPED | OUTPATIENT
Start: 2023-01-16

## 2023-01-16 NOTE — TELEPHONE ENCOUNTER
"  Caller: Fred Farr    Relationship: Self    Best call back number: [phone]*    What is the best time to reach you: ANYTIME    Who are you requesting to speak with (clinical staff, provider,  specific staff member): CLINICAL    What was the call regarding: PATIENT CALLING STATING THAT SHE IS HAVING UTI SYMPTOMS AND HAS SENT A MESSAGE TO DR. VILLAFANA, VIA [employer] EXPLAINING HER SYMPTOMS. THE PATIENT STATES THAT SHE HAS TAKEN ONE TABLE LAST NIGHT OF PHENAZOPYRIDINE 200 MG, WHICH TURNS HER BRIGHT ORANGE. THE PATIENT IS GETTING OVER COVID AND STATES THAT SHE SHOULD NOT BE CONTAGIOUS, BUT CONCERNED THAT HER IMMUNE SYSTEM IS NOT IN \"PEAK PERFORMANCE\", WOULD RATHER NOT COME INTO THE OFFICE UNLESS SHE HAS TO. THE PATIENT IS REQUESTING A CALL BACK.    PHARMACY CONFIRMED:  [id number] [place], [place] - [address] [facility] - [phone] [facility] [phone]   [phone]              "

## 2023-01-16 NOTE — PROGRESS NOTES
"Chief Complaint   Patient presents with   • Nocturia       History of Present Illness   Fred Farr is a 67 y.o. female presents for acute care. Patient has urinary frequency and urgency. She recently had covid 19. She developed symptoms of frequency and urgency yesterday afternoon \"like a ton of bricks\". Pyridium w/ some benefit. U/a w/ nitrite, protein, blood in the urine.     The following portions of the patient's history were reviewed and updated as appropriate: allergies, current medications, past family history, past medical history, past social history, past surgical history and problem list.  Current Outpatient Medications on File Prior to Visit   Medication Sig Dispense Refill   • acyclovir (ZOVIRAX) 5 % ointment Apply to affected area tid prn 15 g 0   • albuterol sulfate  (90 Base) MCG/ACT inhaler Inhale 2 puffs Every 4 (Four) Hours As Needed for Wheezing. 18 g 0   • aspirin 81 MG EC tablet Take 81 mg by mouth Daily. 2 pills     • buPROPion XL (WELLBUTRIN XL) 150 MG 24 hr tablet TAKE 1 TABLET BY MOUTH IN  THE MORNING 90 tablet 3   • flunisolide (NASALIDE) 25 MCG/ACT (0.025%) solution nasal spray 2 sprays 2 (Two) Times a Day.     • montelukast (SINGULAIR) 10 MG tablet TAKE 1 TABLET BY MOUTH AT  NIGHT 90 tablet 3   • [DISCONTINUED] valACYclovir (Valtrex) 1000 MG tablet Take 2 tablets by mouth 2 (Two) Times a Day. 4 tablet 5   • ipratropium (ATROVENT) 0.06 % nasal spray USE 2 SPRAYS IN EACH NOSTRIL EVERY 6 HOURS AS NEEDED     • [DISCONTINUED] benzonatate (Tessalon Perles) 100 MG capsule Take 1 capsule by mouth 3 (Three) Times a Day As Needed for Cough. 30 capsule 3   • [DISCONTINUED] guaiFENesin-codeine (GUAIFENESIN AC) 100-10 MG/5ML liquid Take 5 mL by mouth 3 (Three) Times a Day As Needed for Cough or Congestion. 125 mL 0     No current facility-administered medications on file prior to visit.     Review of Systems   Constitutional: Positive for fatigue.   HENT: Negative.    Eyes: Negative.  " "  Respiratory: Negative.    Cardiovascular: Negative.    Gastrointestinal: Negative.    Endocrine: Negative.    Genitourinary: Positive for dysuria, frequency and hematuria.   Musculoskeletal: Negative.    Allergic/Immunologic: Negative.    Neurological: Negative.    Hematological: Negative.    Psychiatric/Behavioral: Negative.        Objective   Physical Exam  Vitals and nursing note reviewed.   Constitutional:       Appearance: Normal appearance.   HENT:      Head: Normocephalic and atraumatic.      Right Ear: Tympanic membrane normal.      Left Ear: Tympanic membrane normal.      Mouth/Throat:      Mouth: Mucous membranes are moist.   Eyes:      Extraocular Movements: Extraocular movements intact.      Pupils: Pupils are equal, round, and reactive to light.   Cardiovascular:      Rate and Rhythm: Normal rate and regular rhythm.      Pulses: Normal pulses.      Heart sounds: Normal heart sounds.   Pulmonary:      Effort: Pulmonary effort is normal.      Breath sounds: Normal breath sounds.   Abdominal:      General: Abdomen is flat.      Palpations: Abdomen is soft.   Musculoskeletal:         General: Normal range of motion.      Cervical back: Normal range of motion.   Skin:     General: Skin is warm and dry.   Neurological:      General: No focal deficit present.      Mental Status: She is alert and oriented to person, place, and time.   Psychiatric:         Mood and Affect: Mood normal.         Behavior: Behavior normal.         Thought Content: Thought content normal.         Judgment: Judgment normal.          /70   Pulse 70   Ht 168.9 cm (66.5\")   Wt 67.1 kg (148 lb)   BMI 23.53 kg/m²     Assessment & Plan   Diagnoses and all orders for this visit:    Acute cystitis with hematuria  -     POC Urinalysis Dipstick, Automated    COVID-19 virus detected    Other orders  -     cephalexin (Keflex) 500 MG capsule; Take 1 capsule by mouth 2 (Two) Times a Day.  -     phenazopyridine (Pyridium) 200 MG tablet; " Take 1 tablet by mouth 3 (Three) Times a Day As Needed for Bladder Spasms.  -     valACYclovir (Valtrex) 1000 MG tablet; Take 2 tablets by mouth Daily.    patient with acute UTI. Will treat w/ keflex based on prior urine culture. Will send for culture. She will take pyridium prn with keflex. Advised increased hydration w/ water. May take cranberry tablets. Discussed value of vaginal hydration and she will restart replens. She has oral hsv. She is to take valtrex for this. She had covid recently. Advised vaccination in 90 days against covid 19.

## 2023-01-19 ENCOUNTER — TELEPHONE (OUTPATIENT)
Dept: ONCOLOGY | Facility: OTHER | Age: 68
End: 2023-01-19
Payer: MEDICARE

## 2023-01-20 ENCOUNTER — APPOINTMENT (OUTPATIENT)
Dept: CARDIOLOGY | Facility: HOSPITAL | Age: 68
End: 2023-01-20
Payer: MEDICARE

## 2023-01-21 LAB
BACTERIA UR CULT: ABNORMAL
BACTERIA UR CULT: ABNORMAL
OTHER ANTIBIOTIC SUSC ISLT: ABNORMAL
WRITTEN AUTHORIZATION: NORMAL

## 2023-02-01 NOTE — PROGRESS NOTES
"[facility] CBC GROUP OUTPATIENT FOLLOW UP CLINIC VISIT    REASON FOR FOLLOW-UP:    JAK2 V617F mutation positive polycythemia vera  Current therapy with therapeutic phlebotomy    HISTORY OF PRESENT ILLNESS:  Fred Farr is a 68 y.o. female who returns today for follow up of the above issue.      Last phlebotomy 11/18/22 with HCT of 46.5%    She had COVID-19 in early January.  She does note ongoing mild fatigue.    REVIEW OF SYSTEMS:  As per the HPI    PHYSICAL EXAMINATION:    Vitals:    02/03/23 0816   BP: 107/67   Pulse: 70   Resp: 18   Temp: 97.6 °F (36.4 °C)   TempSrc: Temporal   SpO2: 100%   Weight: 66.8 kg (147 lb 3.2 oz)   Height: 168.9 cm (66.5\")   PainSc: 0-No pain      General:  No acute distress, awake, alert and oriented  Skin:  Warm and dry, no visible rash  HEENT:  Normocephalic/atraumatic.  Wearing a face mask.  Chest:  Normal respiratory effort  Extremities:  No visible clubbing, cyanosis, or edema  Neuro/psych:  Grossly nonfocal.  Normal mood and affect.          DIAGNOSTIC DATA:  CBC & Differential (02/03/2023 08:13)  Retic With IRF & RET-He (02/03/2023 08:13)    IMAGING:  None reviewed    ASSESSMENT:  This is a 68 y.o. female with:    *Polycythemia vera, JAK2 V617F mutation positive  · Hgb normal until 11/12/2020 with hgb 16.0, hct 49.8%.  · Hct mildly elevated since 2016 at 47-48%  · She, however, states that this has been a mild and chronic issue  · She does take aspirin 81 mg daily  · She states that her Fitbit tells her that her oxygen levels dropped a little bit at night.  She carries no formal diagnosis of sleep apnea.  No congenital heart problems.  No significant time at high altitudes.  She does not smoke.  She denies any carbon monoxide exposure.  · She was seen initially on 1/8/2021.  Hemoglobin 15.8 with hematocrit 48.3%.  Laboratory evaluation pursued.  Erythropoietin normal at 4.5.  Carbon monoxide normal at 2.0.  Ferritin normal at 99.6 with normal iron studies.  · She was " referred to sleep medicine because of the erythrocytosis and because her Fitbit was telling her that her oxygen level dropped at night.  She saw Dr. Dhillon.  She was found to have mild obstructive sleep apnea by home sleep study 5/24/2021.  She has an appliance now which she is using.  This was recently replaced as her old one fractured.  · JAK2 V617F mutation positive  · Therapeutic phlebotomy initiated 3/28/2022 with a hematocrit of 48.3%.  Goal less than 42%.  · 4/29/2022: Hemoglobin improved at 15.3 with hematocrit improved to 47%.  Still above our goal of 42%.  · 5/27/2022: Hematocrit near goal at 42.7%.  We did not perform therapeutic phlebotomy.  · For a hematocrit of 43.8% she did have phlebotomy on 7/8/2022  · We have not yet initiated hydroxyurea, though this may be necessary  · 8/19/2022: Hemoglobin 13.4 and hematocrit at goal at 40.8%  · 11/18/2022: Hemoglobin higher at 14.7 with hematocrit 46.5%.  Phlebotomy required.  Other blood counts remain normal.  · 2/3/2023: Hematocrit 43.0%.  However, the MCV is slightly low and the reticulated hemoglobin is low and I believe she may be getting iron deficient.  Therefore, hold phlebotomy.     *Vaginal and perineal pruritus  · This has resolved at this point with using fewer chemicals and with adding moisturization.  I counseled her today that the pruritus due to polycythemia vera is usually more generalized to the skin and often occurs with hot showers or more excessive heat causing sweating.  Therefore, I am not sure that the pruritus she was experiencing is secondary to the polycythemia vera.    PLAN:  1. No phlebotomy today although her hematocrit is 43%.  With a low MCV and low reticulated hemoglobin, iron deficiency may be developing.  2. She will continue continue aspirin  3. Continue local therapy for vaginal and perineal pruritus.  Not discussed today.  4. I do not think any medication such as hydroxyurea for polycythemia vera is necessary at this  time.  5. Follow-up in about 2 to 3 months with a CBC reticulocyte count ferritin and iron panel.  Therapeutic phlebotomy if appropriate.  If we continue to struggle with iron deficiency related to frequent therapeutic phlebotomy, hydroxyurea may need to be initiated.

## 2023-02-03 ENCOUNTER — LAB (OUTPATIENT)
Dept: OTHER | Facility: HOSPITAL | Age: 68
End: 2023-02-03
Payer: MEDICARE

## 2023-02-03 ENCOUNTER — APPOINTMENT (OUTPATIENT)
Dept: ONCOLOGY | Facility: HOSPITAL | Age: 68
End: 2023-02-03
Payer: MEDICARE

## 2023-02-03 ENCOUNTER — OFFICE VISIT (OUTPATIENT)
Dept: ONCOLOGY | Facility: CLINIC | Age: 68
End: 2023-02-03
Payer: MEDICARE

## 2023-02-03 VITALS
DIASTOLIC BLOOD PRESSURE: 67 MMHG | HEART RATE: 70 BPM | TEMPERATURE: 97.6 F | WEIGHT: 147.2 LBS | RESPIRATION RATE: 18 BRPM | HEIGHT: 66 IN | SYSTOLIC BLOOD PRESSURE: 107 MMHG | BODY MASS INDEX: 23.66 KG/M2 | OXYGEN SATURATION: 100 %

## 2023-02-03 DIAGNOSIS — D45 POLYCYTHEMIA VERA: Primary | ICD-10-CM

## 2023-02-03 DIAGNOSIS — D45 POLYCYTHEMIA VERA: ICD-10-CM

## 2023-02-03 LAB
BASOPHILS # BLD AUTO: 0.09 10*3/MM3 (ref 0–0.2)
BASOPHILS NFR BLD AUTO: 1.3 % (ref 0–1.5)
DEPRECATED RDW RBC AUTO: 49.7 FL (ref 37–54)
EOSINOPHIL # BLD AUTO: 0.22 10*3/MM3 (ref 0–0.4)
EOSINOPHIL NFR BLD AUTO: 3.1 % (ref 0.3–6.2)
ERYTHROCYTE [DISTWIDTH] IN BLOOD BY AUTOMATED COUNT: 18.2 % (ref 12.3–15.4)
HCT VFR BLD AUTO: 43 % (ref 34–46.6)
HGB BLD-MCNC: 13.4 G/DL (ref 12–15.9)
HGB RETIC QN AUTO: 28.6 PG (ref 29.8–36.1)
IMM GRANULOCYTES # BLD AUTO: 0.12 10*3/MM3 (ref 0–0.05)
IMM GRANULOCYTES NFR BLD AUTO: 1.7 % (ref 0–0.5)
IMM RETICS NFR: 17.3 % (ref 3–15.8)
LYMPHOCYTES # BLD AUTO: 2.26 10*3/MM3 (ref 0.7–3.1)
LYMPHOCYTES NFR BLD AUTO: 32.3 % (ref 19.6–45.3)
MCH RBC QN AUTO: 24.5 PG (ref 26.6–33)
MCHC RBC AUTO-ENTMCNC: 31.2 G/DL (ref 31.5–35.7)
MCV RBC AUTO: 78.6 FL (ref 79–97)
MONOCYTES # BLD AUTO: 0.65 10*3/MM3 (ref 0.1–0.9)
MONOCYTES NFR BLD AUTO: 9.3 % (ref 5–12)
NEUTROPHILS NFR BLD AUTO: 3.65 10*3/MM3 (ref 1.7–7)
NEUTROPHILS NFR BLD AUTO: 52.3 % (ref 42.7–76)
NRBC BLD AUTO-RTO: 0 /100 WBC (ref 0–0.2)
PLATELET # BLD AUTO: 354 10*3/MM3 (ref 140–450)
PMV BLD AUTO: 10.6 FL (ref 6–12)
RBC # BLD AUTO: 5.47 10*6/MM3 (ref 3.77–5.28)
RETICS # AUTO: 0.06 10*6/MM3 (ref 0.02–0.13)
RETICS/RBC NFR AUTO: 1.14 % (ref 0.7–1.9)
WBC NRBC COR # BLD: 6.99 10*3/MM3 (ref 3.4–10.8)

## 2023-02-03 PROCEDURE — 99213 OFFICE O/P EST LOW 20 MIN: CPT | Performed by: INTERNAL MEDICINE

## 2023-02-03 PROCEDURE — 36415 COLL VENOUS BLD VENIPUNCTURE: CPT

## 2023-02-03 PROCEDURE — 85025 COMPLETE CBC W/AUTO DIFF WBC: CPT | Performed by: INTERNAL MEDICINE

## 2023-02-03 PROCEDURE — 85046 RETICYTE/HGB CONCENTRATE: CPT | Performed by: INTERNAL MEDICINE

## 2023-02-27 ENCOUNTER — HOSPITAL ENCOUNTER (OUTPATIENT)
Dept: CARDIOLOGY | Facility: HOSPITAL | Age: 68
Discharge: HOME | End: 2023-02-27

## 2023-02-27 ENCOUNTER — HOSPITAL ENCOUNTER (OUTPATIENT)
Dept: CT IMAGING | Facility: HOSPITAL | Age: 68
Discharge: HOME | End: 2023-02-27

## 2023-02-27 ENCOUNTER — TELEPHONE (OUTPATIENT)
Dept: INTERNAL MEDICINE | Facility: CLINIC | Age: 68
End: 2023-02-27
Payer: MEDICARE

## 2023-02-27 DIAGNOSIS — Z13.9 SCREENING FOR CONDITION: ICD-10-CM

## 2023-02-27 PROCEDURE — 75571 CT HRT W/O DYE W/CA TEST: CPT

## 2023-02-27 PROCEDURE — 93799 UNLISTED CV SVC/PROCEDURE: CPT

## 2023-02-27 NOTE — TELEPHONE ENCOUNTER
Pt informed      ----- Message from Viri Caldera MD sent at 2/27/2023 12:30 PM EST -----  CT cardiac calcium score is 0. This places you at a low risk for a cardiac event in the next 5 years. Continue healthy nutrition w/ healthy movement.   JW

## 2023-03-05 LAB
BH CV XLRA MEAS - MID AO DIAM: 1.6 CM
BH CV XLRA MEAS - PAD LEFT ABI PT: 1.17
BH CV XLRA MEAS - PAD LEFT ARM: 92 MMHG
BH CV XLRA MEAS - PAD LEFT LEG PT: 129 MMHG
BH CV XLRA MEAS - PAD RIGHT ABI PT: 1.15
BH CV XLRA MEAS - PAD RIGHT ARM: 110 MMHG
BH CV XLRA MEAS - PAD RIGHT LEG PT: 127 MMHG
BH CV XLRA MEAS LEFT DIST CCA EDV: -32.9 CM/SEC
BH CV XLRA MEAS LEFT DIST CCA PSV: -89.5 CM/SEC
BH CV XLRA MEAS LEFT ICA/CCA RATIO: 0.9
BH CV XLRA MEAS LEFT MID CCA PSV: 90 CM/SEC
BH CV XLRA MEAS LEFT MID ICA PSV: 82 CM/SEC
BH CV XLRA MEAS LEFT PROX ICA EDV: -31.7 CM/SEC
BH CV XLRA MEAS LEFT PROX ICA PSV: -82 CM/SEC
BH CV XLRA MEAS RIGHT DIST CCA EDV: 26.1 CM/SEC
BH CV XLRA MEAS RIGHT DIST CCA PSV: 69.6 CM/SEC
BH CV XLRA MEAS RIGHT ICA/CCA RATIO: 1.2
BH CV XLRA MEAS RIGHT MID CCA PSV: 70 CM/SEC
BH CV XLRA MEAS RIGHT MID ICA PSV: 81 CM/SEC
BH CV XLRA MEAS RIGHT PROX ICA EDV: -33.5 CM/SEC
BH CV XLRA MEAS RIGHT PROX ICA PSV: -80.8 CM/SEC
MAXIMAL PREDICTED HEART RATE: 150 BPM
STRESS TARGET HR: 128 BPM

## 2023-04-13 RX ORDER — SODIUM CHLORIDE 9 MG/ML
250 INJECTION, SOLUTION INTRAVENOUS ONCE
OUTPATIENT
Start: 2023-04-13

## 2023-04-13 NOTE — PROGRESS NOTES
"[facility] GROUP OUTPATIENT FOLLOW UP CLINIC VISIT    REASON FOR FOLLOW-UP:    JAK2 V617F mutation positive polycythemia vera  Current therapy with therapeutic phlebotomy    HISTORY OF PRESENT ILLNESS:  Fred Farr is a 68 y.o. female who returns today for follow up of the above issue.      She states that she has been feeling better over the past few months.  No ice craving.  No restless legs.  Energy level is improved.    REVIEW OF SYSTEMS:  As per the [facility]    PHYSICAL EXAMINATION:    Vitals:    04/14/23 0833   BP: 120/69   Pulse: 63   Resp: 18   Temp: 97.5 °F (36.4 °C)   TempSrc: Temporal   SpO2: 100%   Weight: 68.5 kg (151 lb)   Height: 168.9 cm (66.5\")   PainSc: 0-No pain      General:  No acute distress, awake, alert and oriented  Skin:  Warm and dry, no visible rash  HEENT:  Normocephalic/atraumatic.  Wearing a face mask.  Chest:  Normal respiratory effort.  Lungs clear to auscultation bilaterally.  Heart: Regular rate and rhythm  Lymphatics: No palpable cervical supraclavicular or axillary adenopathy  Extremities:  No visible clubbing, cyanosis, or edema  Neuro/psych:  Grossly nonfocal.  Normal mood and affect.    DIAGNOSTIC DATA:  CBC & Differential (04/14/2023 08:28)  Retic With IRF & RET-He (04/14/2023 08:28)      IMAGING:  None reviewed    ASSESSMENT:  This is a 68 y.o. female with:    *Polycythemia vera, JAK2 V617F mutation positive  · Hgb normal until 11/12/2020 with hgb 16.0, hct 49.8%.  · Hct mildly elevated since 2016 at 47-48%  · She, however, states that this has been a mild and chronic issue  · She does take aspirin 81 mg daily  · She states that her Fitbit tells her that her oxygen levels dropped a little bit at night.  She carries no formal diagnosis of sleep apnea.  No congenital heart problems.  No significant time at high altitudes.  She does not smoke.  She denies any carbon monoxide exposure.  · She was seen initially on 1/8/2021.  Hemoglobin 15.8 with hematocrit 48.3%.  " Laboratory evaluation pursued.  Erythropoietin normal at 4.5.  Carbon monoxide normal at 2.0.  Ferritin normal at 99.6 with normal iron studies.  · She was referred to sleep medicine because of the erythrocytosis and because her Fitbit was telling her that her oxygen level dropped at night.  She saw Dr. Dhillon.  She was found to have mild obstructive sleep apnea by home sleep study 5/24/2021.  She has an appliance now which she is using.  This was recently replaced as her old one fractured.  · JAK2 V617F mutation positive  · Therapeutic phlebotomy initiated 3/28/2022 with a hematocrit of 48.3%.  Goal less than 42%.  · 4/29/2022: Hemoglobin improved at 15.3 with hematocrit improved to 47%.  Still above our goal of 42%.  · 5/27/2022: Hematocrit near goal at 42.7%.  We did not perform therapeutic phlebotomy.  · For a hematocrit of 43.8% she did have phlebotomy on 7/8/2022  · We have not yet initiated hydroxyurea, though this may be necessary  · 8/19/2022: Hemoglobin 13.4 and hematocrit at goal at 40.8%  · 11/18/2022: Hemoglobin higher at 14.7 with hematocrit 46.5%.  Phlebotomy required.  Other blood counts remain normal.  · 2/3/2023: Hematocrit 43.0%.  However, the MCV is slightly low and the reticulated hemoglobin is low and I believe she may be getting iron deficient.  Therefore, hold phlebotomy.  · 4/14/2023: Hemoglobin 13.2 with hematocrit 42.4%.  The MCV remains low at 77.9.  Iron studies and ferritin pending.  No phlebotomy as the hematocrit is essentially at goal and there is still concern for the possibility of iron deficiency.     *History of vaginal and perineal pruritus  · Not discussed again today    PLAN:    1. Follow-up pending iron studies and ferritin.  Even if she is a little iron deficient, if we replete her iron the hemoglobin and hematocrit will likely increase and as a result of that she would need phlebotomy which would then deplete the iron again.  2. No phlebotomy is necessary today  3. She  will continue continue aspirin  4. Follow-up in 3 months with labs

## 2023-04-14 ENCOUNTER — LAB (OUTPATIENT)
Dept: OTHER | Facility: HOSPITAL | Age: 68
End: 2023-04-14
Payer: MEDICARE

## 2023-04-14 ENCOUNTER — APPOINTMENT (OUTPATIENT)
Dept: ONCOLOGY | Facility: HOSPITAL | Age: 68
End: 2023-04-14
Payer: MEDICARE

## 2023-04-14 ENCOUNTER — OFFICE VISIT (OUTPATIENT)
Dept: ONCOLOGY | Facility: CLINIC | Age: 68
End: 2023-04-14
Payer: MEDICARE

## 2023-04-14 VITALS
OXYGEN SATURATION: 100 % | HEIGHT: 66 IN | SYSTOLIC BLOOD PRESSURE: 120 MMHG | RESPIRATION RATE: 18 BRPM | BODY MASS INDEX: 24.27 KG/M2 | HEART RATE: 63 BPM | TEMPERATURE: 97.5 F | WEIGHT: 151 LBS | DIASTOLIC BLOOD PRESSURE: 69 MMHG

## 2023-04-14 DIAGNOSIS — D45 POLYCYTHEMIA VERA: Primary | ICD-10-CM

## 2023-04-14 DIAGNOSIS — D45 POLYCYTHEMIA VERA: ICD-10-CM

## 2023-04-14 LAB
BASOPHILS # BLD AUTO: 0.08 10*3/MM3 (ref 0–0.2)
BASOPHILS NFR BLD AUTO: 1.4 % (ref 0–1.5)
DEPRECATED RDW RBC AUTO: 49.7 FL (ref 37–54)
EOSINOPHIL # BLD AUTO: 0.12 10*3/MM3 (ref 0–0.4)
EOSINOPHIL NFR BLD AUTO: 2.1 % (ref 0.3–6.2)
ERYTHROCYTE [DISTWIDTH] IN BLOOD BY AUTOMATED COUNT: 18.3 % (ref 12.3–15.4)
FERRITIN SERPL-MCNC: 9.7 NG/ML (ref 13–150)
HCT VFR BLD AUTO: 42.4 % (ref 34–46.6)
HGB BLD-MCNC: 13.2 G/DL (ref 12–15.9)
HGB RETIC QN AUTO: 26.9 PG (ref 29.8–36.1)
IMM GRANULOCYTES # BLD AUTO: 0.09 10*3/MM3 (ref 0–0.05)
IMM GRANULOCYTES NFR BLD AUTO: 1.6 % (ref 0–0.5)
IMM RETICS NFR: 15.6 % (ref 3–15.8)
IRON 24H UR-MRATE: 44 MCG/DL (ref 37–145)
IRON SATN MFR SERPL: 9 % (ref 20–50)
LYMPHOCYTES # BLD AUTO: 2.01 10*3/MM3 (ref 0.7–3.1)
LYMPHOCYTES NFR BLD AUTO: 35.8 % (ref 19.6–45.3)
MCH RBC QN AUTO: 24.3 PG (ref 26.6–33)
MCHC RBC AUTO-ENTMCNC: 31.1 G/DL (ref 31.5–35.7)
MCV RBC AUTO: 77.9 FL (ref 79–97)
MONOCYTES # BLD AUTO: 0.63 10*3/MM3 (ref 0.1–0.9)
MONOCYTES NFR BLD AUTO: 11.2 % (ref 5–12)
NEUTROPHILS NFR BLD AUTO: 2.68 10*3/MM3 (ref 1.7–7)
NEUTROPHILS NFR BLD AUTO: 47.9 % (ref 42.7–76)
NRBC BLD AUTO-RTO: 0 /100 WBC (ref 0–0.2)
PLATELET # BLD AUTO: 351 10*3/MM3 (ref 140–450)
PMV BLD AUTO: 10.3 FL (ref 6–12)
RBC # BLD AUTO: 5.44 10*6/MM3 (ref 3.77–5.28)
RETICS # AUTO: 0.06 10*6/MM3 (ref 0.02–0.13)
RETICS/RBC NFR AUTO: 1.14 % (ref 0.7–1.9)
TIBC SERPL-MCNC: 469 MCG/DL (ref 298–536)
TRANSFERRIN SERPL-MCNC: 315 MG/DL (ref 200–360)
WBC NRBC COR # BLD: 5.61 10*3/MM3 (ref 3.4–10.8)

## 2023-04-14 PROCEDURE — 84466 ASSAY OF TRANSFERRIN: CPT | Performed by: INTERNAL MEDICINE

## 2023-04-14 PROCEDURE — 82728 ASSAY OF FERRITIN: CPT | Performed by: INTERNAL MEDICINE

## 2023-04-14 PROCEDURE — 99213 OFFICE O/P EST LOW 20 MIN: CPT | Performed by: INTERNAL MEDICINE

## 2023-04-14 PROCEDURE — 1160F RVW MEDS BY RX/DR IN RCRD: CPT | Performed by: INTERNAL MEDICINE

## 2023-04-14 PROCEDURE — 85046 RETICYTE/HGB CONCENTRATE: CPT | Performed by: INTERNAL MEDICINE

## 2023-04-14 PROCEDURE — 85025 COMPLETE CBC W/AUTO DIFF WBC: CPT | Performed by: INTERNAL MEDICINE

## 2023-04-14 PROCEDURE — 83540 ASSAY OF IRON: CPT | Performed by: INTERNAL MEDICINE

## 2023-04-14 PROCEDURE — 36415 COLL VENOUS BLD VENIPUNCTURE: CPT

## 2023-04-14 PROCEDURE — 1159F MED LIST DOCD IN RCRD: CPT | Performed by: INTERNAL MEDICINE

## 2023-04-14 PROCEDURE — 1126F AMNT PAIN NOTED NONE PRSNT: CPT | Performed by: INTERNAL MEDICINE

## 2023-04-18 ENCOUNTER — OFFICE VISIT (OUTPATIENT)
Dept: INTERNAL MEDICINE | Facility: CLINIC | Age: 68
End: 2023-04-18
Payer: MEDICARE

## 2023-04-18 VITALS
HEART RATE: 71 BPM | WEIGHT: 148.9 LBS | SYSTOLIC BLOOD PRESSURE: 92 MMHG | BODY MASS INDEX: 23.37 KG/M2 | HEIGHT: 67 IN | DIASTOLIC BLOOD PRESSURE: 56 MMHG | OXYGEN SATURATION: 98 %

## 2023-04-18 DIAGNOSIS — G47.33 OSA (OBSTRUCTIVE SLEEP APNEA): ICD-10-CM

## 2023-04-18 DIAGNOSIS — D45 POLYCYTHEMIA VERA: Primary | ICD-10-CM

## 2023-04-18 DIAGNOSIS — F41.9 ANXIETY: ICD-10-CM

## 2023-04-18 DIAGNOSIS — Z23 NEED FOR VACCINATION: ICD-10-CM

## 2023-04-18 NOTE — PROGRESS NOTES
Chief Complaint   Patient presents with   • cognitive concerns   • polycythemia vera   • gui   • Anxiety       History of Present Illness   Fred Farr is a 68 y.o. female presents for follow up evaluation. Patient reports improvement in her health care from last visit. She did have a possibly precancerous lesion removed from her forehead. She was having cognitive concerns. Testing non worrisome. He does not have anything to suggest coginive impairment. Previously having vertigo. This has resolved.   Patient has polycythemia vera. Has followed w/ hematology and this is going well. Has GUI> wearing mandibular device. Some evenings this is limited to 4 hours. Patient notes that her anxiety has decreased as well.           The following portions of the patient's history were reviewed and updated as appropriate: allergies, current medications, past family history, past medical history, past social history, past surgical history and problem list.  Current Outpatient Medications on File Prior to Visit   Medication Sig Dispense Refill   • acyclovir (ZOVIRAX) 5 % ointment Apply to affected area tid prn 15 g 0   • albuterol sulfate  (90 Base) MCG/ACT inhaler Inhale 2 puffs Every 4 (Four) Hours As Needed for Wheezing. 18 g 0   • aspirin 81 MG EC tablet Take 1 tablet by mouth Daily. 2 pills     • buPROPion XL (WELLBUTRIN XL) 150 MG 24 hr tablet TAKE 1 TABLET BY MOUTH IN  THE MORNING 90 tablet 3   • flunisolide (NASALIDE) 25 MCG/ACT (0.025%) solution nasal spray 2 sprays 2 (Two) Times a Day.     • ipratropium (ATROVENT) 0.06 % nasal spray USE 2 SPRAYS IN EACH NOSTRIL EVERY 6 HOURS AS NEEDED     • montelukast (SINGULAIR) 10 MG tablet TAKE 1 TABLET BY MOUTH AT  NIGHT 90 tablet 3   • valACYclovir (Valtrex) 1000 MG tablet Take 2 tablets by mouth Daily. 30 tablet 1   • [DISCONTINUED] cephalexin (Keflex) 500 MG capsule Take 1 capsule by mouth 2 (Two) Times a Day. 14 capsule 0   • [DISCONTINUED] phenazopyridine (Pyridium) 200  MG tablet Take 1 tablet by mouth 3 (Three) Times a Day As Needed for Bladder Spasms. (Patient not taking: Reported on 4/18/2023) 15 tablet 0     No current facility-administered medications on file prior to visit.     Review of Systems   Constitutional: Negative.    HENT: Negative.    Eyes: Negative.    Respiratory: Negative.    Cardiovascular: Negative.    Gastrointestinal: Negative.    Endocrine: Negative.    Genitourinary: Negative.    Musculoskeletal: Negative.    Skin: Negative.    Allergic/Immunologic: Negative.    Neurological: Negative.    Hematological: Negative.    Psychiatric/Behavioral: Negative.        Objective   Physical Exam  Vitals and nursing note reviewed.   Constitutional:       Appearance: Normal appearance. She is well-developed.   HENT:      Head: Normocephalic and atraumatic.      Right Ear: Tympanic membrane and external ear normal.      Left Ear: Tympanic membrane and external ear normal.      Nose: Nose normal.      Mouth/Throat:      Mouth: Mucous membranes are moist.   Eyes:      Extraocular Movements: Extraocular movements intact.      Pupils: Pupils are equal, round, and reactive to light.   Cardiovascular:      Rate and Rhythm: Normal rate and regular rhythm.      Pulses: Normal pulses.      Heart sounds: Normal heart sounds.   Pulmonary:      Effort: Pulmonary effort is normal. No respiratory distress.      Breath sounds: Normal breath sounds.   Abdominal:      General: Abdomen is flat.      Palpations: Abdomen is soft.   Musculoskeletal:         General: Normal range of motion.      Cervical back: Normal range of motion and neck supple.   Skin:     General: Skin is warm and dry.   Neurological:      General: No focal deficit present.      Mental Status: She is alert and oriented to person, place, and time.   Psychiatric:         Mood and Affect: Mood normal.         Behavior: Behavior normal.         Thought Content: Thought content normal.         Judgment: Judgment normal.       "    BP 92/56   Pulse 71   Ht 168.9 cm (66.5\")   Wt 67.5 kg (148 lb 14.4 oz)   SpO2 98%   BMI 23.67 kg/m²     Assessment & Plan   Diagnoses and all orders for this visit:    Polycythemia vera    FERDINAND (obstructive sleep apnea)    Anxiety      Patient with polycythemia vera. Will continue to follow w/ hematology. She will continue mandibular device for ferdinand. She has concerns of frequent infections. To get covid vac booster today. She will continue wellbutrin for mood. Extensively reviewed reports from neuro and neuropsych and reassured patient of normal cognition/ normal aging changes. Continue current meds for mood. To follow up here in 8 months or prn.          Answers for HPI/ROS submitted by the patient on 4/12/2023  Please describe your symptoms.: Follow up  Have you had these symptoms before?: Yes  How long have you been having these symptoms?: Greater than 2 weeks  What is the primary reason for your visit?: Other      "

## 2023-08-28 ENCOUNTER — TELEPHONE (OUTPATIENT)
Dept: ONCOLOGY | Facility: CLINIC | Age: 68
End: 2023-08-28
Payer: MEDICARE

## 2023-08-28 NOTE — TELEPHONE ENCOUNTER
"  Caller: Fred Farr \"Una\"    Relationship: Self    Best call back number: [phone]    What is the best time to reach you: ANY.[email or web address] VM    Who are you requesting to speak with (clinical staff, provider,  specific staff member): SCHEDULING        What was the call regarding: PATIENT CALLED TO R/S APPTS FOR 9/22/23. SHE NEEDS TO MOVE THEM TWO WEEKS OUT FROM THAT DAY.     SHE PREFERS EARLY MORNING APPTS.    Is it okay if the provider responds through MyChart: NO          "

## 2023-09-08 RX ORDER — BUPROPION HYDROCHLORIDE 150 MG/1
TABLET ORAL
Qty: 90 TABLET | Refills: 3 | Status: SHIPPED | OUTPATIENT
Start: 2023-09-08

## 2023-09-27 ENCOUNTER — OFFICE VISIT (OUTPATIENT)
Dept: INTERNAL MEDICINE | Facility: CLINIC | Age: 68
End: 2023-09-27
Payer: MEDICARE

## 2023-09-27 VITALS
HEART RATE: 84 BPM | HEIGHT: 67 IN | BODY MASS INDEX: 22.91 KG/M2 | DIASTOLIC BLOOD PRESSURE: 58 MMHG | SYSTOLIC BLOOD PRESSURE: 100 MMHG | WEIGHT: 146 LBS | OXYGEN SATURATION: 98 %

## 2023-09-27 DIAGNOSIS — H10.9 BACTERIAL CONJUNCTIVITIS OF LEFT EYE: Primary | ICD-10-CM

## 2023-09-27 PROCEDURE — 99213 OFFICE O/P EST LOW 20 MIN: CPT | Performed by: STUDENT IN AN ORGANIZED HEALTH CARE EDUCATION/TRAINING PROGRAM

## 2023-09-27 RX ORDER — OFLOXACIN 3 MG/ML
SOLUTION/ DROPS OPHTHALMIC
Qty: 10 ML | Refills: 0 | Status: SHIPPED | OUTPATIENT
Start: 2023-09-27

## 2023-09-27 NOTE — PROGRESS NOTES
"  Gerald Kruse D.O.  Internal Medicine  [facility]  [address], [address]  [place] [address]  [phone]      Chief Complaint  Conjunctivitis (Left eye)    SUBJECTIVE    History of Present Illness    Fred Farr is a 68 y.o. female who presents to the office today as an established patient of Dr Viri Caldera MD here today for an acute care visit.     Starting yesterday evening like a ton of bricks there was some watery and yellow drainage from the left eye. There is no swelling or itching of the eye but it does feel like \"grit\". No pain in the eye, just kind of achy. She had some oflaxacin eye drops from past bacterial conjunctivitis. She used those twice last night and three times today. Discharge from the eye has stopped. No sneezing, no vision changes, no allergy symptoms. No fever/chills, muscle aches or body aches. Denies headaches or dizzines.   States every 3-4 years she feels that she gets bacterial conjunctivitis.     Allergies   Allergen Reactions    Aspergillus Species Anaphylaxis    Aureobasidium Allergy Skin Test Anaphylaxis    Botrytis Cinerea Allergy Skin Test Anaphylaxis    Underwood Anaphylaxis    Cladosporium Cladosporioides Allergy Skin Test Anaphylaxis    Corn Pollen Anaphylaxis    Dust Mite Extract Anaphylaxis        Outpatient Medications Marked as Taking for the 9/27/23 encounter (Office Visit) with Gerald Kruse, DO   Medication Sig Dispense Refill    acyclovir (ZOVIRAX) 5 % ointment Apply to affected area tid prn 15 g 0    albuterol sulfate  (90 Base) MCG/ACT inhaler Inhale 2 puffs Every 4 (Four) Hours As Needed for Wheezing. 18 g 0    aspirin 81 MG EC tablet Take 1 tablet by mouth Daily. 2 pills      buPROPion XL (WELLBUTRIN XL) 150 MG 24 hr tablet TAKE 1 TABLET BY MOUTH IN  THE MORNING 90 tablet 3    flunisolide (NASALIDE) 25 MCG/ACT (0.025%) solution nasal spray 2 sprays 2 (Two) Times a Day.      ipratropium (ATROVENT) 0.06 % nasal spray USE 2 SPRAYS " "IN EACH NOSTRIL EVERY 6 HOURS AS NEEDED      montelukast (SINGULAIR) 10 MG tablet TAKE 1 TABLET BY MOUTH AT  NIGHT 90 tablet 3    valACYclovir (Valtrex) 1000 MG tablet Take 2 tablets by mouth Daily. 30 tablet 1        Past Medical History:   Diagnosis Date    Allergic 2013    Recently tested--mild allergies    Anxiety     Depression     HL (hearing loss) 2000    Low blood pressure     FERDINAND (obstructive sleep apnea) 05/24/2021    Home sleep study.  Mild FERDINAND with AHI 8 events per hour.  No sleep-related hypoxia.  The patient snored 2.5% of total monitoring time.    Right bundle branch block     Skin cancer        OBJECTIVE    Vital Signs:   /58   Pulse 84   Ht 168.9 cm (66.5\")   Wt 66.2 kg (146 lb)   SpO2 98%   BMI 23.21 kg/m²     Physical Exam  Vitals reviewed.   Constitutional:       General: She is not in acute distress.     Appearance: Normal appearance. She is not ill-appearing.   HENT:      Head: Atraumatic.   Eyes:      General: Lids are normal. No scleral icterus.        Right eye: No discharge.         Left eye: No discharge.      Extraocular Movements: Extraocular movements intact.      Conjunctiva/sclera:      Right eye: Right conjunctiva is not injected. No chemosis, exudate or hemorrhage.     Left eye: Left conjunctiva is injected. No exudate or hemorrhage.     Pupils: Pupils are equal, round, and reactive to light.        Comments: Cobblestoning of the lower lid conjunctiva on the left.  No active discharge from the left eye.   Pulmonary:      Effort: Pulmonary effort is normal. No respiratory distress.   Neurological:      Mental Status: She is alert.   Psychiatric:         Mood and Affect: Mood normal.         Behavior: Behavior normal.         Thought Content: Thought content normal.                           ASSESSMENT & PLAN     Diagnoses and all orders for this visit:    1. Bacterial conjunctivitis of left eye (Primary)  --pt presents today for acute care visit   -1 day duration of " symptoms consistent with bacterial conjunctivitis  -she automatically started using a previous Rx for ofloxacin drops with improvement in symptoms  -overall advised that clinical picture is clouded because she began treatment before the issue could be evaluated but overall given her symptoms I believe it is OK to continue ofloxacin drops and I will send a new Rx to her pharmacy  -return to clinic if worsening of symptoms  -we discussed hand hygiene and recommended disposing of recent eyeliners and no reapplying eyeliner until her symptoms resolve   -     ofloxacin (Ocuflox) 0.3 % ophthalmic solution; Instill 1 to 2 drops in affected eye(s) every 2 to 4 hours for the first 2 days, then instill 1 to 2 drops 4 times daily for an additional 5 days. Total treatment of 7 days.  Dispense: 10 mL; Refill: 0            The following social determinates of health impact the patient's medical decision making: No social determinates of health were factored in to today's visit.     Follow Up  No follow-ups on file.    Patient/family had no further questions at this time and verbalized understanding of the plan discussed today.

## 2023-09-28 NOTE — PROGRESS NOTES
"[facility] CBC GROUP OUTPATIENT FOLLOW UP CLINIC VISIT    REASON FOR FOLLOW-UP:    JAK2 V617F mutation positive polycythemia vera  Current therapy with therapeutic phlebotomy    HISTORY OF PRESENT ILLNESS:  Fred Farr is a 68 y.o. female who returns today for follow up of the above issue.      She continues to do well.  She feels well.  No syncope or lightheadedness.    REVIEW OF SYSTEMS:  As per the [facility]    PHYSICAL EXAMINATION:    Vitals:    10/06/23 0847   BP: 95/64   Pulse: 73   Resp: 16   Temp: 97.3 °F (36.3 °C)   TempSrc: Temporal   SpO2: 98%   Weight: 67.3 kg (148 lb 4.8 oz)   Height: 168 cm (66.14\")   PainSc: 0-No pain       General:  No acute distress, awake, alert and oriented  Skin:  Warm and dry, no visible rash  HEENT:  Normocephalic/atraumatic.   Chest:  Normal respiratory effort  Extremities:  No visible clubbing, cyanosis, or edema  Neuro/psych:  Grossly nonfocal.  Normal mood and affect.       DIAGNOSTIC DATA:  CBC & Differential (10/06/2023 08:40)  Retic With IRF & RET-He (10/06/2023 08:40)  Ferritin (10/06/2023 08:40)  Iron Profile (10/06/2023 08:40)    IMAGING:  None reviewed    ASSESSMENT:  This is a 68 y.o. female with:    *Polycythemia vera, JAK2 V617F mutation positive  Hgb normal until 11/12/2020 with hgb 16.0, hct 49.8%.  Hct mildly elevated since 2016 at 47-48%  She, however, states that this has been a mild and chronic issue  She does take aspirin 81 mg daily  She states that her Fitbit tells her that her oxygen levels dropped a little bit at night.  She carries no formal diagnosis of sleep apnea.  No congenital heart problems.  No significant time at high altitudes.  She does not smoke.  She denies any carbon monoxide exposure.  She was seen initially on 1/8/2021.  Hemoglobin 15.8 with hematocrit 48.3%.  Laboratory evaluation pursued.  Erythropoietin normal at 4.5.  Carbon monoxide normal at 2.0.  Ferritin normal at 99.6 with normal iron studies.  She was referred to sleep medicine " because of the erythrocytosis and because her Fitbit was telling her that her oxygen level dropped at night.  She saw Dr. Dhillon.  She was found to have mild obstructive sleep apnea by home sleep study 5/24/2021.  She has an appliance now which she is using.  This was recently replaced as her old one fractured.  JAK2 V617F mutation positive  Therapeutic phlebotomy initiated 3/28/2022 with a hematocrit of 48.3%.  Goal less than 42%.  4/29/2022: Hemoglobin improved at 15.3 with hematocrit improved to 47%.  Still above our goal of 42%.  5/27/2022: Hematocrit near goal at 42.7%.  We did not perform therapeutic phlebotomy.  For a hematocrit of 43.8% she did have phlebotomy on 7/8/2022  We have not yet initiated hydroxyurea, though this may be necessary  8/19/2022: Hemoglobin 13.4 and hematocrit at goal at 40.8%  11/18/2022: Hemoglobin higher at 14.7 with hematocrit 46.5%.  Phlebotomy required.  Other blood counts remain normal.  2/3/2023: Hematocrit 43.0%.  However, the MCV is slightly low and the reticulated hemoglobin is low and I believe she may be getting iron deficient.  Therefore, hold phlebotomy.  4/14/2023: Hemoglobin 13.2 with hematocrit 42.4%.  The MCV remains low at 77.9.  Ferritin 9.7 with an iron of 44 and 9% iron saturation  7/7/2023: Hemoglobin 13.8, hematocrit 44.5%.  Iron scratch that ferritin 13.9, iron 60 with 13% iron saturation.  Therapeutic phlebotomy not performed due to recent issues with iron deficiency  10/6/2023: Hemoglobin 14.0, hematocrit 44.8%, MCV 84.1.  Ferritin 13.2, iron 57 with 13% iron saturation.     *History of vaginal and perineal pruritus  Not discussed again today    *Recent iron deficiency  Likely result of phlebotomy.    10/6/2023: Ferritin 13.2, iron 57 with 13% iron saturation.  Stable.    *Hypotension  No syncope.  Blood pressure 95/64.  Asymptomatic.  Another reason to avoid therapeutic phlebotomy unless absolutely necessary    PLAN:    Continue aspirin  No phlebotomy  today as the hemoglobin is stable although a little bit above our goal of 42% but her iron studies remain low  Follow-up in early January with labs and an appointment for therapeutic phlebotomy if appropriate.

## 2023-10-06 ENCOUNTER — APPOINTMENT (OUTPATIENT)
Dept: ONCOLOGY | Facility: HOSPITAL | Age: 68
End: 2023-10-06
Payer: MEDICARE

## 2023-10-06 ENCOUNTER — LAB (OUTPATIENT)
Dept: OTHER | Facility: HOSPITAL | Age: 68
End: 2023-10-06
Payer: MEDICARE

## 2023-10-06 ENCOUNTER — OFFICE VISIT (OUTPATIENT)
Dept: ONCOLOGY | Facility: CLINIC | Age: 68
End: 2023-10-06
Payer: MEDICARE

## 2023-10-06 VITALS
RESPIRATION RATE: 16 BRPM | TEMPERATURE: 97.3 F | HEART RATE: 73 BPM | DIASTOLIC BLOOD PRESSURE: 64 MMHG | HEIGHT: 66 IN | BODY MASS INDEX: 23.83 KG/M2 | OXYGEN SATURATION: 98 % | WEIGHT: 148.3 LBS | SYSTOLIC BLOOD PRESSURE: 95 MMHG

## 2023-10-06 DIAGNOSIS — D45 POLYCYTHEMIA VERA: ICD-10-CM

## 2023-10-06 DIAGNOSIS — D45 POLYCYTHEMIA VERA: Primary | ICD-10-CM

## 2023-10-06 LAB
BASOPHILS # BLD AUTO: 0.07 10*3/MM3 (ref 0–0.2)
BASOPHILS NFR BLD AUTO: 1.1 % (ref 0–1.5)
DEPRECATED RDW RBC AUTO: 51.5 FL (ref 37–54)
EOSINOPHIL # BLD AUTO: 0.1 10*3/MM3 (ref 0–0.4)
EOSINOPHIL NFR BLD AUTO: 1.6 % (ref 0.3–6.2)
ERYTHROCYTE [DISTWIDTH] IN BLOOD BY AUTOMATED COUNT: 17 % (ref 12.3–15.4)
FERRITIN SERPL-MCNC: 13.2 NG/ML (ref 13–150)
HCT VFR BLD AUTO: 44.8 % (ref 34–46.6)
HGB BLD-MCNC: 14 G/DL (ref 12–15.9)
HGB RETIC QN AUTO: 28.2 PG (ref 29.8–36.1)
IMM GRANULOCYTES # BLD AUTO: 0.02 10*3/MM3 (ref 0–0.05)
IMM GRANULOCYTES NFR BLD AUTO: 0.3 % (ref 0–0.5)
IMM RETICS NFR: 10.3 % (ref 3–15.8)
IRON 24H UR-MRATE: 57 MCG/DL (ref 37–145)
IRON SATN MFR SERPL: 13 % (ref 20–50)
LYMPHOCYTES # BLD AUTO: 1.86 10*3/MM3 (ref 0.7–3.1)
LYMPHOCYTES NFR BLD AUTO: 29.2 % (ref 19.6–45.3)
MCH RBC QN AUTO: 26.3 PG (ref 26.6–33)
MCHC RBC AUTO-ENTMCNC: 31.3 G/DL (ref 31.5–35.7)
MCV RBC AUTO: 84.1 FL (ref 79–97)
MONOCYTES # BLD AUTO: 0.55 10*3/MM3 (ref 0.1–0.9)
MONOCYTES NFR BLD AUTO: 8.6 % (ref 5–12)
NEUTROPHILS NFR BLD AUTO: 3.77 10*3/MM3 (ref 1.7–7)
NEUTROPHILS NFR BLD AUTO: 59.2 % (ref 42.7–76)
NRBC BLD AUTO-RTO: 0 /100 WBC (ref 0–0.2)
PLATELET # BLD AUTO: 370 10*3/MM3 (ref 140–450)
PMV BLD AUTO: 10.4 FL (ref 6–12)
RBC # BLD AUTO: 5.33 10*6/MM3 (ref 3.77–5.28)
RETICS # AUTO: 0.06 10*6/MM3 (ref 0.02–0.13)
RETICS/RBC NFR AUTO: 1.12 % (ref 0.7–1.9)
TIBC SERPL-MCNC: 447 MCG/DL (ref 298–536)
TRANSFERRIN SERPL-MCNC: 300 MG/DL (ref 200–360)
WBC NRBC COR # BLD: 6.37 10*3/MM3 (ref 3.4–10.8)

## 2023-10-06 PROCEDURE — 84466 ASSAY OF TRANSFERRIN: CPT | Performed by: INTERNAL MEDICINE

## 2023-10-06 PROCEDURE — 36415 COLL VENOUS BLD VENIPUNCTURE: CPT

## 2023-10-06 PROCEDURE — 85046 RETICYTE/HGB CONCENTRATE: CPT | Performed by: INTERNAL MEDICINE

## 2023-10-06 PROCEDURE — 1159F MED LIST DOCD IN RCRD: CPT | Performed by: INTERNAL MEDICINE

## 2023-10-06 PROCEDURE — 85025 COMPLETE CBC W/AUTO DIFF WBC: CPT | Performed by: INTERNAL MEDICINE

## 2023-10-06 PROCEDURE — 1126F AMNT PAIN NOTED NONE PRSNT: CPT | Performed by: INTERNAL MEDICINE

## 2023-10-06 PROCEDURE — 82728 ASSAY OF FERRITIN: CPT | Performed by: INTERNAL MEDICINE

## 2023-10-06 PROCEDURE — 1160F RVW MEDS BY RX/DR IN RCRD: CPT | Performed by: INTERNAL MEDICINE

## 2023-10-06 PROCEDURE — 83540 ASSAY OF IRON: CPT | Performed by: INTERNAL MEDICINE

## 2023-10-06 PROCEDURE — 99214 OFFICE O/P EST MOD 30 MIN: CPT | Performed by: INTERNAL MEDICINE

## 2023-10-06 RX ORDER — FLUTICASONE PROPIONATE 50 MCG
2 SPRAY, SUSPENSION (ML) NASAL DAILY
COMMUNITY

## 2023-12-07 DIAGNOSIS — G47.33 OSA (OBSTRUCTIVE SLEEP APNEA): ICD-10-CM

## 2023-12-07 DIAGNOSIS — D75.1 ERYTHROCYTOSIS: ICD-10-CM

## 2023-12-07 DIAGNOSIS — J30.9 ALLERGIC RHINITIS, UNSPECIFIED SEASONALITY, UNSPECIFIED TRIGGER: Primary | ICD-10-CM

## 2023-12-07 RX ORDER — VALACYCLOVIR HYDROCHLORIDE 1 G/1
2000 TABLET, FILM COATED ORAL DAILY
Qty: 30 TABLET | Refills: 1 | Status: SHIPPED | OUTPATIENT
Start: 2023-12-07

## 2023-12-13 LAB
BASOPHILS # BLD AUTO: 0.06 10*3/MM3 (ref 0–0.2)
BASOPHILS NFR BLD AUTO: 1 % (ref 0–1.5)
EOSINOPHIL # BLD AUTO: 0.06 10*3/MM3 (ref 0–0.4)
EOSINOPHIL NFR BLD AUTO: 1 % (ref 0.3–6.2)
ERYTHROCYTE [DISTWIDTH] IN BLOOD BY AUTOMATED COUNT: 16.4 % (ref 12.3–15.4)
HCT VFR BLD AUTO: 48.7 % (ref 34–46.6)
HGB BLD-MCNC: 15.3 G/DL (ref 12–15.9)
IMM GRANULOCYTES # BLD AUTO: 0.02 10*3/MM3 (ref 0–0.05)
IMM GRANULOCYTES NFR BLD AUTO: 0.3 % (ref 0–0.5)
LYMPHOCYTES # BLD AUTO: 1.84 10*3/MM3 (ref 0.7–3.1)
LYMPHOCYTES NFR BLD AUTO: 30 % (ref 19.6–45.3)
MCH RBC QN AUTO: 27.4 PG (ref 26.6–33)
MCHC RBC AUTO-ENTMCNC: 31.4 G/DL (ref 31.5–35.7)
MCV RBC AUTO: 87.3 FL (ref 79–97)
MONOCYTES # BLD AUTO: 0.55 10*3/MM3 (ref 0.1–0.9)
MONOCYTES NFR BLD AUTO: 9 % (ref 5–12)
NEUTROPHILS # BLD AUTO: 3.61 10*3/MM3 (ref 1.7–7)
NEUTROPHILS NFR BLD AUTO: 58.7 % (ref 42.7–76)
NRBC BLD AUTO-RTO: 0 /100 WBC (ref 0–0.2)
PLATELET # BLD AUTO: 406 10*3/MM3 (ref 140–450)
RBC # BLD AUTO: 5.58 10*6/MM3 (ref 3.77–5.28)
WBC # BLD AUTO: 6.14 10*3/MM3 (ref 3.4–10.8)

## 2023-12-14 LAB
ALBUMIN SERPL-MCNC: 4.3 G/DL (ref 3.5–5.2)
ALBUMIN/GLOB SERPL: 2 G/DL
ALP SERPL-CCNC: 81 U/L (ref 39–117)
ALT SERPL-CCNC: 17 U/L (ref 1–33)
APPEARANCE UR: CLEAR
AST SERPL-CCNC: 27 U/L (ref 1–32)
BACTERIA #/AREA URNS HPF: NORMAL /HPF
BILIRUB SERPL-MCNC: 0.3 MG/DL (ref 0–1.2)
BILIRUB UR QL STRIP: NEGATIVE
BUN SERPL-MCNC: 26 MG/DL (ref 8–23)
BUN/CREAT SERPL: 26 (ref 7–25)
CALCIUM SERPL-MCNC: 9.5 MG/DL (ref 8.6–10.5)
CASTS URNS QL MICRO: NORMAL /LPF
CHLORIDE SERPL-SCNC: 103 MMOL/L (ref 98–107)
CO2 SERPL-SCNC: 24.9 MMOL/L (ref 22–29)
COLOR UR: YELLOW
CREAT SERPL-MCNC: 1 MG/DL (ref 0.57–1)
EGFRCR SERPLBLD CKD-EPI 2021: 60.7 ML/MIN/1.73
EPI CELLS #/AREA URNS HPF: NORMAL /HPF (ref 0–10)
GLOBULIN SER CALC-MCNC: 2.2 GM/DL
GLUCOSE SERPL-MCNC: 90 MG/DL (ref 65–99)
GLUCOSE UR QL STRIP: NEGATIVE
HGB UR QL STRIP: NEGATIVE
KETONES UR QL STRIP: NEGATIVE
LEUKOCYTE ESTERASE UR QL STRIP: NEGATIVE
MICRO URNS: NORMAL
MICRO URNS: NORMAL
NITRITE UR QL STRIP: NEGATIVE
PH UR STRIP: 6 [PH] (ref 5–7.5)
POTASSIUM SERPL-SCNC: 5.2 MMOL/L (ref 3.5–5.2)
PROT SERPL-MCNC: 6.5 G/DL (ref 6–8.5)
PROT UR QL STRIP: NEGATIVE
RBC #/AREA URNS HPF: NORMAL /HPF (ref 0–2)
SODIUM SERPL-SCNC: 138 MMOL/L (ref 136–145)
SP GR UR STRIP: 1.01 (ref 1–1.03)
TSH SERPL DL<=0.005 MIU/L-ACNC: 1.17 UIU/ML (ref 0.27–4.2)
URINALYSIS REFLEX: NORMAL
UROBILINOGEN UR STRIP-MCNC: 0.2 MG/DL (ref 0.2–1)
WBC #/AREA URNS HPF: NORMAL /HPF (ref 0–5)

## 2023-12-15 ENCOUNTER — OFFICE VISIT (OUTPATIENT)
Dept: INTERNAL MEDICINE | Facility: CLINIC | Age: 68
End: 2023-12-15
Payer: MEDICARE

## 2023-12-15 VITALS
DIASTOLIC BLOOD PRESSURE: 70 MMHG | HEART RATE: 65 BPM | HEIGHT: 67 IN | WEIGHT: 147 LBS | SYSTOLIC BLOOD PRESSURE: 106 MMHG | BODY MASS INDEX: 23.07 KG/M2 | OXYGEN SATURATION: 96 %

## 2023-12-15 DIAGNOSIS — L84 PRE-ULCERATIVE CORN OR CALLOUS: ICD-10-CM

## 2023-12-15 DIAGNOSIS — E78.00 ELEVATED LDL CHOLESTEROL LEVEL: ICD-10-CM

## 2023-12-15 DIAGNOSIS — Z12.31 ENCOUNTER FOR SCREENING MAMMOGRAM FOR BREAST CANCER: ICD-10-CM

## 2023-12-15 DIAGNOSIS — Z12.11 ENCOUNTER FOR COLORECTAL CANCER SCREENING: ICD-10-CM

## 2023-12-15 DIAGNOSIS — Z00.00 HEALTHCARE MAINTENANCE: Primary | ICD-10-CM

## 2023-12-15 DIAGNOSIS — L60.8 TOENAIL DEFORMITY: ICD-10-CM

## 2023-12-15 DIAGNOSIS — Z12.12 ENCOUNTER FOR COLORECTAL CANCER SCREENING: ICD-10-CM

## 2023-12-15 DIAGNOSIS — J30.9 ALLERGIC RHINITIS, UNSPECIFIED SEASONALITY, UNSPECIFIED TRIGGER: ICD-10-CM

## 2023-12-15 DIAGNOSIS — Z12.39 ENCOUNTER FOR BREAST CANCER SCREENING OTHER THAN MAMMOGRAM: ICD-10-CM

## 2023-12-15 PROCEDURE — G0439 PPPS, SUBSEQ VISIT: HCPCS | Performed by: INTERNAL MEDICINE

## 2023-12-15 PROCEDURE — 99213 OFFICE O/P EST LOW 20 MIN: CPT | Performed by: INTERNAL MEDICINE

## 2023-12-15 RX ORDER — MONTELUKAST SODIUM 10 MG/1
10 TABLET ORAL
Qty: 90 TABLET | Refills: 3 | Status: SHIPPED | OUTPATIENT
Start: 2023-12-15 | End: 2023-12-15 | Stop reason: SDUPTHER

## 2023-12-15 RX ORDER — MONTELUKAST SODIUM 10 MG/1
10 TABLET ORAL
Qty: 90 TABLET | Refills: 3 | Status: SHIPPED | OUTPATIENT
Start: 2023-12-15

## 2023-12-15 RX ORDER — BUPROPION HYDROCHLORIDE 150 MG/1
150 TABLET ORAL EVERY MORNING
Qty: 90 TABLET | Refills: 3 | Status: SHIPPED | OUTPATIENT
Start: 2023-12-15 | End: 2023-12-15 | Stop reason: SDUPTHER

## 2023-12-15 RX ORDER — BUPROPION HYDROCHLORIDE 150 MG/1
150 TABLET ORAL EVERY MORNING
Qty: 90 TABLET | Refills: 3 | Status: SHIPPED | OUTPATIENT
Start: 2023-12-15

## 2023-12-15 NOTE — PROGRESS NOTES
The ABCs of the Annual Wellness Visit  Subsequent Medicare Wellness Visit    Subjective    Fred Farr is a 68 y.o. female who presents for a Subsequent Medicare Wellness Visit.    The following portions of the patient's history were reviewed and   updated as appropriate: allergies, current medications, past family history, past medical history, past social history, past surgical history, and problem list.    Compared to one year ago, the patient feels her physical   health is better.    Compared to one year ago, the patient feels her mental   health is better.    Recent Hospitalizations:  She was not admitted to the hospital during the last year.       Current Medical Providers:  Patient Care Team:  Viri Caldera MD as PCP - General (Internal Medicine)  Lenard Pedraza MD as Consulting Physician (Hematology and Oncology)  Viri Caldera MD as Referring Physician (Internal Medicine)  Viri Caldera MD as Consulting Physician (Internal Medicine)    Outpatient Medications Prior to Visit   Medication Sig Dispense Refill    acyclovir (ZOVIRAX) 5 % ointment Apply to affected area tid prn 15 g 0    albuterol sulfate  (90 Base) MCG/ACT inhaler Inhale 2 puffs Every 4 (Four) Hours As Needed for Wheezing. 18 g 0    aspirin 81 MG EC tablet Take 1 tablet by mouth Daily. 2 pills      flunisolide (NASALIDE) 25 MCG/ACT (0.025%) solution nasal spray 2 sprays 2 (Two) Times a Day.      fluticasone (FLONASE) 50 MCG/ACT nasal spray 2 sprays into the nostril(s) as directed by provider Daily.      ipratropium (ATROVENT) 0.06 % nasal spray USE 2 SPRAYS IN EACH NOSTRIL EVERY 6 HOURS AS NEEDED      melatonin 5 MG sublingual tablet sublingual tablet Place  under the tongue.      ofloxacin (Ocuflox) 0.3 % ophthalmic solution Instill 1 to 2 drops in affected eye(s) every 2 to 4 hours for the first 2 days, then instill 1 to 2 drops 4 times daily for an additional 5 days. Total treatment of 7 days. 10 mL 0    valACYclovir (VALTREX)  "1000 MG tablet TAKE 2 TABLETS BY MOUTH DAILY 30 tablet 1    buPROPion XL (WELLBUTRIN XL) 150 MG 24 hr tablet TAKE 1 TABLET BY MOUTH IN  THE MORNING 90 tablet 3    montelukast (SINGULAIR) 10 MG tablet TAKE 1 TABLET BY MOUTH AT  NIGHT 90 tablet 3     No facility-administered medications prior to visit.       No opioid medication identified on active medication list. I have reviewed chart for other potential  high risk medication/s and harmful drug interactions in the elderly.        Aspirin is on active medication list. Aspirin use is indicated based on review of current medical condition/s. Pros and cons of this therapy have been discussed today. Benefits of this medication outweigh potential harm.  Patient has been encouraged to continue taking this medication.  .      Patient Active Problem List   Diagnosis    Allergic rhinitis    Hearing impairment    Recurrent cold sores    Encounter for screening for malignant neoplasm of colon    Erythrocytosis    FERDINAND (obstructive sleep apnea)    EDI-2 gene mutation    Polycythemia vera     Advance Care Planning   Advance Care Planning     Advance Directive is on file.  ACP discussion was held with the patient during this visit. Patient has an advance directive in EMR which is still valid.      Objective    Vitals:    12/15/23 0720   BP: 106/70   Pulse: 65   SpO2: 96%   Weight: 66.7 kg (147 lb)   Height: 168.9 cm (66.5\")     Estimated body mass index is 23.37 kg/m² as calculated from the following:    Height as of this encounter: 168.9 cm (66.5\").    Weight as of this encounter: 66.7 kg (147 lb).    BMI is within normal parameters. No other follow-up for BMI required.      Does the patient have evidence of cognitive impairment? No          HEALTH RISK ASSESSMENT    Smoking Status:  Social History     Tobacco Use   Smoking Status Never   Smokeless Tobacco Never     Alcohol Consumption:  Social History     Substance and Sexual Activity   Alcohol Use Yes    Comment: Occasionally " drink a hard cider--once per 2 months     Fall Risk Screen:    DAX Fall Risk Assessment was completed, and patient is at LOW risk for falls.Assessment completed on:12/15/2023    Depression Screenin/15/2023     7:24 AM   PHQ-2/PHQ-9 Depression Screening   Little Interest or Pleasure in Doing Things 0-->not at all   Feeling Down, Depressed or Hopeless 0-->not at all   PHQ-9: Brief Depression Severity Measure Score 0       Health Habits and Functional and Cognitive Screenin/15/2023     7:24 AM   Functional & Cognitive Status   Do you have difficulty preparing food and eating? No   Do you have difficulty bathing yourself, getting dressed or grooming yourself? No   Do you have difficulty using the toilet? No   Do you have difficulty moving around from place to place? No   Do you have trouble with steps or getting out of a bed or a chair? No   Current Diet Well Balanced Diet   Dental Exam Up to date   Eye Exam Up to date   Do you need help using the phone?  No   Are you deaf or do you have serious difficulty hearing?  No   Do you need help to go to places out of walking distance? No   Do you need help shopping? No   Do you need help preparing meals?  No   Do you need help with housework?  No   Do you need help with laundry? No   Do you need help taking your medications? No   Do you need help managing money? No   Do you ever drive or ride in a car without wearing a seat belt? No   Have you felt unusual stress, anger or loneliness in the last month? No   Who do you live with? Spouse   If you need help, do you have trouble finding someone available to you? No   Have you been bothered in the last four weeks by sexual problems? No   Do you have difficulty concentrating, remembering or making decisions? No       Age-appropriate Screening Schedule:  Refer to the list below for future screening recommendations based on patient's age, sex and/or medical conditions. Orders for these recommended tests are  "listed in the plan section. The patient has been provided with a written plan.    Health Maintenance   Topic Date Due    COVID-19 Vaccine (5 - 2023-24 season) 09/01/2023    LIPID PANEL  12/12/2023    MAMMOGRAM  02/17/2024    DXA SCAN  06/28/2024    PAP SMEAR  11/19/2024    ANNUAL WELLNESS VISIT  12/15/2024    TDAP/TD VACCINES (2 - Td or Tdap) 10/20/2027    COLORECTAL CANCER SCREENING  03/05/2028    HEPATITIS C SCREENING  Completed    INFLUENZA VACCINE  Completed    Pneumococcal Vaccine 65+  Completed    ZOSTER VACCINE  Completed                  CMS Preventative Services Quick Reference  Risk Factors Identified During Encounter  Immunizations Discussed/Encouraged: COVID19 and RSV (Respiratory Syncytial Virus)  The above risks/problems have been discussed with the patient.  Pertinent information has been shared with the patient in the After Visit Summary.  An After Visit Summary and PPPS were made available to the patient.    Follow Up:   Next Medicare Wellness visit to be scheduled in 1 year.       Additional E&M Note during same encounter follows:  Patient has multiple medical problems which are significant and separately identifiable that require additional work above and beyond the Medicare Wellness Visit.      Chief Complaint  Annual Exam  Toenail changes  Depression        Subjective        HPI  Fred Farr is also being seen today for wellcare and review of chronic issues and to address any acute needs. Patient has medically manged depression and is doing well w/ wellbutrin xl. She has toenail hypertrophy which is uncomfortable and cosmetically unattractive.          Objective   Vital Signs:  /70   Pulse 65   Ht 168.9 cm (66.5\")   Wt 66.7 kg (147 lb)   SpO2 96%   BMI 23.37 kg/m²     Physical Exam  Vitals and nursing note reviewed.   Constitutional:       Appearance: Normal appearance. She is well-developed.   HENT:      Head: Normocephalic and atraumatic.      Right Ear: Tympanic membrane and " external ear normal.      Left Ear: Tympanic membrane and external ear normal.      Nose: Nose normal.      Mouth/Throat:      Mouth: Mucous membranes are moist.   Eyes:      Extraocular Movements: Extraocular movements intact.      Pupils: Pupils are equal, round, and reactive to light.   Cardiovascular:      Rate and Rhythm: Normal rate and regular rhythm.      Pulses: Normal pulses.      Heart sounds: Normal heart sounds.   Pulmonary:      Effort: Pulmonary effort is normal. No respiratory distress.      Breath sounds: Normal breath sounds.   Abdominal:      General: Abdomen is flat.      Palpations: Abdomen is soft.   Musculoskeletal:         General: Normal range of motion.      Cervical back: Normal range of motion and neck supple.   Skin:     General: Skin is warm and dry.   Neurological:      General: No focal deficit present.      Mental Status: She is alert and oriented to person, place, and time.   Psychiatric:         Mood and Affect: Mood normal.         Behavior: Behavior normal.         Thought Content: Thought content normal.          The following data was reviewed by: Viri Caldera MD on 12/15/2023:  CMP          12/12/2023    09:37   CMP   Glucose 90    BUN 26    Creatinine 1.00    Sodium 138    Potassium 5.2    Chloride 103    Calcium 9.5    Total Protein 6.5    Albumin 4.3    Globulin 2.2    Total Bilirubin 0.3    Alkaline Phosphatase 81    AST (SGOT) 27    ALT (SGPT) 17    BUN/Creatinine Ratio 26.0      CBC w/diff          7/7/2023    07:52 10/6/2023    08:40 12/12/2023    09:37   CBC w/Diff   WBC 6.08  6.37  6.14    RBC 5.44  5.33  5.58    Hemoglobin 13.8  14.0  15.3    Hematocrit 44.5  44.8  48.7    MCV 81.8  84.1  87.3    MCH 25.4  26.3  27.4    MCHC 31.0  31.3  31.4    RDW 21.8  17.0  16.4    Platelets 342  370  406    Neutrophil Rel % 50.3  59.2  58.7    Immature Granulocyte Rel % 0.3  0.3     Lymphocyte Rel % 34.7  29.2  30.0    Monocyte Rel % 10.2  8.6  9.0    Eosinophil Rel % 3.0  1.6   1.0    Basophil Rel % 1.5  1.1  1.0        TSH          12/12/2023    09:37   TSH   TSH 1.170      UA          1/16/2023    14:37 12/12/2023    09:37   Urinalysis   Ketones, UA Negative     Blood, UA  Negative    Leukocytes, UA Small (1+)  Negative    Nitrite, UA  Negative    RBC, UA  None seen    Bacteria, UA  None seen                 Assessment and Plan   Diagnoses and all orders for this visit:    1. Healthcare maintenance (Primary)    2. Elevated LDL cholesterol level  -     Lipid Panel With LDL / HDL Ratio    3. Encounter for breast cancer screening other than mammogram    4. Encounter for screening mammogram for breast cancer  -     Mammo screening digital tomosynthesis bilateral w CAD; Future    5. Encounter for colorectal cancer screening  -     Ambulatory Referral For Screening Colonoscopy    6. Toenail deformity  -     Ambulatory Referral to Podiatry    7. Pre-ulcerative corn or callous  -     Ambulatory Referral to Podiatry    8. Allergic rhinitis, unspecified seasonality, unspecified trigger    Other orders  -     Discontinue: buPROPion XL (WELLBUTRIN XL) 150 MG 24 hr tablet; Take 1 tablet by mouth Every Morning.  Dispense: 90 tablet; Refill: 3  -     Discontinue: montelukast (SINGULAIR) 10 MG tablet; Take 1 tablet by mouth every night at bedtime.  Dispense: 90 tablet; Refill: 3  -     buPROPion XL (WELLBUTRIN XL) 150 MG 24 hr tablet; Take 1 tablet by mouth Every Morning.  Dispense: 90 tablet; Refill: 3  -     montelukast (SINGULAIR) 10 MG tablet; Take 1 tablet by mouth every night at bedtime.  Dispense: 90 tablet; Refill: 3      Patient w/ depression. She will continue wellbutrin xl at current dosing. To engage in positive activities physical and creative. She has nail hypertrophy and will be referred to podiatry for furhter evaluation and treatment of this. She has allergic rhinitis and will use singulair and nasal steroid for this. She will listed labs reviewed w/ patient. Her ldl is elevated. Low  fat nutrtion w/ routine activitiy is emphasized. Will monitor levels. Reviewed ct cardiac from 2022. F/u routinely.        I spent 45 minutes caring for Fred on this date of service. This time includes time spent by me in the following activities:preparing for the visit, reviewing tests, obtaining and/or reviewing a separately obtained history, performing a medically appropriate examination and/or evaluation , counseling and educating the patient/family/caregiver, ordering medications, tests, or procedures, referring and communicating with other health care professionals , documenting information in the medical record, and independently interpreting results and communicating that information with the patient/family/caregiver  Follow Up   No follow-ups on file.  Patient was given instructions and counseling regarding her condition or for health maintenance advice. Please see specific information pulled into the AVS if appropriate.

## 2024-01-04 NOTE — PROGRESS NOTES
"[facility] GROUP OUTPATIENT FOLLOW UP CLINIC VISIT    REASON FOR FOLLOW-UP:    JAK2 V617F mutation positive polycythemia vera  Current therapy with therapeutic phlebotomy    HISTORY OF PRESENT ILLNESS:  Fred Farr is a 68 y.o. female who returns today for follow up of the above issue.      She notes some fatigue but is active and overall doing well. She takes a nap most days of the week. No lightheadedness or syncope in spite of generally low blood pressures.     REVIEW OF SYSTEMS:  As per the HPI    PHYSICAL EXAMINATION:    Vitals:    01/05/24 0814   BP: (!) 88/53   Pulse: 73   Resp: 18   Temp: 97.8 °F (36.6 °C)   TempSrc: Temporal   SpO2: 97%   Weight: 67.4 kg (148 lb 9.6 oz)   Height: 168.9 cm (66.5\")   PainSc: 0-No pain     General:  No acute distress, awake, alert and oriented  Skin:  Warm and dry, no visible rash  HEENT:  Normocephalic/atraumatic.   Chest:  Normal respiratory effort  Extremities:  No visible clubbing, cyanosis, or edema  Neuro/psych:  Grossly nonfocal.  Normal mood and affect.       DIAGNOSTIC DATA:  CBC & Differential (01/05/2024 08:03)  Retic With IRF & RET-He (01/05/2024 08:03)    IMAGING:  None reviewed    ASSESSMENT:  This is a 68 y.o. female with:    *Polycythemia vera, JAK2 V617F mutation positive  Hgb normal until 11/12/2020 with hgb 16.0, hct 49.8%.  Hct mildly elevated since 2016 at 47-48%  She, however, states that this has been a mild and chronic issue  She does take aspirin 81 mg daily  She states that her Fitbit tells her that her oxygen levels dropped a little bit at night.  She carries no formal diagnosis of sleep apnea.  No congenital heart problems.  No significant time at high altitudes.  She does not smoke.  She denies any carbon monoxide exposure.  She was seen initially on 1/8/2021.  Hemoglobin 15.8 with hematocrit 48.3%.  Laboratory evaluation pursued.  Erythropoietin normal at 4.5.  Carbon monoxide normal at 2.0.  Ferritin normal at 99.6 with normal iron " studies.  She was referred to sleep medicine because of the erythrocytosis and because her Fitbit was telling her that her oxygen level dropped at night.  She saw Dr. Dhillon.  She was found to have mild obstructive sleep apnea by home sleep study 5/24/2021.  She has an appliance now which she is using.  This was recently replaced as her old one fractured.  JAK2 V617F mutation positive  Therapeutic phlebotomy initiated 3/28/2022 with a hematocrit of 48.3%.  Goal less than 42%.  4/29/2022: Hemoglobin improved at 15.3 with hematocrit improved to 47%.  Still above our goal of 42%.  5/27/2022: Hematocrit near goal at 42.7%.  We did not perform therapeutic phlebotomy.  For a hematocrit of 43.8% she did have phlebotomy on 7/8/2022  We have not yet initiated hydroxyurea, though this may be necessary  8/19/2022: Hemoglobin 13.4 and hematocrit at goal at 40.8%  11/18/2022: Hemoglobin higher at 14.7 with hematocrit 46.5%.  Phlebotomy required.  Other blood counts remain normal.  2/3/2023: Hematocrit 43.0%.  However, the MCV is slightly low and the reticulated hemoglobin is low and I believe she may be getting iron deficient.  Therefore, hold phlebotomy.  4/14/2023: Hemoglobin 13.2 with hematocrit 42.4%.  The MCV remains low at 77.9.  Ferritin 9.7 with an iron of 44 and 9% iron saturation  7/7/2023: Hemoglobin 13.8, hematocrit 44.5%.  Iron scratch that ferritin 13.9, iron 60 with 13% iron saturation.  Therapeutic phlebotomy not performed due to recent issues with iron deficiency  10/6/2023: Hemoglobin 14.0, hematocrit 44.8%, MCV 84.1.  Ferritin 13.2, iron 57 with 13% iron saturation.  1/5/24: hgb 14.5, hct 45.9%. iron studies pending. With stability in hct and ongoing hypotension, no phlebotomy today     *History of vaginal and perineal pruritus  Not discussed again today    *Recent iron deficiency  Likely result of phlebotomy.    10/6/2023: Ferritin 13.2, iron 57 with 13% iron saturation.  Stable.  Pending today. Reticulated  hgb is better so I think iron will be better today    *Hypotension  No syncope.  Blood pressure 88/53.  Asymptomatic.  Another reason to avoid therapeutic phlebotomy unless absolutely necessary    PLAN:    Continue aspirin  No phlebotomy again today  Follow-up in mid April with labs and an appointment for therapeutic phlebotomy if appropriate.

## 2024-01-05 ENCOUNTER — OFFICE VISIT (OUTPATIENT)
Dept: ONCOLOGY | Facility: CLINIC | Age: 69
End: 2024-01-05
Payer: MEDICARE

## 2024-01-05 ENCOUNTER — LAB (OUTPATIENT)
Dept: OTHER | Facility: HOSPITAL | Age: 69
End: 2024-01-05
Payer: MEDICARE

## 2024-01-05 ENCOUNTER — APPOINTMENT (OUTPATIENT)
Dept: ONCOLOGY | Facility: HOSPITAL | Age: 69
End: 2024-01-05
Payer: MEDICARE

## 2024-01-05 VITALS
TEMPERATURE: 97.8 F | OXYGEN SATURATION: 97 % | WEIGHT: 148.6 LBS | SYSTOLIC BLOOD PRESSURE: 88 MMHG | HEART RATE: 73 BPM | BODY MASS INDEX: 23.88 KG/M2 | HEIGHT: 66 IN | RESPIRATION RATE: 18 BRPM | DIASTOLIC BLOOD PRESSURE: 53 MMHG

## 2024-01-05 DIAGNOSIS — D45 POLYCYTHEMIA VERA: Primary | ICD-10-CM

## 2024-01-05 DIAGNOSIS — D45 POLYCYTHEMIA VERA: ICD-10-CM

## 2024-01-05 LAB
BASOPHILS # BLD AUTO: 0.09 10*3/MM3 (ref 0–0.2)
BASOPHILS NFR BLD AUTO: 1.4 % (ref 0–1.5)
DEPRECATED RDW RBC AUTO: 58 FL (ref 37–54)
EOSINOPHIL # BLD AUTO: 0.12 10*3/MM3 (ref 0–0.4)
EOSINOPHIL NFR BLD AUTO: 1.8 % (ref 0.3–6.2)
ERYTHROCYTE [DISTWIDTH] IN BLOOD BY AUTOMATED COUNT: 19 % (ref 12.3–15.4)
FERRITIN SERPL-MCNC: 16.9 NG/ML (ref 13–150)
HCT VFR BLD AUTO: 45.9 % (ref 34–46.6)
HGB BLD-MCNC: 14.5 G/DL (ref 12–15.9)
HGB RETIC QN AUTO: 30.9 PG (ref 29.8–36.1)
IMM GRANULOCYTES # BLD AUTO: 0.01 10*3/MM3 (ref 0–0.05)
IMM GRANULOCYTES NFR BLD AUTO: 0.2 % (ref 0–0.5)
IMM RETICS NFR: 10.1 % (ref 3–15.8)
IRON 24H UR-MRATE: 112 MCG/DL (ref 37–145)
IRON SATN MFR SERPL: 26 % (ref 20–50)
LYMPHOCYTES # BLD AUTO: 2.08 10*3/MM3 (ref 0.7–3.1)
LYMPHOCYTES NFR BLD AUTO: 31.7 % (ref 19.6–45.3)
MCH RBC QN AUTO: 27.5 PG (ref 26.6–33)
MCHC RBC AUTO-ENTMCNC: 31.6 G/DL (ref 31.5–35.7)
MCV RBC AUTO: 86.9 FL (ref 79–97)
MONOCYTES # BLD AUTO: 0.56 10*3/MM3 (ref 0.1–0.9)
MONOCYTES NFR BLD AUTO: 8.5 % (ref 5–12)
NEUTROPHILS NFR BLD AUTO: 3.71 10*3/MM3 (ref 1.7–7)
NEUTROPHILS NFR BLD AUTO: 56.4 % (ref 42.7–76)
NRBC BLD AUTO-RTO: 0 /100 WBC (ref 0–0.2)
PLATELET # BLD AUTO: 387 10*3/MM3 (ref 140–450)
PMV BLD AUTO: 10.6 FL (ref 6–12)
RBC # BLD AUTO: 5.28 10*6/MM3 (ref 3.77–5.28)
RETICS # AUTO: 0.07 10*6/MM3 (ref 0.02–0.13)
RETICS/RBC NFR AUTO: 1.31 % (ref 0.7–1.9)
TIBC SERPL-MCNC: 431 MCG/DL (ref 298–536)
TRANSFERRIN SERPL-MCNC: 289 MG/DL (ref 200–360)
WBC NRBC COR # BLD AUTO: 6.57 10*3/MM3 (ref 3.4–10.8)

## 2024-01-05 PROCEDURE — 83540 ASSAY OF IRON: CPT | Performed by: INTERNAL MEDICINE

## 2024-01-05 PROCEDURE — 36415 COLL VENOUS BLD VENIPUNCTURE: CPT

## 2024-01-05 PROCEDURE — 99213 OFFICE O/P EST LOW 20 MIN: CPT | Performed by: INTERNAL MEDICINE

## 2024-01-05 PROCEDURE — 85046 RETICYTE/HGB CONCENTRATE: CPT | Performed by: INTERNAL MEDICINE

## 2024-01-05 PROCEDURE — 85025 COMPLETE CBC W/AUTO DIFF WBC: CPT | Performed by: INTERNAL MEDICINE

## 2024-01-05 PROCEDURE — 84466 ASSAY OF TRANSFERRIN: CPT | Performed by: INTERNAL MEDICINE

## 2024-01-05 PROCEDURE — 82728 ASSAY OF FERRITIN: CPT | Performed by: INTERNAL MEDICINE

## 2024-01-10 ENCOUNTER — HOSPITAL ENCOUNTER (OUTPATIENT)
Dept: MAMMOGRAPHY | Facility: HOSPITAL | Age: 69
Discharge: HOME | End: 2024-01-10
Admitting: INTERNAL MEDICINE
Payer: MEDICARE

## 2024-01-10 DIAGNOSIS — Z12.31 ENCOUNTER FOR SCREENING MAMMOGRAM FOR BREAST CANCER: ICD-10-CM

## 2024-01-10 PROCEDURE — 77063 BREAST TOMOSYNTHESIS BI: CPT

## 2024-01-10 PROCEDURE — 77067 SCR MAMMO BI INCL CAD: CPT

## 2024-01-15 RX ORDER — ACYCLOVIR 50 MG/G
1 OINTMENT TOPICAL 4 TIMES DAILY
Qty: 15 G | Refills: 3 | Status: SHIPPED | OUTPATIENT
Start: 2024-01-15

## 2024-03-01 ENCOUNTER — OFFICE VISIT (OUTPATIENT)
Dept: INTERNAL MEDICINE | Facility: CLINIC | Age: 69
End: 2024-03-01
Payer: MEDICARE

## 2024-03-01 VITALS
HEART RATE: 82 BPM | DIASTOLIC BLOOD PRESSURE: 74 MMHG | SYSTOLIC BLOOD PRESSURE: 104 MMHG | OXYGEN SATURATION: 98 % | HEIGHT: 67 IN | BODY MASS INDEX: 22.91 KG/M2 | WEIGHT: 146 LBS

## 2024-03-01 DIAGNOSIS — D45 POLYCYTHEMIA VERA: ICD-10-CM

## 2024-03-01 DIAGNOSIS — G47.33 OSA (OBSTRUCTIVE SLEEP APNEA): ICD-10-CM

## 2024-03-01 DIAGNOSIS — R20.0 FACIAL NUMBNESS: Primary | ICD-10-CM

## 2024-03-01 NOTE — PROGRESS NOTES
"Chief Complaint   Patient presents with    er follow up    fever blister       History of Present Illness   Fred Farr is a 69 y.o. female presents for acute needs. Patient had a sudden onset of facial numbness left more numb than right. Noted at approx 730 am. She went to ER. Had CTA head and neck negative for any blockage.   Patient has a mandibular device for FERDINAND. She contacted her oral specialist and was advised to hold this therapy. She notes much less numbness today \"it gradually dissipated.    Patient has noticed a fairly pronounced fatigue in last several days.   Cbc, cmp, lipid, tsh, b12 levels are normal.   Bp runs hypotensive.       The following portions of the patient's history were reviewed and updated as appropriate: allergies, current medications, past family history, past medical history, past social history, past surgical history and problem list.  Current Outpatient Medications on File Prior to Visit   Medication Sig Dispense Refill    acyclovir (Zovirax) 5 % ointment Apply 1 application  topically to the appropriate area as directed 4 (Four) Times a Day. 15 g 3    albuterol sulfate  (90 Base) MCG/ACT inhaler Inhale 2 puffs Every 4 (Four) Hours As Needed for Wheezing. 18 g 0    aspirin 81 MG EC tablet Take 1 tablet by mouth Daily. 2 pills      buPROPion XL (WELLBUTRIN XL) 150 MG 24 hr tablet Take 1 tablet by mouth Every Morning. 90 tablet 3    fluticasone (FLONASE) 50 MCG/ACT nasal spray 2 sprays into the nostril(s) as directed by provider Daily.      melatonin 5 MG sublingual tablet sublingual tablet Place  under the tongue.      montelukast (SINGULAIR) 10 MG tablet Take 1 tablet by mouth every night at bedtime. 90 tablet 3    ofloxacin (Ocuflox) 0.3 % ophthalmic solution Instill 1 to 2 drops in affected eye(s) every 2 to 4 hours for the first 2 days, then instill 1 to 2 drops 4 times daily for an additional 5 days. Total treatment of 7 days. 10 mL 0    valACYclovir (VALTREX) 1000 MG " tablet TAKE 2 TABLETS BY MOUTH DAILY 30 tablet 1    ipratropium (ATROVENT) 0.06 % nasal spray USE 2 SPRAYS IN EACH NOSTRIL EVERY 6 HOURS AS NEEDED       No current facility-administered medications on file prior to visit.     Review of Systems   Constitutional: Negative.    HENT: Negative.     Eyes: Negative.    Respiratory: Negative.     Cardiovascular: Negative.    Gastrointestinal: Negative.    Endocrine: Negative.    Genitourinary: Negative.    Musculoskeletal: Negative.    Allergic/Immunologic: Negative.    Neurological: Negative.    Hematological: Negative.    Psychiatric/Behavioral: Negative.         Objective   Physical Exam  Vitals and nursing note reviewed.   Constitutional:       Appearance: Normal appearance. She is well-developed.   HENT:      Head: Normocephalic and atraumatic.      Right Ear: Tympanic membrane and external ear normal.      Left Ear: Tympanic membrane and external ear normal.      Nose: Nose normal.      Mouth/Throat:      Mouth: Mucous membranes are moist.   Eyes:      Extraocular Movements: Extraocular movements intact.      Pupils: Pupils are equal, round, and reactive to light.   Cardiovascular:      Rate and Rhythm: Normal rate and regular rhythm.      Pulses: Normal pulses.      Heart sounds: Normal heart sounds.   Pulmonary:      Effort: Pulmonary effort is normal. No respiratory distress.      Breath sounds: Normal breath sounds.   Abdominal:      General: Abdomen is flat.      Palpations: Abdomen is soft.   Musculoskeletal:         General: Normal range of motion.      Cervical back: Normal range of motion and neck supple.   Skin:     General: Skin is warm and dry.   Neurological:      General: No focal deficit present.      Mental Status: She is alert and oriented to person, place, and time.   Psychiatric:         Mood and Affect: Mood normal.         Behavior: Behavior normal.         Thought Content: Thought content normal.         Judgment: Judgment normal.          BP  "104/74   Pulse 82   Ht 168.9 cm (66.5\")   Wt 66.2 kg (146 lb)   SpO2 98%   BMI 23.21 kg/m²     Assessment & Plan   Diagnoses and all orders for this visit:    Facial numbness    Polycythemia vera    FERDINAND (obstructive sleep apnea)        Patient w transient facial numbness. Discussed with patient negative findings on CTA head and neck. She had normal lab testing as listed above. She has polycythemia but hgb/ hbt is kept at appropriate level. She has FERDINAND. Off mandibular device she has less symptoms but tolerated this device for 1-2 years prior to this event. She is not using it currently but may resume when symptoms are full resolved. Did discuss risks of untreated ferdinand.          "

## 2024-03-18 ENCOUNTER — TELEPHONE (OUTPATIENT)
Dept: ONCOLOGY | Facility: CLINIC | Age: 69
End: 2024-03-18
Payer: MEDICARE

## 2024-03-18 NOTE — TELEPHONE ENCOUNTER
"  Caller: Fred Farr \"Jatin\"    Relationship: Self    Best call back number: [phone]    What is the best time to reach you: ANY    Who are you requesting to speak with (clinical staff, provider,  specific staff member): SCHEDULING       What was the call regarding: PATIENT CALLED TO R/S APPT ON 4/12/24. JATIN ASKED FOR THE APPT TO BE \"PUSHED UP A WEEK OR PUSHED BACK TWO WEEKS\" BECAUSE SHE WILL BE OUT OF TOWN. ALSO JATIN CAN'T DO APRIL 19TH.     Is it okay if the provider responds through [employer]: YES      "

## 2024-04-19 NOTE — PROGRESS NOTES
"[facility] GROUP OUTPATIENT FOLLOW UP CLINIC VISIT    REASON FOR FOLLOW-UP:    JAK2 V617F mutation positive polycythemia vera  Current therapy with therapeutic phlebotomy    HISTORY OF PRESENT ILLNESS:  Fred Farr is a 69 y.o. female who returns today for follow up of the above issue.      She is feeling well.  No new issues.  Blood pressure is better.    REVIEW OF SYSTEMS:  As per the HPI    PHYSICAL EXAMINATION:    Vitals:    04/26/24 1508   BP: 112/68   Pulse: 87   Resp: 18   Temp: 97.7 °F (36.5 °C)   TempSrc: Temporal   SpO2: 98%   Weight: 66.1 kg (145 lb 12.8 oz)   Height: 168.9 cm (66.5\")   PainSc: 0-No pain       General:  No acute distress, awake, alert and oriented  Skin:  Warm and dry, no visible rash  HEENT:  Normocephalic/atraumatic.   Chest:  Normal respiratory effort  Extremities:  No visible clubbing, cyanosis, or edema  Neuro/psych:  Grossly nonfocal.  Normal mood and affect.       DIAGNOSTIC DATA:  CBC & Differential (04/26/2024 14:52)  Retic With IRF & RET-He (04/26/2024 14:52)  Ferritin (04/26/2024 14:52)  Iron Profile (04/26/2024 14:52)    IMAGING:  None reviewed    ASSESSMENT:  This is a 69 y.o. female with:    *Polycythemia vera, JAK2 V617F mutation positive  Hgb normal until 11/12/2020 with hgb 16.0, hct 49.8%.  Hct mildly elevated since 2016 at 47-48%  She, however, states that this has been a mild and chronic issue  She does take aspirin 81 mg daily  She states that her Fitbit tells her that her oxygen levels dropped a little bit at night.  She carries no formal diagnosis of sleep apnea.  No congenital heart problems.  No significant time at high altitudes.  She does not smoke.  She denies any carbon monoxide exposure.  She was seen initially on 1/8/2021.  Hemoglobin 15.8 with hematocrit 48.3%.  Laboratory evaluation pursued.  Erythropoietin normal at 4.5.  Carbon monoxide normal at 2.0.  Ferritin normal at 99.6 with normal iron studies.  She was referred to sleep medicine because " of the erythrocytosis and because her Fitbit was telling her that her oxygen level dropped at night.  She saw Dr. Dhillon.  She was found to have mild obstructive sleep apnea by home sleep study 5/24/2021.  She has an appliance now which she is using.  This was recently replaced as her old one fractured.  JAK2 V617F mutation positive  Therapeutic phlebotomy initiated 3/28/2022 with a hematocrit of 48.3%.  Goal less than 42%.  4/29/2022: Hemoglobin improved at 15.3 with hematocrit improved to 47%.  Still above our goal of 42%.  5/27/2022: Hematocrit near goal at 42.7%.  We did not perform therapeutic phlebotomy.  For a hematocrit of 43.8% she did have phlebotomy on 7/8/2022  We have not yet initiated hydroxyurea, though this may be necessary  8/19/2022: Hemoglobin 13.4 and hematocrit at goal at 40.8%  11/18/2022: Hemoglobin higher at 14.7 with hematocrit 46.5%.  Phlebotomy required.  Other blood counts remain normal.  2/3/2023: Hematocrit 43.0%.  However, the MCV is slightly low and the reticulated hemoglobin is low and I believe she may be getting iron deficient.  Therefore, hold phlebotomy.  4/14/2023: Hemoglobin 13.2 with hematocrit 42.4%.  The MCV remains low at 77.9.  Ferritin 9.7 with an iron of 44 and 9% iron saturation  7/7/2023: Hemoglobin 13.8, hematocrit 44.5%.  Iron scratch that ferritin 13.9, iron 60 with 13% iron saturation.  Therapeutic phlebotomy not performed due to recent issues with iron deficiency  10/6/2023: Hemoglobin 14.0, hematocrit 44.8%, MCV 84.1.  Ferritin 13.2, iron 57 with 13% iron saturation.  1/5/24: Hemoglobin 14.5, hematocrit 45.9%. iron studies pending. With stability in hematocrit and ongoing hypotension, no phlebotomy today  4/26/2024: Hemoglobin stable at 14.2, hematocrit 44.1%    *Recent iron deficiency  Likely result of phlebotomy.    10/6/2023: Ferritin 13.2, iron 57 with 13% iron saturation.  Stable.  1/5/2024: Ferritin somewhat improved at 16.9, iron much better at 112.     4/26/2024: Ferritin normal at 40.8, iron 87    *Hypotension  No syncope.  Blood pressure 112/68 today.  Asymptomatic.    PLAN:  Continue aspirin, now at 81 mg daily  No phlebotomy again today as her hemoglobin and hematocrit are stable  Follow-up in 3 to 4 months with labs and an appointment for therapeutic phlebotomy if appropriate.

## 2024-04-26 ENCOUNTER — LAB (OUTPATIENT)
Dept: OTHER | Facility: HOSPITAL | Age: 69
End: 2024-04-26
Payer: MEDICARE

## 2024-04-26 ENCOUNTER — OFFICE VISIT (OUTPATIENT)
Dept: ONCOLOGY | Facility: CLINIC | Age: 69
End: 2024-04-26
Payer: MEDICARE

## 2024-04-26 ENCOUNTER — APPOINTMENT (OUTPATIENT)
Dept: ONCOLOGY | Facility: HOSPITAL | Age: 69
End: 2024-04-26
Payer: MEDICARE

## 2024-04-26 VITALS
TEMPERATURE: 97.7 F | HEIGHT: 66 IN | WEIGHT: 145.8 LBS | SYSTOLIC BLOOD PRESSURE: 112 MMHG | BODY MASS INDEX: 23.43 KG/M2 | HEART RATE: 87 BPM | OXYGEN SATURATION: 98 % | RESPIRATION RATE: 18 BRPM | DIASTOLIC BLOOD PRESSURE: 68 MMHG

## 2024-04-26 DIAGNOSIS — D45 POLYCYTHEMIA VERA: Primary | ICD-10-CM

## 2024-04-26 DIAGNOSIS — D45 POLYCYTHEMIA VERA: ICD-10-CM

## 2024-04-26 LAB
BASOPHILS # BLD AUTO: 0.08 10*3/MM3 (ref 0–0.2)
BASOPHILS NFR BLD AUTO: 1 % (ref 0–1.5)
DEPRECATED RDW RBC AUTO: 53.9 FL (ref 37–54)
EOSINOPHIL # BLD AUTO: 0.1 10*3/MM3 (ref 0–0.4)
EOSINOPHIL NFR BLD AUTO: 1.2 % (ref 0.3–6.2)
ERYTHROCYTE [DISTWIDTH] IN BLOOD BY AUTOMATED COUNT: 17 % (ref 12.3–15.4)
FERRITIN SERPL-MCNC: 40.8 NG/ML (ref 13–150)
HCT VFR BLD AUTO: 44.1 % (ref 34–46.6)
HGB BLD-MCNC: 14.2 G/DL (ref 12–15.9)
HGB RETIC QN AUTO: 32 PG (ref 29.8–36.1)
IMM GRANULOCYTES # BLD AUTO: 0.04 10*3/MM3 (ref 0–0.05)
IMM GRANULOCYTES NFR BLD AUTO: 0.5 % (ref 0–0.5)
IMM RETICS NFR: 7.7 % (ref 3–15.8)
IRON 24H UR-MRATE: 87 MCG/DL (ref 37–145)
IRON SATN MFR SERPL: 24 % (ref 20–50)
LYMPHOCYTES # BLD AUTO: 2.22 10*3/MM3 (ref 0.7–3.1)
LYMPHOCYTES NFR BLD AUTO: 27.4 % (ref 19.6–45.3)
MCH RBC QN AUTO: 28.5 PG (ref 26.6–33)
MCHC RBC AUTO-ENTMCNC: 32.2 G/DL (ref 31.5–35.7)
MCV RBC AUTO: 88.4 FL (ref 79–97)
MONOCYTES # BLD AUTO: 0.62 10*3/MM3 (ref 0.1–0.9)
MONOCYTES NFR BLD AUTO: 7.7 % (ref 5–12)
NEUTROPHILS NFR BLD AUTO: 5.03 10*3/MM3 (ref 1.7–7)
NEUTROPHILS NFR BLD AUTO: 62.2 % (ref 42.7–76)
NRBC BLD AUTO-RTO: 0 /100 WBC (ref 0–0.2)
PLATELET # BLD AUTO: 323 10*3/MM3 (ref 140–450)
PMV BLD AUTO: 10.5 FL (ref 6–12)
RBC # BLD AUTO: 4.99 10*6/MM3 (ref 3.77–5.28)
RETICS # AUTO: 0.06 10*6/MM3 (ref 0.02–0.13)
RETICS/RBC NFR AUTO: 1.25 % (ref 0.7–1.9)
TIBC SERPL-MCNC: 367 MCG/DL (ref 298–536)
TRANSFERRIN SERPL-MCNC: 246 MG/DL (ref 200–360)
WBC NRBC COR # BLD AUTO: 8.09 10*3/MM3 (ref 3.4–10.8)

## 2024-04-26 PROCEDURE — 36415 COLL VENOUS BLD VENIPUNCTURE: CPT

## 2024-04-26 PROCEDURE — 85046 RETICYTE/HGB CONCENTRATE: CPT | Performed by: INTERNAL MEDICINE

## 2024-04-26 PROCEDURE — 82728 ASSAY OF FERRITIN: CPT | Performed by: INTERNAL MEDICINE

## 2024-04-26 PROCEDURE — 99213 OFFICE O/P EST LOW 20 MIN: CPT | Performed by: INTERNAL MEDICINE

## 2024-04-26 PROCEDURE — 1159F MED LIST DOCD IN RCRD: CPT | Performed by: INTERNAL MEDICINE

## 2024-04-26 PROCEDURE — 1160F RVW MEDS BY RX/DR IN RCRD: CPT | Performed by: INTERNAL MEDICINE

## 2024-04-26 PROCEDURE — 85025 COMPLETE CBC W/AUTO DIFF WBC: CPT | Performed by: INTERNAL MEDICINE

## 2024-04-26 PROCEDURE — 84466 ASSAY OF TRANSFERRIN: CPT | Performed by: INTERNAL MEDICINE

## 2024-04-26 PROCEDURE — 83540 ASSAY OF IRON: CPT | Performed by: INTERNAL MEDICINE

## 2024-04-26 PROCEDURE — 1126F AMNT PAIN NOTED NONE PRSNT: CPT | Performed by: INTERNAL MEDICINE

## 2024-05-18 ENCOUNTER — TELEMEDICINE (OUTPATIENT)
Dept: FAMILY MEDICINE CLINIC | Facility: TELEHEALTH | Age: 69
End: 2024-05-18
Payer: MEDICARE

## 2024-05-18 DIAGNOSIS — H10.9 BACTERIAL CONJUNCTIVITIS OF RIGHT EYE: Primary | ICD-10-CM

## 2024-05-18 PROBLEM — R20.2 PARESTHESIA OF LEFT ARM AND LEG: Status: ACTIVE | Noted: 2024-02-27

## 2024-05-18 PROCEDURE — 1160F RVW MEDS BY RX/DR IN RCRD: CPT | Performed by: NURSE PRACTITIONER

## 2024-05-18 PROCEDURE — 1159F MED LIST DOCD IN RCRD: CPT | Performed by: NURSE PRACTITIONER

## 2024-05-18 PROCEDURE — 99213 OFFICE O/P EST LOW 20 MIN: CPT | Performed by: NURSE PRACTITIONER

## 2024-05-18 RX ORDER — OFLOXACIN 3 MG/ML
SOLUTION/ DROPS OPHTHALMIC
Qty: 10 ML | Refills: 0 | Status: SHIPPED | OUTPATIENT
Start: 2024-05-18

## 2024-05-18 NOTE — PROGRESS NOTES
You have chosen to receive care through a telehealth visit.  Do you consent to use a video/audio connection for your medical care today? Yes     CHIEF COMPLAINT  Chief Complaint   Patient presents with    Eye Drainage         HPI  Fred Farr is a 69 y.o. female  presents with complaint of redness and drainage from the left eye. Reports she has a history of blepharitis and washes her eyelids twice a day. Reports overnight symptoms started. Reports she is having some greenish yellow drainage. Reports the right eye is having yellowish green drainage. The red eye is starting to get red as well. Reports some mild itching. No fever or chills. Felt some blurriness that resolves after washing the eye. There is yellow crusting on the lashes. Reports she has not used any medication for her symptoms. Reports she has had this in the past. Reports ofloxacin worked well in the past. Patient wears glasses doesn't wear contacts.     Review of Systems   Constitutional:  Negative for chills, fatigue and fever.   HENT:  Negative for congestion, ear discharge, ear pain, sinus pressure, sinus pain and sore throat.    Eyes:  Positive for discharge, redness and itching. Negative for photophobia, pain and visual disturbance.   Respiratory:  Negative for cough, chest tightness, shortness of breath and wheezing.    Cardiovascular:  Negative for chest pain.   Gastrointestinal:  Negative for abdominal pain, diarrhea, nausea and vomiting.   Musculoskeletal:  Negative for back pain and myalgias.   Neurological:  Negative for dizziness and headaches.   Psychiatric/Behavioral: Negative.         Past Medical History:   Diagnosis Date    Allergic 2013    Recently tested--mild allergies    Anxiety     Depression     Encounter for screening for malignant neoplasm of colon 01/18/2018    HL (hearing loss) 2000    Low blood pressure     FERDINAND (obstructive sleep apnea) 05/24/2021    Home sleep study.  Mild FERDINAND with AHI 8 events per hour.  No  sleep-related hypoxia.  The patient snored 2.5% of total monitoring time.    Right bundle branch block     Skin cancer        Family History   Problem Relation Age of Onset    Heart disease Mother     Arthritis Mother     Cancer Father         Colon cancer    Macular degeneration Father     Vision loss Father     Asthma Sister     Alcohol abuse Sister     Vision loss Sister     Hypothyroidism Brother     Macular degeneration Brother     Thyroid disease Brother     Cancer Brother         Skin cancer    Stroke Paternal Grandfather     Breast cancer Cousin     Ovarian cancer Cousin     Stroke Maternal Grandfather     Heart disease Brother     Vision loss Brother     Malig Hyperthermia Neg Hx        Social History     Socioeconomic History    Marital status:      Spouse name: Rodney   Tobacco Use    Smoking status: Never    Smokeless tobacco: Never   Substance and Sexual Activity    Alcohol use: Yes     Comment: Occasionally drink a hard cider--once per 2 months    Drug use: No    Sexual activity: Yes     Partners: Male     Birth control/protection: Post-menopausal       Fred Farr  reports that she has never smoked. She has never used smokeless tobacco. I have educated her on the risk of diseases from using tobacco products such as cancer, COPD, and heart disease.         I spent  1  minutes counseling the patient.              There were no vitals taken for this visit.    PHYSICAL EXAM  Physical Exam   Constitutional: She is oriented to person, place, and time. She appears well-developed and well-nourished. No distress.   HENT:   Head: Normocephalic and atraumatic.   Right Ear: Hearing normal.   Left Ear: Hearing normal.   Nose: No congestion.   Mouth/Throat: Mouth/Lips are normal.  Eyes: Conjunctivae and lids are normal. Right eye exhibits discharge and edema. No foreign body present in the right eye. Left eye exhibits no discharge and no edema. No foreign body present in the left eye. Right conjunctiva is  injected. Left conjunctiva is injected.   Patient directed exam  The right eye is red with yellow green drainage.  No foreign body noted with the naked eye  Mild swelling in right upper lid  Left eye mildly red   Pulmonary/Chest: Effort normal.  No respiratory distress.  Neurological: She is alert and oriented to person, place, and time.   Psychiatric: She has a normal mood and affect. Her speech is normal and behavior is normal.       Results for orders placed or performed in visit on 04/26/24   Iron Profile    Specimen: Blood   Result Value Ref Range    Iron 87 37 - 145 mcg/dL    Iron Saturation (TSAT) 24 20 - 50 %    Transferrin 246 200 - 360 mg/dL    TIBC 367 298 - 536 mcg/dL   Ferritin    Specimen: Blood   Result Value Ref Range    Ferritin 40.80 13.00 - 150.00 ng/mL   Retic With IRF & RET-He    Specimen: Blood   Result Value Ref Range    Immature Reticulocyte Fraction 7.7 3.0 - 15.8 %    Reticulocyte % 1.25 0.70 - 1.90 %    Reticulocyte Absolute 0.0624 0.0200 - 0.1300 10*6/mm3    Reticulocyte Hgb 32.0 29.8 - 36.1 pg   CBC Auto Differential    Specimen: Blood   Result Value Ref Range    WBC 8.09 3.40 - 10.80 10*3/mm3    RBC 4.99 3.77 - 5.28 10*6/mm3    Hemoglobin 14.2 12.0 - 15.9 g/dL    Hematocrit 44.1 34.0 - 46.6 %    MCV 88.4 79.0 - 97.0 fL    MCH 28.5 26.6 - 33.0 pg    MCHC 32.2 31.5 - 35.7 g/dL    RDW 17.0 (H) 12.3 - 15.4 %    RDW-SD 53.9 37.0 - 54.0 fl    MPV 10.5 6.0 - 12.0 fL    Platelets 323 140 - 450 10*3/mm3    Neutrophil % 62.2 42.7 - 76.0 %    Lymphocyte % 27.4 19.6 - 45.3 %    Monocyte % 7.7 5.0 - 12.0 %    Eosinophil % 1.2 0.3 - 6.2 %    Basophil % 1.0 0.0 - 1.5 %    Immature Grans % 0.5 0.0 - 0.5 %    Neutrophils, Absolute 5.03 1.70 - 7.00 10*3/mm3    Lymphocytes, Absolute 2.22 0.70 - 3.10 10*3/mm3    Monocytes, Absolute 0.62 0.10 - 0.90 10*3/mm3    Eosinophils, Absolute 0.10 0.00 - 0.40 10*3/mm3    Basophils, Absolute 0.08 0.00 - 0.20 10*3/mm3    Immature Grans, Absolute 0.04 0.00 - 0.05  10*3/mm3    nRBC 0.0 0.0 - 0.2 /100 WBC       Diagnoses and all orders for this visit:    1. Bacterial conjunctivitis of right eye (Primary)    Other orders  -     ofloxacin (Ocuflox) 0.3 % ophthalmic solution; Instill 1 to 2 drops in affected eyes every 2-4 hours for the first 2 days, then instill 1-2 drops 4 times a day for additional 5 days. Total treatment of 7 days  Dispense: 10 mL; Refill: 0    Do not wear any eye makeup while being treated  If symptoms do not improve in the next 2 days see your PCP or eye doctor  If symptoms worsen such as high fever, eye pain or visual disturbance go to the ER        FOLLOW-UP  As discussed during visit with PCP/Virtual Care if no improvement or Urgent Care/Emergency Department if worsening of symptoms    Patient verbalizes understanding of medication dosage, comfort measures, instructions for treatment and follow-up.    Leonora Nolen, JORDI  05/18/2024  06:10 EDT    The use of a video visit has been reviewed with the patient and verbal informed consent has been obtained. Myself and Fred Farr participated in this visit. The patient is located in [address] [place] [address].    I am located in [place], [place]. [employer] and [employer] were utilized. I spent 5 minutes in the patient's chart for this visit.      Note Disclaimer: At [facility], we believe that sharing information builds trust and better   relationships. You are receiving this note because you recently visited [facility]. It is possible you   will see health information before a provider has talked with you about it. This kind of information can   be easy to misunderstand. To help you fully understand what it means for your health, we urge you to   discuss this note with your provider.

## 2024-06-12 ENCOUNTER — PREP FOR SURGERY (OUTPATIENT)
Dept: OTHER | Facility: HOSPITAL | Age: 69
End: 2024-06-12
Payer: MEDICARE

## 2024-06-12 DIAGNOSIS — Z80.0 FAMILY HISTORY OF COLON CANCER IN FATHER: ICD-10-CM

## 2024-06-12 DIAGNOSIS — Z12.11 SCREENING FOR COLON CANCER: Primary | ICD-10-CM

## 2024-06-19 PROBLEM — Z12.11 SCREENING FOR COLON CANCER: Status: ACTIVE | Noted: 2024-06-12

## 2024-06-19 PROBLEM — Z80.0 FAMILY HISTORY OF COLON CANCER IN FATHER: Status: ACTIVE | Noted: 2024-06-12

## 2024-07-31 RX ORDER — ERYTHROMYCIN 5 MG/G
OINTMENT OPHTHALMIC NIGHTLY
COMMUNITY

## 2024-08-01 ENCOUNTER — ANESTHESIA (OUTPATIENT)
Dept: GASTROENTEROLOGY | Facility: HOSPITAL | Age: 69
End: 2024-08-01
Payer: MEDICARE

## 2024-08-01 ENCOUNTER — HOSPITAL ENCOUNTER (OUTPATIENT)
Facility: HOSPITAL | Age: 69
Setting detail: HOSPITAL OUTPATIENT SURGERY
Discharge: HOME | End: 2024-08-01
Attending: SURGERY | Admitting: SURGERY
Payer: MEDICARE

## 2024-08-01 ENCOUNTER — ANESTHESIA EVENT (OUTPATIENT)
Dept: GASTROENTEROLOGY | Facility: HOSPITAL | Age: 69
End: 2024-08-01
Payer: MEDICARE

## 2024-08-01 VITALS
HEIGHT: 67 IN | HEART RATE: 64 BPM | SYSTOLIC BLOOD PRESSURE: 106 MMHG | DIASTOLIC BLOOD PRESSURE: 66 MMHG | WEIGHT: 144.3 LBS | RESPIRATION RATE: 16 BRPM | BODY MASS INDEX: 22.65 KG/M2 | OXYGEN SATURATION: 100 %

## 2024-08-01 DIAGNOSIS — Z12.11 SCREENING FOR COLON CANCER: ICD-10-CM

## 2024-08-01 DIAGNOSIS — Z80.0 FAMILY HISTORY OF COLON CANCER IN FATHER: ICD-10-CM

## 2024-08-01 PROBLEM — K57.90 DIVERTICULOSIS: Status: ACTIVE | Noted: 2024-08-01

## 2024-08-01 PROBLEM — K63.5 COLON POLYPS: Status: ACTIVE | Noted: 2024-08-01

## 2024-08-01 PROCEDURE — S0260 H&P FOR SURGERY: HCPCS | Performed by: SURGERY

## 2024-08-01 PROCEDURE — 25010000002 PROPOFOL 10 MG/ML EMULSION: Performed by: NURSE ANESTHETIST, CERTIFIED REGISTERED

## 2024-08-01 PROCEDURE — 88305 TISSUE EXAM BY PATHOLOGIST: CPT | Performed by: SURGERY

## 2024-08-01 PROCEDURE — 45380 COLONOSCOPY AND BIOPSY: CPT | Performed by: SURGERY

## 2024-08-01 PROCEDURE — 25810000003 LACTATED RINGERS PER 1000 ML: Performed by: SURGERY

## 2024-08-01 PROCEDURE — 25010000002 PHENYLEPHRINE 10 MG/ML SOLUTION: Performed by: NURSE ANESTHETIST, CERTIFIED REGISTERED

## 2024-08-01 RX ORDER — FAMOTIDINE 20 MG
1 TABLET ORAL DAILY
COMMUNITY

## 2024-08-01 RX ORDER — SODIUM CHLORIDE 0.9 % (FLUSH) 0.9 %
10 SYRINGE (ML) INJECTION AS NEEDED
Status: DISCONTINUED | OUTPATIENT
Start: 2024-08-01 | End: 2024-08-01 | Stop reason: HOSPADM

## 2024-08-01 RX ORDER — SODIUM CHLORIDE, SODIUM LACTATE, POTASSIUM CHLORIDE, CALCIUM CHLORIDE 600; 310; 30; 20 MG/100ML; MG/100ML; MG/100ML; MG/100ML
30 INJECTION, SOLUTION INTRAVENOUS CONTINUOUS PRN
Status: DISCONTINUED | OUTPATIENT
Start: 2024-08-01 | End: 2024-08-01 | Stop reason: HOSPADM

## 2024-08-01 RX ORDER — SODIUM CHLORIDE 9 MG/ML
40 INJECTION, SOLUTION INTRAVENOUS AS NEEDED
Status: DISCONTINUED | OUTPATIENT
Start: 2024-08-01 | End: 2024-08-01 | Stop reason: HOSPADM

## 2024-08-01 RX ORDER — PROPOFOL 10 MG/ML
VIAL (ML) INTRAVENOUS AS NEEDED
Status: DISCONTINUED | OUTPATIENT
Start: 2024-08-01 | End: 2024-08-01 | Stop reason: SURG

## 2024-08-01 RX ORDER — PHENYLEPHRINE HYDROCHLORIDE 10 MG/ML
INJECTION INTRAVENOUS AS NEEDED
Status: DISCONTINUED | OUTPATIENT
Start: 2024-08-01 | End: 2024-08-01 | Stop reason: SURG

## 2024-08-01 RX ORDER — SODIUM CHLORIDE 0.9 % (FLUSH) 0.9 %
10 SYRINGE (ML) INJECTION EVERY 12 HOURS SCHEDULED
Status: DISCONTINUED | OUTPATIENT
Start: 2024-08-01 | End: 2024-08-01 | Stop reason: HOSPADM

## 2024-08-01 RX ADMIN — PHENYLEPHRINE HYDROCHLORIDE 100 MCG: 10 INJECTION INTRAVENOUS at 10:32

## 2024-08-01 RX ADMIN — PROPOFOL 200 MCG/KG/MIN: 10 INJECTION, EMULSION INTRAVENOUS at 10:16

## 2024-08-01 RX ADMIN — PROPOFOL 100 MG: 10 INJECTION, EMULSION INTRAVENOUS at 10:15

## 2024-08-01 RX ADMIN — SODIUM CHLORIDE, POTASSIUM CHLORIDE, SODIUM LACTATE AND CALCIUM CHLORIDE 30 ML/HR: 600; 310; 30; 20 INJECTION, SOLUTION INTRAVENOUS at 09:40

## 2024-08-01 NOTE — H&P
General Surgery  History and Physical    CC: Screening for colon cancer    HPI: The patient is a pleasant 69 y.o. year-old lady who presents today for repeat screening colonoscopy.  Her last colonoscopy was done in 2018 by Dr. Danette Shabazz and significant for diverticulosis.  She denies any melena or hematochezia.  She has a family history of colon cancer in her father and her nephew.    Past Medical History:   Anxiety with depression  History of low blood pressure  Obstructive sleep apnea    Past Surgical History:   Colonoscopy (2018, Dr. Shabazz-diverticulosis)  Rhinoplasty x 2  Oophorectomy    Medications:   Medications Prior to Admission   Medication Sig Dispense Refill Last Dose    albuterol sulfate  (90 Base) MCG/ACT inhaler Inhale 2 puffs Every 4 (Four) Hours As Needed for Wheezing. 18 g 0     aspirin 81 MG EC tablet Take 1 tablet by mouth Daily. 2 pills       buPROPion XL (WELLBUTRIN XL) 150 MG 24 hr tablet Take 1 tablet by mouth Every Morning. 90 tablet 3     fluticasone (FLONASE) 50 MCG/ACT nasal spray 2 sprays into the nostril(s) as directed by provider Daily.       ipratropium (ATROVENT) 0.06 % nasal spray USE 2 SPRAYS IN EACH NOSTRIL EVERY 6 HOURS AS NEEDED       melatonin 5 MG sublingual tablet sublingual tablet Place  under the tongue.       montelukast (SINGULAIR) 10 MG tablet Take 1 tablet by mouth every night at bedtime. 90 tablet 3     ofloxacin (Ocuflox) 0.3 % ophthalmic solution Instill 1 to 2 drops in affected eyes every 2-4 hours for the first 2 days, then instill 1-2 drops 4 times a day for additional 5 days. Total treatment of 7 days 10 mL 0     valACYclovir (VALTREX) 1000 MG tablet TAKE 2 TABLETS BY MOUTH DAILY 30 tablet 1     acyclovir (Zovirax) 5 % ointment Apply 1 application  topically to the appropriate area as directed 4 (Four) Times a Day. (Patient not taking: Reported on 5/18/2024) 15 g 3     AZITHROMYCIN OP Apply  to eye(s) as directed by provider.       erythromycin  (ROMYCIN) 5 MG/GM ophthalmic ointment Administer  to both eyes Every Night.          Allergies: Multiple skin allergy test serums (dust mite, corn, Aspergillus, etc.)    Family History: Father with history of colon cancer, nephew with history of colon cancer, cousin with history of ovarian cancer and breast cancer    Social History: , non-smoker, occasional alcohol use socially    ROS: A comprehensive review of systems was conducted and negative for melena or hematochezia  All other systems reviewed and negative    Physical Exam:  Vitals:    08/01/24 0932   BP: 119/71   Pulse: 64   Resp: 20   SpO2: 99%   Height: 168 cm  Weight: 65 kg  BMI: 22.9  General: No acute distress, well-nourished & well-developed  HEAD: normocephalic, atraumatic  EYES: normal conjunctiva, sclera anicteric  EARS: grossly normal hearing  NECK: supple, no thyromegaly  CARDIOVASCULAR: regular rate and rhythm  RESPIRATORY: clear to auscultation bilaterally  GASTROINTESTINAL: soft, nontender, non-distended  PSYCHIATRIC: oriented x3, normal mood and affect    BMI is within normal parameters. No other follow-up for BMI required.      ASSESSMENT & PLAN  Mrs. Farr is a 69-year-old lady who presents today for repeat screening colonoscopy.  She has a family history of colon cancer in her father and her nephew.  She has been counseled on the risks of the procedure to include bleeding, colon perforation, and missed pathology.  Despite these risks, she has consented to proceed.    Rosa Lauren MD  General, Robotic, and Endoscopic Surgery  [facility]    [address], [address]  [place] [address]  P: [phone]  F: [phone]

## 2024-08-01 NOTE — ANESTHESIA PREPROCEDURE EVALUATION
Anesthesia Evaluation     Patient summary reviewed and Nursing notes reviewed                Airway   Mallampati: III  TM distance: <3 FB  Small opening and Possible difficult intubation  Dental      Pulmonary    (+) ,sleep apnea  Cardiovascular     ECG reviewed  Rhythm: regular  Rate: normal    (+) dysrhythmias (RBBB)      Neuro/Psych  (+) psychiatric history Anxiety and Depression  GI/Hepatic/Renal/Endo - negative ROS     Musculoskeletal (-) negative ROS    Abdominal    Substance History   (+) alcohol use     OB/GYN negative ob/gyn ROS         Other   blood dyscrasia (MPN),   history of cancer (skin)                Anesthesia Plan    ASA 2     MAC     (Tends to run low BP's  "[employer]  intravenous induction     Anesthetic plan, risks, benefits, and alternatives have been provided, discussed and informed consent has been obtained with: patient.    CODE STATUS:

## 2024-08-01 NOTE — ANESTHESIA POSTPROCEDURE EVALUATION
Patient: Fred Farr    Procedure Summary       Date: 08/01/24 Room / Location: [facility] ENDOSCOPY 6 / [facility] ENDOSCOPY    Anesthesia Start: 1010 Anesthesia Stop: 1046    Procedure: COLONOSCOPY to cecum with polypectomy Diagnosis:       Screening for colon cancer      Family history of colon cancer in father      (Screening for colon cancer [Z12.11])      (Family history of colon cancer in father [Z80.0])    Surgeons: Rosa Lauren MD Provider: Yuriy Lucio MD    Anesthesia Type: MAC ASA Status: 2            Anesthesia Type: MAC    Vitals  Vitals Value Taken Time   /66 08/01/24 1108   Temp     Pulse 63 08/01/24 1110   Resp 16 08/01/24 1107   SpO2 100 % 08/01/24 1110   Vitals shown include unfiled device data.        Post Anesthesia Care and Evaluation    Patient location during evaluation: PACU  Patient participation: complete - patient participated  Level of consciousness: awake and alert  Pain management: adequate    Airway patency: patent  Anesthetic complications: No anesthetic complications    Cardiovascular status: acceptable  Respiratory status: acceptable  Hydration status: acceptable    Comments: --------------------            08/01/24               1107     --------------------   BP:       106/66     Pulse:      64       Resp:       16       SpO2:      100%     --------------------

## 2024-08-02 ENCOUNTER — TELEPHONE (OUTPATIENT)
Dept: SURGERY | Facility: CLINIC | Age: 69
End: 2024-08-02
Payer: MEDICARE

## 2024-08-02 LAB
LAB AP CASE REPORT: NORMAL
PATH REPORT.FINAL DX SPEC: NORMAL
PATH REPORT.GROSS SPEC: NORMAL

## 2024-08-02 NOTE — TELEPHONE ENCOUNTER
I called Una and left a voicemail on her cell phone regarding the benign pathology findings from colonoscopy yesterday.  The 1 polyp returned as a hyperplastic polyp/lymphoid aggregate which shows no precancerous potential to it.  Given her family history of colon cancer in her father and nephew, I would recommend she needs to have another screening colonoscopy in 5 years.    Ely, please update this patient's health maintenance tab and recall pool to reflect a 5-year interval.    Thanks,  LOW

## 2024-09-03 RX ORDER — VALACYCLOVIR HYDROCHLORIDE 1 G/1
2000 TABLET, FILM COATED ORAL AS NEEDED
Qty: 12 TABLET | Refills: 1 | Status: SHIPPED | OUTPATIENT
Start: 2024-09-03

## 2024-09-16 ENCOUNTER — TELEPHONE (OUTPATIENT)
Dept: ONCOLOGY | Facility: CLINIC | Age: 69
End: 2024-09-16

## 2024-09-16 RX ORDER — BENZONATATE 100 MG/1
100 CAPSULE ORAL 3 TIMES DAILY PRN
Qty: 30 CAPSULE | Refills: 1 | Status: SHIPPED | OUTPATIENT
Start: 2024-09-16 | End: 2024-09-20

## 2024-09-16 NOTE — TELEPHONE ENCOUNTER
"Caller: Fred Farr \"JATIN\"    Relationship to patient: Self    Best call back number: [phone]    Chief complaint: PT IS STILL SICK    Type of visit: FU1, INFUSION    Requested date: 9/27     If rescheduling, when is the original appointment: 9/20     "

## 2024-09-17 ENCOUNTER — TELEPHONE (OUTPATIENT)
Dept: INTERNAL MEDICINE | Facility: CLINIC | Age: 69
End: 2024-09-17
Payer: MEDICARE

## 2024-09-19 RX ORDER — BENZONATATE 100 MG/1
100 CAPSULE ORAL 3 TIMES DAILY PRN
Qty: 30 CAPSULE | Refills: 1 | OUTPATIENT
Start: 2024-09-19

## 2024-09-20 ENCOUNTER — OFFICE VISIT (OUTPATIENT)
Dept: INTERNAL MEDICINE | Facility: CLINIC | Age: 69
End: 2024-09-20
Payer: MEDICARE

## 2024-09-20 VITALS
WEIGHT: 144 LBS | BODY MASS INDEX: 22.6 KG/M2 | DIASTOLIC BLOOD PRESSURE: 58 MMHG | OXYGEN SATURATION: 96 % | HEART RATE: 80 BPM | HEIGHT: 67 IN | SYSTOLIC BLOOD PRESSURE: 100 MMHG

## 2024-09-20 DIAGNOSIS — J40 BRONCHITIS: Primary | ICD-10-CM

## 2024-09-20 DIAGNOSIS — J01.00 ACUTE NON-RECURRENT MAXILLARY SINUSITIS: ICD-10-CM

## 2024-09-20 PROCEDURE — 1126F AMNT PAIN NOTED NONE PRSNT: CPT | Performed by: INTERNAL MEDICINE

## 2024-09-20 PROCEDURE — 1160F RVW MEDS BY RX/DR IN RCRD: CPT | Performed by: INTERNAL MEDICINE

## 2024-09-20 PROCEDURE — 1159F MED LIST DOCD IN RCRD: CPT | Performed by: INTERNAL MEDICINE

## 2024-09-20 PROCEDURE — 99213 OFFICE O/P EST LOW 20 MIN: CPT | Performed by: INTERNAL MEDICINE

## 2024-09-20 RX ORDER — AZITHROMYCIN 250 MG/1
TABLET, FILM COATED ORAL
Qty: 6 TABLET | Refills: 0 | Status: SHIPPED | OUTPATIENT
Start: 2024-09-20

## 2024-09-20 RX ORDER — BENZONATATE 100 MG/1
100 CAPSULE ORAL 3 TIMES DAILY PRN
Qty: 30 CAPSULE | Refills: 3 | Status: SHIPPED | OUTPATIENT
Start: 2024-09-20

## 2024-09-20 RX ORDER — IPRATROPIUM BROMIDE 42 UG/1
2 SPRAY, METERED NASAL 3 TIMES DAILY
Qty: 15 ML | Refills: 12 | Status: SHIPPED | OUTPATIENT
Start: 2024-09-20

## 2024-09-20 RX ORDER — ALBUTEROL SULFATE 90 UG/1
2 AEROSOL, METERED RESPIRATORY (INHALATION) EVERY 4 HOURS PRN
Qty: 18 G | Refills: 1 | Status: SHIPPED | OUTPATIENT
Start: 2024-09-20

## 2024-10-03 NOTE — PROGRESS NOTES
"[facility] GROUP OUTPATIENT FOLLOW UP CLINIC VISIT    REASON FOR FOLLOW-UP:    JAK2 V617F mutation positive polycythemia vera  Current therapy with therapeutic phlebotomy    HISTORY OF PRESENT ILLNESS:  Fred Farr is a 69 y.o. female who returns today for follow up of the above issue.      This is a 69-year-old female who presents for 6-month follow-up on the above diagnosis. She is doing well today, but does report mild fatigue. She was recently sick with an URI and completed a course of antibiotics. She is feeling much better now. She denies headaches, dizziness, or lightheadedness.      PHYSICAL EXAMINATION:    Vitals:    10/04/24 0819   BP: 94/62   Pulse: 61   Resp: 16   Temp: 97.6 °F (36.4 °C)   TempSrc: Oral   SpO2: 100%   Weight: 65 kg (143 lb 3.2 oz)   Height: 168 cm (66.14\")   PainSc: 0-No pain       General:  No acute distress, awake, alert and oriented  Skin:  Warm and dry. No visible rash.   HEENT:  Normocephalic/atraumatic.   Chest:  Normal respiratory effort  Extremities:  No visible clubbing, cyanosis, or edema  Neuro/psych:  Grossly nonfocal.  Normal mood and affect.       DIAGNOSTIC DATA:  Results from last 7 days   Lab Units 10/04/24  0816   WBC 10*3/mm3 7.25   HEMOGLOBIN g/dL 14.7   HEMATOCRIT % 45.6   PLATELETS 10*3/mm3 333               IMAGING:  None reviewed    ASSESSMENT:  This is a 69 y.o. female with:    *Polycythemia vera, JAK2 V617F mutation positive  Hgb normal until 11/12/2020 with hgb 16.0, hct 49.8%.  Hct mildly elevated since 2016 at 47-48%  She, however, states that this has been a mild and chronic issue  She does take aspirin 81 mg daily  She states that her Fitbit tells her that her oxygen levels dropped a little bit at night.  She carries no formal diagnosis of sleep apnea.  No congenital heart problems.  No significant time at high altitudes.  She does not smoke.  She denies any carbon monoxide exposure.  She was seen initially on 1/8/2021.  Hemoglobin 15.8 with " hematocrit 48.3%.  Laboratory evaluation pursued.  Erythropoietin normal at 4.5.  Carbon monoxide normal at 2.0.  Ferritin normal at 99.6 with normal iron studies.  She was referred to sleep medicine because of the erythrocytosis and because her Fitbit was telling her that her oxygen level dropped at night.  She saw Dr. Dhillon.  She was found to have mild obstructive sleep apnea by home sleep study 5/24/2021.  She has an appliance now which she is using.  This was recently replaced as her old one fractured.  JAK2 V617F mutation positive  Therapeutic phlebotomy initiated 3/28/2022 with a hematocrit of 48.3%.  Goal less than 42%.  4/29/2022: Hemoglobin improved at 15.3 with hematocrit improved to 47%.  Still above our goal of 42%.  5/27/2022: Hematocrit near goal at 42.7%.  We did not perform therapeutic phlebotomy.  For a hematocrit of 43.8% she did have phlebotomy on 7/8/2022  We have not yet initiated hydroxyurea, though this may be necessary  8/19/2022: Hemoglobin 13.4 and hematocrit at goal at 40.8%  11/18/2022: Hemoglobin higher at 14.7 with hematocrit 46.5%.  Phlebotomy required.  Other blood counts remain normal.  2/3/2023: Hematocrit 43.0%.  However, the MCV is slightly low and the reticulated hemoglobin is low and I believe she may be getting iron deficient.  Therefore, hold phlebotomy.  4/14/2023: Hemoglobin 13.2 with hematocrit 42.4%.  The MCV remains low at 77.9.  Ferritin 9.7 with an iron of 44 and 9% iron saturation  7/7/2023: Hemoglobin 13.8, hematocrit 44.5%.  Iron scratch that ferritin 13.9, iron 60 with 13% iron saturation.  Therapeutic phlebotomy not performed due to recent issues with iron deficiency  10/6/2023: Hemoglobin 14.0, hematocrit 44.8%, MCV 84.1.  Ferritin 13.2, iron 57 with 13% iron saturation.  1/5/24: Hemoglobin 14.5, hematocrit 45.9%. iron studies pending. With stability in hematocrit and ongoing hypotension, no phlebotomy today  4/26/2024: Hemoglobin stable at 14.2, hematocrit  44.1%  10/4/2024: Hemoglobin 14.7 and hematocrit 45.6%. Overall stable and given ongoing issues with hypotension, did not perform phlebotomy today.     *Recent iron deficiency  Likely result of phlebotomy.    10/6/2023: Ferritin 13.2, iron 57 with 13% iron saturation.  Stable.  1/5/2024: Ferritin somewhat improved at 16.9, iron much better at 112.    4/26/2024: Ferritin normal at 40.8, iron 87    *Hypotension  No syncope.  Blood pressure 112/68 today.  Asymptomatic.  10/4/2024: BP 94/62 today. Denies dizziness or lightheadedness.     PLAN:  Continue aspirin 81 mg daily.  No phlebotomy today as hemoglobin and hematocrit are overall stable.   Return to clinic in 3 months for MD visit and CBC iron studies, ferritin, and reticulocyte hemoglobin.    JORDI Louis

## 2024-10-04 ENCOUNTER — LAB (OUTPATIENT)
Dept: OTHER | Facility: HOSPITAL | Age: 69
End: 2024-10-04
Payer: MEDICARE

## 2024-10-04 ENCOUNTER — INFUSION (OUTPATIENT)
Dept: ONCOLOGY | Facility: HOSPITAL | Age: 69
End: 2024-10-04
Payer: MEDICARE

## 2024-10-04 ENCOUNTER — OFFICE VISIT (OUTPATIENT)
Dept: ONCOLOGY | Facility: CLINIC | Age: 69
End: 2024-10-04
Payer: MEDICARE

## 2024-10-04 VITALS
HEIGHT: 66 IN | BODY MASS INDEX: 23.01 KG/M2 | HEART RATE: 61 BPM | SYSTOLIC BLOOD PRESSURE: 94 MMHG | RESPIRATION RATE: 16 BRPM | TEMPERATURE: 97.6 F | DIASTOLIC BLOOD PRESSURE: 62 MMHG | OXYGEN SATURATION: 100 % | WEIGHT: 143.2 LBS

## 2024-10-04 DIAGNOSIS — D45 POLYCYTHEMIA VERA: Primary | ICD-10-CM

## 2024-10-04 DIAGNOSIS — D45 POLYCYTHEMIA VERA: ICD-10-CM

## 2024-10-04 LAB
BASOPHILS # BLD MANUAL: 0.15 10*3/MM3 (ref 0–0.2)
BASOPHILS NFR BLD MANUAL: 2 % (ref 0–1.5)
DEPRECATED RDW RBC AUTO: 50 FL (ref 37–54)
EOSINOPHIL # BLD MANUAL: 0.15 10*3/MM3 (ref 0–0.4)
EOSINOPHIL NFR BLD MANUAL: 2 % (ref 0.3–6.2)
ERYTHROCYTE [DISTWIDTH] IN BLOOD BY AUTOMATED COUNT: 15.5 % (ref 12.3–15.4)
HCT VFR BLD AUTO: 45.6 % (ref 34–46.6)
HGB BLD-MCNC: 14.7 G/DL (ref 12–15.9)
HGB RETIC QN AUTO: 32.5 PG (ref 29.8–36.1)
IMM RETICS NFR: 8.9 % (ref 3–15.8)
LYMPHOCYTES # BLD MANUAL: 2.68 10*3/MM3 (ref 0.7–3.1)
LYMPHOCYTES NFR BLD MANUAL: 7 % (ref 5–12)
MCH RBC QN AUTO: 28.5 PG (ref 26.6–33)
MCHC RBC AUTO-ENTMCNC: 32.2 G/DL (ref 31.5–35.7)
MCV RBC AUTO: 88.4 FL (ref 79–97)
MONOCYTES # BLD: 0.51 10*3/MM3 (ref 0.1–0.9)
NEUTROPHILS # BLD AUTO: 3.77 10*3/MM3 (ref 1.7–7)
NEUTROPHILS NFR BLD MANUAL: 52 % (ref 42.7–76)
PLAT MORPH BLD: NORMAL
PLATELET # BLD AUTO: 333 10*3/MM3 (ref 140–450)
PMV BLD AUTO: 10.8 FL (ref 6–12)
RBC # BLD AUTO: 5.16 10*6/MM3 (ref 3.77–5.28)
RBC MORPH BLD: NORMAL
RETICS # AUTO: 0.07 10*6/MM3 (ref 0.02–0.13)
RETICS/RBC NFR AUTO: 1.43 % (ref 0.7–1.9)
VARIANT LYMPHS NFR BLD MANUAL: 2 % (ref 0–5)
VARIANT LYMPHS NFR BLD MANUAL: 35 % (ref 19.6–45.3)
WBC MORPH BLD: NORMAL
WBC NRBC COR # BLD AUTO: 7.25 10*3/MM3 (ref 3.4–10.8)

## 2024-10-04 PROCEDURE — 85025 COMPLETE CBC W/AUTO DIFF WBC: CPT | Performed by: INTERNAL MEDICINE

## 2024-10-04 PROCEDURE — 85007 BL SMEAR W/DIFF WBC COUNT: CPT | Performed by: INTERNAL MEDICINE

## 2024-10-04 PROCEDURE — 36415 COLL VENOUS BLD VENIPUNCTURE: CPT

## 2024-10-04 PROCEDURE — 85046 RETICYTE/HGB CONCENTRATE: CPT | Performed by: INTERNAL MEDICINE

## 2024-11-01 ENCOUNTER — TELEPHONE (OUTPATIENT)
Dept: ONCOLOGY | Facility: CLINIC | Age: 69
End: 2024-11-01
Payer: MEDICARE

## 2024-11-01 NOTE — TELEPHONE ENCOUNTER
"  Caller: Fred Farr \"JATIN\"    Relationship: Self    Best call back number: [phone]     What is the best time to reach you: ASAP    Who are you requesting to speak with (clinical staff, provider,  specific staff member): CLINICAL      What was the call regarding: PLEASE CALL PT TO DISCUSS HER 10/4 APPT AND THE WAY IT WAS CODED FOR HER OFFICE VISIT.  SHE SAYS USUALLY HER LAB IS COVERED COMPLETELY BY INSURANCE AND THIS TIME SHE HAS TWO CHARGES, ONE FOR $30 OFFICE VISIT AND ONE FOR $30 UNDER INFUSION CATEGORY, BUT SHE DID NOT NEED PHLEBOTOMY.  THIS IS DIFFERENT THAN ALL HER PAST APPT CHARGES.  SHE HAS ALREADY SPOKEN WITH BILLING TWICE WHO ADVISED THE CHARGES ARE BILLED CORRECTLY, SO SHE NEEDS TO FIND OUT FROM SOMEONE WHAT THE CHANGE WAS AND IF IT WAS CODED CORRECTLY.  PLEASE CALL PT TO ADVISE.      "

## 2024-11-05 NOTE — TELEPHONE ENCOUNTER
Left patient a VM with my direct line, [phone], to discuss further. I am working on getting an answer from someone with the [employer], but we need additional info from the patient.

## 2024-11-11 NOTE — TELEPHONE ENCOUNTER
"Have not recived a call back from the patient, but I did receive a response from Burke TRAORE in billing.    Per Burke,   \"The patient is being billed for a treatment room charge for the facility-based provider she sees.  Many insurance companies don't adjudicate this to patient responsibility; however her plan has done that. The lab charges were paid on a separate bill and there is no patient responsibility on those.\"    "

## 2024-12-05 DIAGNOSIS — Z13.220 LIPID SCREENING: Primary | ICD-10-CM

## 2024-12-05 DIAGNOSIS — Z13.29 THYROID DISORDER SCREENING: ICD-10-CM

## 2024-12-09 ENCOUNTER — TELEPHONE (OUTPATIENT)
Dept: ONCOLOGY | Facility: CLINIC | Age: 69
End: 2024-12-09
Payer: MEDICARE

## 2024-12-09 NOTE — TELEPHONE ENCOUNTER
Left voicemail for patient inquiring about the billing questions she called in and mentioned to Lukas. Left my direct line to call back and discuss.

## 2024-12-12 LAB
ALBUMIN SERPL-MCNC: 3.7 G/DL (ref 3.5–5.2)
ALBUMIN/GLOB SERPL: 1.2 G/DL
ALP SERPL-CCNC: 83 U/L (ref 39–117)
ALT SERPL-CCNC: 18 U/L (ref 1–33)
APPEARANCE UR: CLEAR
AST SERPL-CCNC: 23 U/L (ref 1–32)
BACTERIA #/AREA URNS HPF: NORMAL /[HPF]
BASOPHILS # BLD AUTO: 0.07 10*3/MM3 (ref 0–0.2)
BASOPHILS NFR BLD AUTO: 1.2 % (ref 0–1.5)
BILIRUB SERPL-MCNC: 0.3 MG/DL (ref 0–1.2)
BILIRUB UR QL STRIP: NEGATIVE
BUN SERPL-MCNC: 17 MG/DL (ref 8–23)
BUN/CREAT SERPL: 17.2 (ref 7–25)
CALCIUM SERPL-MCNC: 9.3 MG/DL (ref 8.6–10.5)
CASTS URNS QL MICRO: NORMAL /LPF
CHLORIDE SERPL-SCNC: 106 MMOL/L (ref 98–107)
CHOLEST SERPL-MCNC: 195 MG/DL (ref 0–200)
CO2 SERPL-SCNC: 27.4 MMOL/L (ref 22–29)
COLOR UR: YELLOW
CREAT SERPL-MCNC: 0.99 MG/DL (ref 0.57–1)
EGFRCR SERPLBLD CKD-EPI 2021: 61.1 ML/MIN/1.73
EOSINOPHIL # BLD AUTO: 0.11 10*3/MM3 (ref 0–0.4)
EOSINOPHIL NFR BLD AUTO: 1.9 % (ref 0.3–6.2)
EPI CELLS #/AREA URNS HPF: NORMAL /HPF (ref 0–10)
ERYTHROCYTE [DISTWIDTH] IN BLOOD BY AUTOMATED COUNT: 14 % (ref 12.3–15.4)
GLOBULIN SER CALC-MCNC: 3 GM/DL
GLUCOSE SERPL-MCNC: 82 MG/DL (ref 65–99)
GLUCOSE UR QL STRIP: NEGATIVE
HCT VFR BLD AUTO: 46.9 % (ref 34–46.6)
HDLC SERPL-MCNC: 73 MG/DL (ref 40–60)
HGB BLD-MCNC: 15.5 G/DL (ref 12–15.9)
HGB UR QL STRIP: NEGATIVE
IMM GRANULOCYTES # BLD AUTO: 0.02 10*3/MM3 (ref 0–0.05)
IMM GRANULOCYTES NFR BLD AUTO: 0.3 % (ref 0–0.5)
KETONES UR QL STRIP: NEGATIVE
LDLC SERPL CALC-MCNC: 111 MG/DL (ref 0–100)
LEUKOCYTE ESTERASE UR QL STRIP: NEGATIVE
LYMPHOCYTES # BLD AUTO: 1.8 10*3/MM3 (ref 0.7–3.1)
LYMPHOCYTES NFR BLD AUTO: 30.7 % (ref 19.6–45.3)
MCH RBC QN AUTO: 29.8 PG (ref 26.6–33)
MCHC RBC AUTO-ENTMCNC: 33 G/DL (ref 31.5–35.7)
MCV RBC AUTO: 90 FL (ref 79–97)
MICRO URNS: NORMAL
MICRO URNS: NORMAL
MONOCYTES # BLD AUTO: 0.48 10*3/MM3 (ref 0.1–0.9)
MONOCYTES NFR BLD AUTO: 8.2 % (ref 5–12)
NEUTROPHILS # BLD AUTO: 3.39 10*3/MM3 (ref 1.7–7)
NEUTROPHILS NFR BLD AUTO: 57.7 % (ref 42.7–76)
NITRITE UR QL STRIP: NEGATIVE
NRBC BLD AUTO-RTO: 0 /100 WBC (ref 0–0.2)
PH UR STRIP: 6 [PH] (ref 5–7.5)
PLATELET # BLD AUTO: 417 10*3/MM3 (ref 140–450)
POTASSIUM SERPL-SCNC: 4.9 MMOL/L (ref 3.5–5.2)
PROT SERPL-MCNC: 6.7 G/DL (ref 6–8.5)
PROT UR QL STRIP: NEGATIVE
RBC # BLD AUTO: 5.21 10*6/MM3 (ref 3.77–5.28)
RBC #/AREA URNS HPF: NORMAL /HPF (ref 0–2)
SODIUM SERPL-SCNC: 140 MMOL/L (ref 136–145)
SP GR UR STRIP: 1.01 (ref 1–1.03)
TRIGL SERPL-MCNC: 58 MG/DL (ref 0–150)
TSH SERPL DL<=0.005 MIU/L-ACNC: 1.59 UIU/ML (ref 0.27–4.2)
URINALYSIS REFLEX: NORMAL
UROBILINOGEN UR STRIP-MCNC: 0.2 MG/DL (ref 0.2–1)
VLDLC SERPL CALC-MCNC: 11 MG/DL (ref 5–40)
WBC # BLD AUTO: 5.87 10*3/MM3 (ref 3.4–10.8)
WBC #/AREA URNS HPF: NORMAL /HPF (ref 0–5)

## 2024-12-17 ENCOUNTER — OFFICE VISIT (OUTPATIENT)
Dept: INTERNAL MEDICINE | Facility: CLINIC | Age: 69
End: 2024-12-17
Payer: MEDICARE

## 2024-12-17 VITALS
OXYGEN SATURATION: 97 % | SYSTOLIC BLOOD PRESSURE: 92 MMHG | HEART RATE: 79 BPM | DIASTOLIC BLOOD PRESSURE: 52 MMHG | WEIGHT: 145 LBS | HEIGHT: 67 IN | BODY MASS INDEX: 22.76 KG/M2

## 2024-12-17 DIAGNOSIS — D45 POLYCYTHEMIA VERA: ICD-10-CM

## 2024-12-17 DIAGNOSIS — Z12.31 ENCOUNTER FOR SCREENING MAMMOGRAM FOR BREAST CANCER: ICD-10-CM

## 2024-12-17 DIAGNOSIS — Z78.0 MENOPAUSE: ICD-10-CM

## 2024-12-17 DIAGNOSIS — M85.80 OSTEOPENIA, UNSPECIFIED LOCATION: ICD-10-CM

## 2024-12-17 DIAGNOSIS — G47.33 OSA (OBSTRUCTIVE SLEEP APNEA): ICD-10-CM

## 2024-12-17 DIAGNOSIS — Z00.00 HEALTHCARE MAINTENANCE: Primary | ICD-10-CM

## 2024-12-17 PROCEDURE — 1126F AMNT PAIN NOTED NONE PRSNT: CPT | Performed by: INTERNAL MEDICINE

## 2024-12-17 PROCEDURE — 99213 OFFICE O/P EST LOW 20 MIN: CPT | Performed by: INTERNAL MEDICINE

## 2024-12-17 PROCEDURE — 1170F FXNL STATUS ASSESSED: CPT | Performed by: INTERNAL MEDICINE

## 2024-12-17 PROCEDURE — G0439 PPPS, SUBSEQ VISIT: HCPCS | Performed by: INTERNAL MEDICINE

## 2024-12-17 RX ORDER — VALACYCLOVIR HYDROCHLORIDE 1 G/1
2000 TABLET, FILM COATED ORAL AS NEEDED
Qty: 12 TABLET | Refills: 1 | Status: SHIPPED | OUTPATIENT
Start: 2024-12-17

## 2024-12-17 NOTE — PROGRESS NOTES
Subjective   The ABCs of the Annual Wellness Visit  Medicare Wellness Visit      Fred Farr is a 69 y.o. patient who presents for a Medicare Wellness Visit.    The following portions of the patient's history were reviewed and   updated as appropriate: allergies, current medications, past family history, past medical history, past social history, past surgical history, and problem list.    Compared to one year ago, the patient's physical   health is better.  Compared to one year ago, the patient's mental   health is better.    Recent Hospitalizations:  She was not admitted to the hospital during the last year.     Current Medical Providers:  Patient Care Team:  Viri Caldera MD as PCP - General (Internal Medicine)  Lenard Pedraza MD as Consulting Physician (Hematology and Oncology)  Viri Caldera MD as Referring Physician (Internal Medicine)  Viri Caldera MD as Consulting Physician (Internal Medicine)  Kelly Gutierrez GC as Referring Physician (Genetic Counseling)    Outpatient Medications Prior to Visit   Medication Sig Dispense Refill    acyclovir (Zovirax) 5 % ointment Apply 1 application  topically to the appropriate area as directed 4 (Four) Times a Day. 15 g 3    albuterol sulfate  (90 Base) MCG/ACT inhaler Inhale 2 puffs Every 4 (Four) Hours As Needed for Wheezing. 18 g 0    aspirin 81 MG EC tablet Take 1 tablet by mouth Daily. 2 pills      AZITHROMYCIN OP Apply  to eye(s) as directed by provider.      buPROPion XL (WELLBUTRIN XL) 150 MG 24 hr tablet Take 1 tablet by mouth Every Morning. 90 tablet 3    fluticasone (FLONASE) 50 MCG/ACT nasal spray Administer 2 sprays into the nostril(s) as directed by provider Daily.      Loratadine (CLARITIN PO) Take 1 tablet by mouth Daily.      melatonin 5 MG sublingual tablet sublingual tablet Place  under the tongue.      montelukast (SINGULAIR) 10 MG tablet Take 1 tablet by mouth every night at bedtime. 90 tablet 3    Vitamin D, Cholecalciferol, 25 MCG  (1000 UT) capsule Take 1 capsule by mouth Daily.      valACYclovir (VALTREX) 1000 MG tablet Take 2 tablets by mouth As Needed (FOR 2 DAYS PRN). 12 tablet 1    erythromycin (ROMYCIN) 5 MG/GM ophthalmic ointment Administer  to both eyes Every Night. (Patient not taking: Reported on 12/17/2024)      ipratropium (ATROVENT) 0.06 % nasal spray Administer 2 sprays into the nostril(s) as directed by provider 3 (Three) Times a Day. 15 mL 12    NON FORMULARY Take 1 dose by mouth Daily. Balance of nature supplement      albuterol sulfate  (90 Base) MCG/ACT inhaler Inhale 2 puffs Every 4 (Four) Hours As Needed for Wheezing. (Patient not taking: Reported on 12/17/2024) 18 g 1    azithromycin (Zithromax) 250 MG tablet Take 2 tablets the first day, then 1 tablet daily for 4 days. (Patient not taking: Reported on 12/17/2024) 6 tablet 0    benzonatate (Tessalon Perles) 100 MG capsule Take 1 capsule by mouth 3 (Three) Times a Day As Needed for Cough. (Patient not taking: Reported on 12/17/2024) 30 capsule 3    ofloxacin (Ocuflox) 0.3 % ophthalmic solution Instill 1 to 2 drops in affected eyes every 2-4 hours for the first 2 days, then instill 1-2 drops 4 times a day for additional 5 days. Total treatment of 7 days (Patient not taking: Reported on 12/17/2024) 10 mL 0     No facility-administered medications prior to visit.     No opioid medication identified on active medication list. I have reviewed chart for other potential  high risk medication/s and harmful drug interactions in the elderly.      Aspirin is on active medication list. Aspirin use is indicated based on review of current medical condition/s. Pros and cons of this therapy have been discussed today. Benefits of this medication outweigh potential harm.  Patient has been encouraged to continue taking this medication.  .      Patient Active Problem List   Diagnosis    Allergic rhinitis    Hearing impairment    Recurrent cold sores    Encounter for screening for  "malignant neoplasm of colon    Erythrocytosis    FERDINAND (obstructive sleep apnea)    EDI-2 gene mutation    Polycythemia vera    Paresthesia of left arm and leg    Screening for colon cancer    Family history of colon cancer in father    Colon polyps    Diverticulosis     Advance Care Planning Advance Directive is on file.  ACP discussion was held with the patient during this visit. Patient has an advance directive in EMR which is still valid.             Objective   Vitals:    24 0917   BP: 92/52   Pulse: 79   SpO2: 97%   Weight: 65.8 kg (145 lb)   Height: 168.9 cm (66.5\")   PainSc: 0-No pain       Estimated body mass index is 23.05 kg/m² as calculated from the following:    Height as of this encounter: 168.9 cm (66.5\").    Weight as of this encounter: 65.8 kg (145 lb).    BMI is within normal parameters. No other follow-up for BMI required.           Does the patient have evidence of cognitive impairment? No  Lab Results   Component Value Date    CHLPL 195 2024    TRIG 58 2024    HDL 73 (H) 2024     (H) 2024    VLDL 11 2024                                                                                                Health  Risk Assessment    Smoking Status:  Social History     Tobacco Use   Smoking Status Never   Smokeless Tobacco Never     Alcohol Consumption:  Social History     Substance and Sexual Activity   Alcohol Use Yes    Comment: Occasionally drink a hard cider--once per 2 months       Fall Risk Screen  STEADI Fall Risk Assessment was completed, and patient is at LOW risk for falls.Assessment completed on:2024    Depression Screening   Little interest or pleasure in doing things? Not at all   Feeling down, depressed, or hopeless? Not at all   PHQ-2 Total Score 0      Health Habits and Functional and Cognitive Screenin/16/2024     8:50 PM   Functional & Cognitive Status   Do you have difficulty preparing food and eating? No    Do you have " difficulty bathing yourself, getting dressed or grooming yourself? No    Do you have difficulty using the toilet? No    Do you have difficulty moving around from place to place? No    Do you have trouble with steps or getting out of a bed or a chair? No    Current Diet Well Balanced Diet    Dental Exam Up to date    Eye Exam Up to date    Exercise (times per week) 5 times per week    Current Exercises Include Tennis;Walking    Do you need help using the phone?  No    Are you deaf or do you have serious difficulty hearing?  No    Do you need help to go to places out of walking distance? No    Do you need help shopping? No    Do you need help preparing meals?  No    Do you need help with housework?  No    Do you need help with laundry? No    Do you need help taking your medications? No    Do you need help managing money? No    Do you ever drive or ride in a car without wearing a seat belt? No    Have you felt unusual stress, anger or loneliness in the last month? No    Who do you live with? Spouse    If you need help, do you have trouble finding someone available to you? No    Have you been bothered in the last four weeks by sexual problems? No    Do you have difficulty concentrating, remembering or making decisions? No        Patient-reported           Age-appropriate Screening Schedule:  Refer to the list below for future screening recommendations based on patient's age, sex and/or medical conditions. Orders for these recommended tests are listed in the plan section. The patient has been provided with a written plan.    Health Maintenance List  Health Maintenance   Topic Date Due    DXA SCAN  06/28/2024    PAP SMEAR  11/19/2024    COVID-19 Vaccine (5 - 2024-25 season) 12/19/2024 (Originally 9/1/2024)    LIPID PANEL  12/11/2025    ANNUAL WELLNESS VISIT  12/17/2025    MAMMOGRAM  01/10/2026    TDAP/TD VACCINES (2 - Td or Tdap) 10/20/2027    COLORECTAL CANCER SCREENING  08/01/2029    HEPATITIS C SCREENING  Completed     "INFLUENZA VACCINE  Completed    Pneumococcal Vaccine 65+  Completed    ZOSTER VACCINE  Completed                                                                                                                                                CMS Preventative Services Quick Reference  Risk Factors Identified During Encounter  Immunizations Discussed/Encouraged: COVID19    The above risks/problems have been discussed with the patient.  Pertinent information has been shared with the patient in the After Visit Summary.  An After Visit Summary and PPPS were made available to the patient.    Follow Up:   Next Medicare Wellness visit to be scheduled in 1 year.         Additional E&M Note during same encounter follows:  Patient has additional, significant, and separately identifiable condition(s)/problem(s) that require work above and beyond the Medicare Wellness Visit     Chief Complaint  Annual Exam  Allergic rhinitis  Oral hsv  Polycythemia  Seamus    Subjective   HPI  JATIN is also being seen today for an annual adult preventative physical exam.  and JATIN is also being seen today for additional medical problem/s.                Objective   Vital Signs:  BP 92/52   Pulse 79   Ht 168.9 cm (66.5\")   Wt 65.8 kg (145 lb)   SpO2 97%   BMI 23.05 kg/m²   Physical Exam  Vitals and nursing note reviewed.   Constitutional:       Appearance: Normal appearance. She is well-developed.   HENT:      Head: Normocephalic and atraumatic.      Right Ear: Tympanic membrane and external ear normal.      Left Ear: Tympanic membrane and external ear normal.      Nose: Nose normal.      Mouth/Throat:      Mouth: Mucous membranes are moist.   Eyes:      Extraocular Movements: Extraocular movements intact.      Pupils: Pupils are equal, round, and reactive to light.   Cardiovascular:      Rate and Rhythm: Normal rate and regular rhythm.      Pulses: Normal pulses.      Heart sounds: Normal heart sounds.   Pulmonary:      Effort: Pulmonary effort " is normal. No respiratory distress.      Breath sounds: Normal breath sounds.   Chest:   Breasts:     Right: Normal.      Left: Normal.   Abdominal:      General: Abdomen is flat.      Palpations: Abdomen is soft.   Genitourinary:     General: Normal vulva.      Rectum: Normal.   Musculoskeletal:         General: Normal range of motion.      Cervical back: Normal range of motion and neck supple.   Skin:     General: Skin is warm and dry.   Neurological:      General: No focal deficit present.      Mental Status: She is alert and oriented to person, place, and time.   Psychiatric:         Mood and Affect: Mood normal.         Behavior: Behavior normal.         Thought Content: Thought content normal.         Judgment: Judgment normal.         The following data was reviewed by: Viri Caldera MD on 12/17/2024:  Data reviewed : Radiologic studies mammogram and Cardiology studies vascular screening  Common labs          9/14/2024    13:42 10/4/2024    08:16 12/11/2024    08:54   Common Labs   Glucose   82    BUN   17    Creatinine   0.99    Sodium   140    Potassium   4.9    Chloride   106    Calcium   9.3    Total Protein   6.7    Albumin   3.7    Total Bilirubin   0.3    Alkaline Phosphatase   83    AST (SGOT)   23    ALT (SGPT)   18    WBC 9.93     7.25  5.87    Hemoglobin 15.2     14.7  15.5    Hematocrit 46.9     45.6  46.9    Platelets 269     333  417    Total Cholesterol   195    Triglycerides   58    HDL Cholesterol   73    LDL Cholesterol    111       Details          This result is from an external source.                     Assessment and Plan            Menopause    Orders:    DEXA Bone Density Axial    Osteopenia, unspecified location    Orders:    DEXA Bone Density Axial    FERDINAND (obstructive sleep apnea)  Continue mandibular device         Polycythemia vera  To hematology and will determine next phlebotomy. She is to continue mandibular device.          Healthcare maintenance  See above. Declines covid  given recent infection.        Encounter for screening mammogram for breast cancer    Orders:    Mammo screening digital tomosynthesis bilateral w CAD; Future  sbe emphasized.         I spent 52 minutes caring for Fred on this date of service. This time includes time spent by me in the following activities:preparing for the visit, reviewing tests, obtaining and/or reviewing a separately obtained history, performing a medically appropriate examination and/or evaluation , counseling and educating the patient/family/caregiver, ordering medications, tests, or procedures, referring and communicating with other health care professionals , documenting information in the medical record, and independently interpreting results and communicating that information with the patient/family/caregiver  Follow Up   No follow-ups on file.  Patient was given instructions and counseling regarding her condition or for health maintenance advice. Please see specific information pulled into the AVS if appropriate.

## 2025-01-14 RX ORDER — VALACYCLOVIR HYDROCHLORIDE 1 G/1
2000 TABLET, FILM COATED ORAL DAILY
Qty: 30 TABLET | Refills: 1 | Status: SHIPPED | OUTPATIENT
Start: 2025-01-14

## 2025-01-15 NOTE — PROGRESS NOTES
"[facility] GROUP OUTPATIENT FOLLOW UP CLINIC VISIT    REASON FOR FOLLOW-UP:    JAK2 V617F mutation positive polycythemia vera  Current therapy with therapeutic phlebotomy    HISTORY OF PRESENT ILLNESS:  Fred Farr is a 69 y.o. female who returns today for follow up of the above issue.      She continues to feel very well.  She does not believe she hydrated very well this morning.        PHYSICAL EXAMINATION:    Vitals:    01/17/25 0913   BP: 110/73   Pulse: 68   Resp: 16   Temp: 97.6 °F (36.4 °C)   TempSrc: Oral   SpO2: 98%   Weight: 66.1 kg (145 lb 12.8 oz)   Height: 168 cm (66.14\")   PainSc: 0-No pain       General:  No acute distress, awake, alert and oriented  Skin:  Warm and dry. No visible rash.   HEENT:  Normocephalic/atraumatic.   Chest:  Normal respiratory effort.  Lungs clear to auscultation bilaterally.  Heart: Regular rate and rhythm  Extremities:  No visible clubbing, cyanosis, or edema  Neuro/psych:  Grossly nonfocal.  Normal mood and affect.       DIAGNOSTIC DATA:    Results from last 7 days   Lab Units 01/17/25  0858   WBC 10*3/mm3 5.58   NEUTROS ABS 10*3/mm3 3.05   HEMOGLOBIN g/dL 15.3   HEMATOCRIT % 47.5*   PLATELETS 10*3/mm3 353       Results from last 7 days   Lab Units 01/17/25  0858   FERRITIN ng/mL 30.20   IRON mcg/dL 63   TIBC mcg/dL 386           IMAGING:  None reviewed    ASSESSMENT:  This is a 69 y.o. female with:    *Polycythemia vera, JAK2 V617F mutation positive  Hgb normal until 11/12/2020 with hgb 16.0, hct 49.8%.  Hct mildly elevated since 2016 at 47-48%  She, however, states that this has been a mild and chronic issue  She does take aspirin 81 mg daily  She states that her Fitbit tells her that her oxygen levels dropped a little bit at night.  She carries no formal diagnosis of sleep apnea.  No congenital heart problems.  No significant time at high altitudes.  She does not smoke.  She denies any carbon monoxide exposure.  She was seen initially on 1/8/2021.  Hemoglobin " 15.8 with hematocrit 48.3%.  Laboratory evaluation pursued.  Erythropoietin normal at 4.5.  Carbon monoxide normal at 2.0.  Ferritin normal at 99.6 with normal iron studies.  She was referred to sleep medicine because of the erythrocytosis and because her Fitbit was telling her that her oxygen level dropped at night.  She saw Dr. Dhillon.  She was found to have mild obstructive sleep apnea by home sleep study 5/24/2021.  She has an appliance now which she is using.  This was recently replaced as her old one fractured.  JAK2 V617F mutation positive  Therapeutic phlebotomy initiated 3/28/2022 with a hematocrit of 48.3%.  Goal less than 42%.  4/29/2022: Hemoglobin improved at 15.3 with hematocrit improved to 47%.  Still above our goal of 42%.  5/27/2022: Hematocrit near goal at 42.7%.  We did not perform therapeutic phlebotomy.  For a hematocrit of 43.8% she did have phlebotomy on 7/8/2022  We have not yet initiated hydroxyurea, though this may be necessary  8/19/2022: Hemoglobin 13.4 and hematocrit at goal at 40.8%  11/18/2022: Hemoglobin higher at 14.7 with hematocrit 46.5%.  Phlebotomy required.  Other blood counts remain normal.  2/3/2023: Hematocrit 43.0%.  However, the MCV is slightly low and the reticulated hemoglobin is low and I believe she may be getting iron deficient.  Therefore, hold phlebotomy.  4/14/2023: Hemoglobin 13.2 with hematocrit 42.4%.  The MCV remains low at 77.9.  Ferritin 9.7 with an iron of 44 and 9% iron saturation  7/7/2023: Hemoglobin 13.8, hematocrit 44.5%.  Iron scratch that ferritin 13.9, iron 60 with 13% iron saturation.  Therapeutic phlebotomy not performed due to recent issues with iron deficiency  10/6/2023: Hemoglobin 14.0, hematocrit 44.8%, MCV 84.1.  Ferritin 13.2, iron 57 with 13% iron saturation.  1/5/24: Hemoglobin 14.5, hematocrit 45.9%. iron studies pending. With stability in hematocrit and ongoing hypotension, no phlebotomy today  4/26/2024: Hemoglobin stable at 14.2,  hematocrit 44.1%  10/4/2024: Hemoglobin 14.7 and hematocrit 45.6%. Overall stable and given ongoing issues with hypotension, did not perform phlebotomy today.   1/17/2025: Hematocrit 47.5%, ferritin 30, iron 63 with 16% iron saturation.    *Recent iron deficiency  Likely result of phlebotomy.    10/6/2023: Ferritin 13.2, iron 57 with 13% iron saturation.  Stable.  1/5/2024: Ferritin somewhat improved at 16.9, iron much better at 112.    4/26/2024: Ferritin normal at 40.8, iron 87  1/17/2025: Iron normal at 63, ferritin normal but down a little at 30.2    *Hypotension  Blood pressure a little better at 110/73 today.  Asymptomatic.    PLAN:  She is not interested in therapeutic phlebotomy today.  Therefore, continue monitoring.  She does believe she is a little dehydrated today which would be a good reason not to do therapeutic phlebotomy given her recent issues with hypotension..  Continue aspirin 81 mg daily.  APRN visit in a few months.  I will see her back a few months after that.  Generally preferring to keep hematocrit less than 45% but we are making a judgment each time she is seen as to whether phlebotomy is necessary.    I spent 35 minutes in this visit today reviewing her record, communicating with her and her , examining her, communicating with staff, placing orders, documenting the encounter.

## 2025-01-17 ENCOUNTER — OFFICE VISIT (OUTPATIENT)
Dept: ONCOLOGY | Facility: CLINIC | Age: 70
End: 2025-01-17
Payer: MEDICARE

## 2025-01-17 ENCOUNTER — APPOINTMENT (OUTPATIENT)
Dept: ONCOLOGY | Facility: HOSPITAL | Age: 70
End: 2025-01-17
Payer: MEDICARE

## 2025-01-17 ENCOUNTER — LAB (OUTPATIENT)
Dept: OTHER | Facility: HOSPITAL | Age: 70
End: 2025-01-17
Payer: MEDICARE

## 2025-01-17 VITALS
WEIGHT: 145.8 LBS | TEMPERATURE: 97.6 F | RESPIRATION RATE: 16 BRPM | DIASTOLIC BLOOD PRESSURE: 73 MMHG | SYSTOLIC BLOOD PRESSURE: 110 MMHG | HEIGHT: 66 IN | OXYGEN SATURATION: 98 % | BODY MASS INDEX: 23.43 KG/M2 | HEART RATE: 68 BPM

## 2025-01-17 DIAGNOSIS — D45 POLYCYTHEMIA VERA: Primary | ICD-10-CM

## 2025-01-17 DIAGNOSIS — D45 POLYCYTHEMIA VERA: ICD-10-CM

## 2025-01-17 LAB
BASOPHILS # BLD AUTO: 0.07 10*3/MM3 (ref 0–0.2)
BASOPHILS NFR BLD AUTO: 1.3 % (ref 0–1.5)
DEPRECATED RDW RBC AUTO: 51.9 FL (ref 37–54)
EOSINOPHIL # BLD AUTO: 0.09 10*3/MM3 (ref 0–0.4)
EOSINOPHIL NFR BLD AUTO: 1.6 % (ref 0.3–6.2)
ERYTHROCYTE [DISTWIDTH] IN BLOOD BY AUTOMATED COUNT: 15.8 % (ref 12.3–15.4)
FERRITIN SERPL-MCNC: 30.2 NG/ML (ref 13–150)
HCT VFR BLD AUTO: 47.5 % (ref 34–46.6)
HGB BLD-MCNC: 15.3 G/DL (ref 12–15.9)
HGB RETIC QN AUTO: 31 PG (ref 29.8–36.1)
IMM GRANULOCYTES # BLD AUTO: 0.02 10*3/MM3 (ref 0–0.05)
IMM GRANULOCYTES NFR BLD AUTO: 0.4 % (ref 0–0.5)
IMM RETICS NFR: 7.6 % (ref 3–15.8)
IRON 24H UR-MRATE: 63 MCG/DL (ref 37–145)
IRON SATN MFR SERPL: 16 % (ref 20–50)
LYMPHOCYTES # BLD AUTO: 1.76 10*3/MM3 (ref 0.7–3.1)
LYMPHOCYTES NFR BLD AUTO: 31.5 % (ref 19.6–45.3)
MCH RBC QN AUTO: 29.1 PG (ref 26.6–33)
MCHC RBC AUTO-ENTMCNC: 32.2 G/DL (ref 31.5–35.7)
MCV RBC AUTO: 90.5 FL (ref 79–97)
MONOCYTES # BLD AUTO: 0.59 10*3/MM3 (ref 0.1–0.9)
MONOCYTES NFR BLD AUTO: 10.6 % (ref 5–12)
NEUTROPHILS NFR BLD AUTO: 3.05 10*3/MM3 (ref 1.7–7)
NEUTROPHILS NFR BLD AUTO: 54.6 % (ref 42.7–76)
NRBC BLD AUTO-RTO: 0 /100 WBC (ref 0–0.2)
PLATELET # BLD AUTO: 353 10*3/MM3 (ref 140–450)
PMV BLD AUTO: 10.8 FL (ref 6–12)
RBC # BLD AUTO: 5.25 10*6/MM3 (ref 3.77–5.28)
RETICS # AUTO: 0.06 10*6/MM3 (ref 0.02–0.13)
RETICS/RBC NFR AUTO: 1.19 % (ref 0.7–1.9)
TIBC SERPL-MCNC: 386 MCG/DL (ref 298–536)
TRANSFERRIN SERPL-MCNC: 259 MG/DL (ref 200–360)
WBC NRBC COR # BLD AUTO: 5.58 10*3/MM3 (ref 3.4–10.8)

## 2025-01-17 PROCEDURE — 83540 ASSAY OF IRON: CPT

## 2025-01-17 PROCEDURE — 82728 ASSAY OF FERRITIN: CPT

## 2025-01-17 PROCEDURE — 84466 ASSAY OF TRANSFERRIN: CPT

## 2025-01-17 PROCEDURE — 36415 COLL VENOUS BLD VENIPUNCTURE: CPT

## 2025-01-17 PROCEDURE — 85025 COMPLETE CBC W/AUTO DIFF WBC: CPT

## 2025-01-17 PROCEDURE — 85046 RETICYTE/HGB CONCENTRATE: CPT

## 2025-01-20 ENCOUNTER — TELEPHONE (OUTPATIENT)
Dept: ONCOLOGY | Facility: CLINIC | Age: 70
End: 2025-01-20
Payer: MEDICARE

## 2025-01-20 NOTE — TELEPHONE ENCOUNTER
Patient had a question about a [facility] $30 charge for DOS 10/4/24. I sent all available info for that DOS to [facility] for review. Per Kristin CRAWFORD with [facility] this $30 charge for DOS 10/4/24 will be reversed. Called to let patient know, but had to leave a VM.

## 2025-01-21 RX ORDER — VALACYCLOVIR HYDROCHLORIDE 1 G/1
2000 TABLET, FILM COATED ORAL 2 TIMES DAILY PRN
Qty: 12 TABLET | Refills: 1 | Status: SHIPPED | OUTPATIENT
Start: 2025-01-21 | End: 2025-01-27 | Stop reason: SDUPTHER

## 2025-01-23 ENCOUNTER — OFFICE VISIT (OUTPATIENT)
Dept: INTERNAL MEDICINE | Facility: CLINIC | Age: 70
End: 2025-01-23
Payer: MEDICARE

## 2025-01-23 VITALS
RESPIRATION RATE: 16 BRPM | DIASTOLIC BLOOD PRESSURE: 70 MMHG | OXYGEN SATURATION: 99 % | HEART RATE: 86 BPM | SYSTOLIC BLOOD PRESSURE: 124 MMHG | HEIGHT: 66 IN | WEIGHT: 145 LBS | BODY MASS INDEX: 23.3 KG/M2

## 2025-01-23 DIAGNOSIS — R22.31 MASS OF AXILLA, RIGHT: Primary | ICD-10-CM

## 2025-01-23 PROBLEM — H25.9 AGE-RELATED CATARACT OF BOTH EYES: Status: ACTIVE | Noted: 2023-08-30

## 2025-01-23 PROCEDURE — 99213 OFFICE O/P EST LOW 20 MIN: CPT | Performed by: NURSE PRACTITIONER

## 2025-01-23 PROCEDURE — 1126F AMNT PAIN NOTED NONE PRSNT: CPT | Performed by: NURSE PRACTITIONER

## 2025-01-23 NOTE — PROGRESS NOTES
Subjective   Fred Farr is a 69 y.o. female. Patient is here today for   Chief Complaint   Patient presents with    Mass            Vitals:    01/23/25 1324   BP: 124/70   Pulse: 86   Resp: 16   SpO2: 99%     Body mass index is 23.3 kg/m².  The following portions of the patient's history were reviewed and updated as appropriate: allergies, current medications, past family history, past medical history, past social history, past surgical history and problem list.    History of Present Illness  The patient presents for evaluation of a lump under her arm.    She reports the presence of a lump under her arm, the nature of which she is uncertain, questioning whether it could be a lymph node or a cyst. She resumed playing tennis in October 2024 but experienced significant upper arm soreness, which she managed by moderating her pace and taking rest during the holidays. Despite a period of 3 to 4 weeks without tennis, she noticed an unusual sensation in her shoulder upon waking up on Wednesday morning, akin to post-workout soreness. She initially attributed this to an awkward sleeping position. However, while changing clothes later that day, she discovered a large, swollen area under her arm. She is unsure if this swelling had been gradually developing or if it appeared suddenly. She reports  underwent a breast examination with Dr. Caldera approximately 2 weeks ago, during which no abnormalities were noted. She reports no systemic symptoms such as fever, chills, or body aches, but mentions feeling slightly fatigued today. She also reports no pain at rest, but notes mild discomfort with movement, rating her pain as 1 out of 10      Past Medical History:   Diagnosis Date    Allergic 2013    Recently tested--mild allergies    Anxiety     Depression     Encounter for screening for malignant neoplasm of colon 01/18/2018    History of COVID-19     HL (hearing loss) 2000    Low blood pressure     FERDINAND (obstructive sleep apnea)  05/24/2021    Home sleep study.  Mild FERDINAND with AHI 8 events per hour.  No sleep-related hypoxia.  The patient snored 2.5% of total monitoring time.    Right bundle branch block     Skin cancer     Sleep apnea     dental appliance      Allergies   Allergen Reactions    Aspergillus Species Anaphylaxis    Aureobasidium Allergy Skin Test Anaphylaxis    Botrytis Cinerea Allergy Skin Test Anaphylaxis    Rich Anaphylaxis    Cladosporium Cladosporioides Allergy Skin Test Anaphylaxis    Corn Pollen Anaphylaxis    Dust Mite Extract Anaphylaxis      Social History     Socioeconomic History    Marital status:      Spouse name: Rodney   Tobacco Use    Smoking status: Never    Smokeless tobacco: Never   Vaping Use    Vaping status: Never Used   Substance and Sexual Activity    Alcohol use: Not Currently     Comment: Occasionally drink a hard cider--once per 2 months    Drug use: No    Sexual activity: Yes     Partners: Male     Birth control/protection: Post-menopausal        Current Outpatient Medications:     acyclovir (Zovirax) 5 % ointment, Apply 1 application  topically to the appropriate area as directed 4 (Four) Times a Day., Disp: 15 g, Rfl: 3    albuterol sulfate  (90 Base) MCG/ACT inhaler, Inhale 2 puffs Every 4 (Four) Hours As Needed for Wheezing., Disp: 18 g, Rfl: 0    aspirin 81 MG EC tablet, Take 1 tablet by mouth Daily. 2 pills, Disp: , Rfl:     buPROPion XL (WELLBUTRIN XL) 150 MG 24 hr tablet, Take 1 tablet by mouth Every Morning., Disp: 90 tablet, Rfl: 3    erythromycin (ROMYCIN) 5 MG/GM ophthalmic ointment, Administer  to both eyes Every Night., Disp: , Rfl:     fluticasone (FLONASE) 50 MCG/ACT nasal spray, Administer 2 sprays into the nostril(s) as directed by provider Daily., Disp: , Rfl:     ipratropium (ATROVENT) 0.06 % nasal spray, Administer 2 sprays into the nostril(s) as directed by provider 3 (Three) Times a Day., Disp: 15 mL, Rfl: 12    Loratadine (CLARITIN PO), Take 1 tablet by  "mouth Daily., Disp: , Rfl:     melatonin 5 MG sublingual tablet sublingual tablet, Place  under the tongue., Disp: , Rfl:     montelukast (SINGULAIR) 10 MG tablet, Take 1 tablet by mouth every night at bedtime., Disp: 90 tablet, Rfl: 3    NON FORMULARY, Take 1 dose by mouth Daily. Balance of nature supplement, Disp: , Rfl:     valACYclovir (VALTREX) 1000 MG tablet, Take 2 tablets by mouth 2 (Two) Times a Day As Needed (fever blister). For 2 days, Disp: 12 tablet, Rfl: 1    Vitamin D, Cholecalciferol, 25 MCG (1000 UT) capsule, Take 1 capsule by mouth Daily., Disp: , Rfl:    Review of Systems   Constitutional:  Positive for fatigue. Negative for fever.   HENT: Negative.     Respiratory: Negative.     Cardiovascular: Negative.    Musculoskeletal:  Negative for joint swelling.        Right shoulder pain intermittent since the fall     Skin:  Negative for rash.        Mass of right axilla        Objective   Physical Exam  Vitals and nursing note reviewed.   Cardiovascular:      Rate and Rhythm: Normal rate and regular rhythm.   Pulmonary:      Effort: Pulmonary effort is normal.      Breath sounds: Normal breath sounds.   Lymphadenopathy:      Comments: 2\"x 2.5 \" soft mass to right axilla, no redness or induration, no rash noted, mildly tender   Neurological:      Mental Status: She is alert.       WBC   Date Value Ref Range Status   01/17/2025 5.58 3.40 - 10.80 10*3/mm3 Final   12/11/2024 5.87 3.40 - 10.80 10*3/mm3 Final   09/14/2024 9.93 4.5 - 11.0 10*3/uL Final     RBC   Date Value Ref Range Status   01/17/2025 5.25 3.77 - 5.28 10*6/mm3 Final   12/11/2024 5.21 3.77 - 5.28 10*6/mm3 Final   09/14/2024 5.34 (H) 4.0 - 5.2 10*6/uL Final     Hemoglobin   Date Value Ref Range Status   01/17/2025 15.3 12.0 - 15.9 g/dL Final   09/14/2024 15.2 12.0 - 16.0 g/dL Final     Hematocrit   Date Value Ref Range Status   01/17/2025 47.5 (H) 34.0 - 46.6 % Final   09/14/2024 46.9 (H) 36.0 - 46.0 % Final     MCV   Date Value Ref Range " Status   01/17/2025 90.5 79.0 - 97.0 fL Final   09/14/2024 87.8 80.0 - 100.0 fL Final     MCH   Date Value Ref Range Status   01/17/2025 29.1 26.6 - 33.0 pg Final   09/14/2024 28.5 26.0 - 34.0 pg Final     MCHC   Date Value Ref Range Status   01/17/2025 32.2 31.5 - 35.7 g/dL Final   09/14/2024 32.4 31.0 - 37.0 g/dL Final     RDW   Date Value Ref Range Status   01/17/2025 15.8 (H) 12.3 - 15.4 % Final   09/14/2024 15 12.0 - 16.8 % Final     RDW-SD   Date Value Ref Range Status   01/17/2025 51.9 37.0 - 54.0 fl Final     MPV   Date Value Ref Range Status   01/17/2025 10.8 6.0 - 12.0 fL Final   09/14/2024 10.2 8.4 - 12.4 fL Final     Platelets   Date Value Ref Range Status   01/17/2025 353 140 - 450 10*3/mm3 Final   09/14/2024 269 140 - 440 10*3/uL Final     Neutrophil Rel %   Date Value Ref Range Status   09/14/2024 84.2 (H) 45 - 80 % Final     Neutrophil %   Date Value Ref Range Status   01/17/2025 54.6 42.7 - 76.0 % Final     Lymphocyte Rel %   Date Value Ref Range Status   09/14/2024 5.1 (L) 15 - 50 % Final     Lymphocyte %   Date Value Ref Range Status   01/17/2025 31.5 19.6 - 45.3 % Final     Monocyte Rel %   Date Value Ref Range Status   09/14/2024 9.8 0 - 15 % Final     Monocyte %   Date Value Ref Range Status   01/17/2025 10.6 5.0 - 12.0 % Final     Eosinophil %   Date Value Ref Range Status   01/17/2025 1.6 0.3 - 6.2 % Final   09/14/2024 0 0 - 7 % Final     Basophil Rel %   Date Value Ref Range Status   09/14/2024 0.5 0 - 2 % Final     Basophil %   Date Value Ref Range Status   01/17/2025 1.3 0.0 - 1.5 % Final     Immature Grans %   Date Value Ref Range Status   01/17/2025 0.4 0.0 - 0.5 % Final   09/14/2024 0.4 0.0 - 1.0 % Final     Neutrophils Absolute   Date Value Ref Range Status   09/14/2024 8.36 2.0 - 8.8 10*3/uL Final     Neutrophils, Absolute   Date Value Ref Range Status   01/17/2025 3.05 1.70 - 7.00 10*3/mm3 Final     Lymphocytes Absolute   Date Value Ref Range Status   09/14/2024 0.51 (L) 0.7 - 5.5  10*3/uL Final     Lymphocytes, Absolute   Date Value Ref Range Status   01/17/2025 1.76 0.70 - 3.10 10*3/mm3 Final     Monocytes Absolute   Date Value Ref Range Status   09/14/2024 0.97 0.0 - 1.7 10*3/uL Final     Monocytes, Absolute   Date Value Ref Range Status   01/17/2025 0.59 0.10 - 0.90 10*3/mm3 Final     Eosinophils Absolute   Date Value Ref Range Status   09/14/2024 0 0.0 - 0.8 10*3/uL Final     Eosinophils, Absolute   Date Value Ref Range Status   01/17/2025 0.09 0.00 - 0.40 10*3/mm3 Final     Basophils Absolute   Date Value Ref Range Status   09/14/2024 0.05 0.0 - 0.2 10*3/uL Final     Basophils, Absolute   Date Value Ref Range Status   01/17/2025 0.07 0.00 - 0.20 10*3/mm3 Final     Immature Grans, Absolute   Date Value Ref Range Status   01/17/2025 0.02 0.00 - 0.05 10*3/mm3 Final   09/14/2024 0.04 0.00 - 0.10 10*3/uL Final     nRBC   Date Value Ref Range Status   01/17/2025 0.0 0.0 - 0.2 /100 WBC Final   '      Assessment    Diagnoses and all orders for this visit:    1. Mass of axilla, right (Primary)  -     US Axilla Right; Future  -     Ambulatory Referral to General Surgery        Assessment & Plan  1. Axillary mass.  The etiology of the mass remains uncertain, with differential diagnoses including a swollen gland or a lipoma. The mass does not exhibit characteristics of a cyst or abscess  such as heat or redness, It is soft and movable, measuring approximately 2 x 2.5 cm. Labs were done last week at hematology An ultrasound will be ordered to further evaluate the mass. A referral to Dr. Lauren, a general surgeon, will be initiated. She is advised to contact Dr. Lauren's office promptly to schedule an appointment. For pain management, over-the-counter Tylenol is recommended, along with the application of warm compresses to the affected area. She may also alternate between heat and ice as needed. Activity should be limited to her comfort level. If the mass changes size, becomes red or hot, or  begins to drain, she should notify the office        Return if symptoms worsen or fail to improve.    Patient or patient representative verbalized consent for the use of Ambient Listening during the visit with  JORDI Pyle for chart documentation. 1/23/2025  13:58 EST

## 2025-01-27 RX ORDER — VALACYCLOVIR HYDROCHLORIDE 1 G/1
2000 TABLET, FILM COATED ORAL 2 TIMES DAILY PRN
Qty: 12 TABLET | Refills: 1 | Status: SHIPPED | OUTPATIENT
Start: 2025-01-27

## 2025-01-28 ENCOUNTER — HOSPITAL ENCOUNTER (OUTPATIENT)
Dept: ULTRASOUND IMAGING | Facility: HOSPITAL | Age: 70
Discharge: HOME | End: 2025-01-28
Admitting: NURSE PRACTITIONER
Payer: MEDICARE

## 2025-01-28 DIAGNOSIS — R22.31 MASS OF AXILLA, RIGHT: ICD-10-CM

## 2025-01-28 PROCEDURE — 76882 US LMTD JT/FCL EVL NVASC XTR: CPT

## 2025-01-31 ENCOUNTER — TELEPHONE (OUTPATIENT)
Dept: INTERNAL MEDICINE | Facility: CLINIC | Age: 70
End: 2025-01-31
Payer: MEDICARE

## 2025-01-31 NOTE — TELEPHONE ENCOUNTER
Reviewed US of axilla with Dr Ugalde and with patient   The area of palpable concern correlates with a large ovoid 6.2 cm mass,  isoechoic to the adjacent subcutaneous fat, probable benign lipoma. No  concerning shadowing. Could consider correlate with CT if there is persistent clinical concern.  The referral was placed to see general surgery but appt is not scheduled yet. Pt said they were waiting on US results.   Dr ugalde agrees that she would like the patient to see general surgery  She will call Dr Lauren's office and follow up  Pt advised to notify me if any changes in area or any new symptoms arise

## 2025-02-04 RX ORDER — BUPROPION HYDROCHLORIDE 150 MG/1
150 TABLET ORAL EVERY MORNING
Qty: 90 TABLET | Refills: 2 | Status: SHIPPED | OUTPATIENT
Start: 2025-02-04

## 2025-02-14 ENCOUNTER — HOSPITAL ENCOUNTER (OUTPATIENT)
Dept: MAMMOGRAPHY | Facility: HOSPITAL | Age: 70
Discharge: HOME | End: 2025-02-14
Payer: MEDICARE

## 2025-02-14 ENCOUNTER — HOSPITAL ENCOUNTER (OUTPATIENT)
Dept: BONE DENSITY | Facility: HOSPITAL | Age: 70
Discharge: HOME | End: 2025-02-14
Payer: MEDICARE

## 2025-02-14 DIAGNOSIS — Z12.31 ENCOUNTER FOR SCREENING MAMMOGRAM FOR BREAST CANCER: ICD-10-CM

## 2025-02-14 PROCEDURE — 77080 DXA BONE DENSITY AXIAL: CPT

## 2025-02-14 PROCEDURE — 77063 BREAST TOMOSYNTHESIS BI: CPT

## 2025-02-14 PROCEDURE — 77067 SCR MAMMO BI INCL CAD: CPT

## 2025-02-18 ENCOUNTER — OFFICE VISIT (OUTPATIENT)
Dept: SURGERY | Facility: CLINIC | Age: 70
End: 2025-02-18
Payer: MEDICARE

## 2025-02-18 VITALS
HEIGHT: 66 IN | BODY MASS INDEX: 23.46 KG/M2 | SYSTOLIC BLOOD PRESSURE: 98 MMHG | HEART RATE: 72 BPM | OXYGEN SATURATION: 97 % | WEIGHT: 146 LBS | DIASTOLIC BLOOD PRESSURE: 64 MMHG

## 2025-02-18 DIAGNOSIS — D17.21 LIPOMA OF RIGHT AXILLA: Primary | ICD-10-CM

## 2025-02-18 PROCEDURE — 1159F MED LIST DOCD IN RCRD: CPT | Performed by: SURGERY

## 2025-02-18 PROCEDURE — 99214 OFFICE O/P EST MOD 30 MIN: CPT | Performed by: SURGERY

## 2025-02-18 PROCEDURE — 1160F RVW MEDS BY RX/DR IN RCRD: CPT | Performed by: SURGERY

## 2025-02-18 RX ORDER — DIPHENOXYLATE HYDROCHLORIDE AND ATROPINE SULFATE 2.5; .025 MG/1; MG/1
TABLET ORAL DAILY
COMMUNITY

## 2025-02-22 NOTE — PROGRESS NOTES
General Surgery  Established Patient Office Visit    CC: Right axillary mass    HPI: The patient is a pleasant 70 y.o. year-old lady who presents today for evaluation of a mass within her right axilla that she noticed only a few months ago.  At first, it was relatively small but it has rapidly enlarged in size and now measures over 6 cm in diameter.  She mentioned the mass to her primary care provider who ordered an ultrasound that showed this to be a 6.2 cm subcutaneous lipoma.  It has become increasingly more uncomfortable and in particular will cause a great deal of pain after she plays tennis.  Due to the rapid enlargement of the mass and the associated pain, she came to see me to discuss possible lipoma excision.    Past Medical History:   Anxiety with depression  History of low blood pressure  Obstructive sleep apnea     Past Surgical History:   Colonoscopy (Aug 2024 by me - diverticulosis, lymphoid aggregate removed mistaken as a polyp)  Colonoscopy (2018, Dr. Shabazz-diverticulosis)  Rhinoplasty x 2  Oophorectomy    Medications:   Reviewed in epic and include aspirin 81 mg daily    Allergies: Multiple skin allergy test serums (dust mite, corn, Aspergillus, etc.)     Family History: Father with history of colon cancer, nephew with history of colon cancer, cousin with history of ovarian cancer and breast cancer     Social History: , non-smoker, occasional alcohol use socially, plays tennis in her spare time    ROS:  Constitutional: Negative for fevers or chills  HENT: Negative for hearing loss or runny nose  Eyes: Negative for vision changes or scleral icterus  Respiratory: Negative for cough or shortness of breath  Cardiovascular: Negative for chest pain or heart palpitations  Gastrointestinal: Negative for abdominal distension, nausea, vomiting, constipation, melena, or hematochezia  Genitourinary: Negative for hematuria or dysuria  Musculoskeletal: Negative for joint swelling or gait  instability  Neurologic: Negative for tremors or seizures  Psychiatric: Negative for suicidal ideations or agitation  All other systems reviewed and negative    Physical Exam:  Height: 168 cm  Weight: 66 kg  BMI: 23.5  General: No acute distress, well-nourished & well-developed  HEAD: normocephalic, atraumatic  EYES: normal conjunctiva, sclera anicteric  EARS: grossly normal hearing  NECK: supple, no thyromegaly  CARDIOVASCULAR: regular rate and rhythm  RESPIRATORY: clear to auscultation bilaterally  GASTROINTESTINAL: soft, nontender, non-distended  MUSCULOSKELETAL: Mobile subcutaneous mass measuring about 6 to 7 cm in diameter within the posterior right axilla consistent with a lipoma  PSYCHIATRIC: oriented x3, normal mood and affect    BMI is within normal parameters. No other follow-up for BMI required.    IMAGING:  US RIGHT AXILLA (Jan 2025):  IMPRESSION:  The area of palpable concern correlates with a large ovoid 6.2 cm mass, isoechoic to the adjacent subcutaneous fat, probable benign lipoma. No concerning shadowing. Could consider correlate with CT if there is persistent clinical concern.    ASSESSMENT & PLAN  Mrs. Farr is a 70-year-old lady with an enlarging 6 cm subcutaneous lipoma of the right axilla.  Given the rapid enlargement and pain associated with her lipoma I would recommend she consider undergoing removal of this in the operating room under local/MAC anesthesia.  I reviewed her ultrasound and showed her the images.  This appears to be a superficial lipoma based on preoperative imaging workup.  I discussed with her the risks of lipoma removal from the right axilla.  These risks include but are not limited to bleeding, wound infection, lipoma recurrence, and possible damage to surrounding structures such as the long thoracic nerve.  Despite these risks, she has consented to proceed and should hold her aspirin for 5 days beforehand.    Rosa Lauren MD  General, Robotic, and Endoscopic  Surgery  [facility]    [address], [address]  [place] [id number]  P: [phone]  F: [phone]

## 2025-02-22 NOTE — H&P (VIEW-ONLY)
General Surgery  Established Patient Office Visit    CC: Right axillary mass    HPI: The patient is a pleasant 70 y.o. year-old lady who presents today for evaluation of a mass within her right axilla that she noticed only a few months ago.  At first, it was relatively small but it has rapidly enlarged in size and now measures over 6 cm in diameter.  She mentioned the mass to her primary care provider who ordered an ultrasound that showed this to be a 6.2 cm subcutaneous lipoma.  It has become increasingly more uncomfortable and in particular will cause a great deal of pain after she plays tennis.  Due to the rapid enlargement of the mass and the associated pain, she came to see me to discuss possible lipoma excision.    Past Medical History:   Anxiety with depression  History of low blood pressure  Obstructive sleep apnea     Past Surgical History:   Colonoscopy (Aug 2024 by me - diverticulosis, lymphoid aggregate removed mistaken as a polyp)  Colonoscopy (2018, Dr. Shabazz-diverticulosis)  Rhinoplasty x 2  Oophorectomy    Medications:   Reviewed in epic and include aspirin 81 mg daily    Allergies: Multiple skin allergy test serums (dust mite, corn, Aspergillus, etc.)     Family History: Father with history of colon cancer, nephew with history of colon cancer, cousin with history of ovarian cancer and breast cancer     Social History: , non-smoker, occasional alcohol use socially, plays tennis in her spare time    ROS:  Constitutional: Negative for fevers or chills  HENT: Negative for hearing loss or runny nose  Eyes: Negative for vision changes or scleral icterus  Respiratory: Negative for cough or shortness of breath  Cardiovascular: Negative for chest pain or heart palpitations  Gastrointestinal: Negative for abdominal distension, nausea, vomiting, constipation, melena, or hematochezia  Genitourinary: Negative for hematuria or dysuria  Musculoskeletal: Negative for joint swelling or gait  instability  Neurologic: Negative for tremors or seizures  Psychiatric: Negative for suicidal ideations or agitation  All other systems reviewed and negative    Physical Exam:  Height: 168 cm  Weight: 66 kg  BMI: 23.5  General: No acute distress, well-nourished & well-developed  HEAD: normocephalic, atraumatic  EYES: normal conjunctiva, sclera anicteric  EARS: grossly normal hearing  NECK: supple, no thyromegaly  CARDIOVASCULAR: regular rate and rhythm  RESPIRATORY: clear to auscultation bilaterally  GASTROINTESTINAL: soft, nontender, non-distended  MUSCULOSKELETAL: Mobile subcutaneous mass measuring about 6 to 7 cm in diameter within the posterior right axilla consistent with a lipoma  PSYCHIATRIC: oriented x3, normal mood and affect    BMI is within normal parameters. No other follow-up for BMI required.    IMAGING:  US RIGHT AXILLA (Jan 2025):  IMPRESSION:  The area of palpable concern correlates with a large ovoid 6.2 cm mass, isoechoic to the adjacent subcutaneous fat, probable benign lipoma. No concerning shadowing. Could consider correlate with CT if there is persistent clinical concern.    ASSESSMENT & PLAN  Mrs. Farr is a 70-year-old lady with an enlarging 6 cm subcutaneous lipoma of the right axilla.  Given the rapid enlargement and pain associated with her lipoma I would recommend she consider undergoing removal of this in the operating room under local/MAC anesthesia.  I reviewed her ultrasound and showed her the images.  This appears to be a superficial lipoma based on preoperative imaging workup.  I discussed with her the risks of lipoma removal from the right axilla.  These risks include but are not limited to bleeding, wound infection, lipoma recurrence, and possible damage to surrounding structures such as the long thoracic nerve.  Despite these risks, she has consented to proceed and should hold her aspirin for 5 days beforehand.    Rosa Lauren MD  General, Robotic, and Endoscopic  Surgery  [facility]    [address], [address]  [place] [id number]  P: [phone]  F: [phone]

## 2025-03-06 ENCOUNTER — TELEPHONE (OUTPATIENT)
Dept: SURGERY | Facility: CLINIC | Age: 70
End: 2025-03-06
Payer: MEDICARE

## 2025-03-06 ENCOUNTER — PRE-ADMISSION TESTING (OUTPATIENT)
Dept: PREADMISSION TESTING | Facility: HOSPITAL | Age: 70
End: 2025-03-06
Payer: MEDICARE

## 2025-03-06 VITALS
HEIGHT: 67 IN | DIASTOLIC BLOOD PRESSURE: 68 MMHG | OXYGEN SATURATION: 98 % | TEMPERATURE: 96.7 F | RESPIRATION RATE: 18 BRPM | HEART RATE: 66 BPM | WEIGHT: 146 LBS | SYSTOLIC BLOOD PRESSURE: 109 MMHG | BODY MASS INDEX: 22.91 KG/M2

## 2025-03-06 LAB
ANION GAP SERPL CALCULATED.3IONS-SCNC: 9.6 MMOL/L (ref 5–15)
BUN SERPL-MCNC: 19 MG/DL (ref 8–23)
BUN/CREAT SERPL: 19.8 (ref 7–25)
CALCIUM SPEC-SCNC: 9.4 MG/DL (ref 8.6–10.5)
CHLORIDE SERPL-SCNC: 106 MMOL/L (ref 98–107)
CO2 SERPL-SCNC: 26.4 MMOL/L (ref 22–29)
CREAT SERPL-MCNC: 0.96 MG/DL (ref 0.57–1)
DEPRECATED RDW RBC AUTO: 45.3 FL (ref 37–54)
EGFRCR SERPLBLD CKD-EPI 2021: 63 ML/MIN/1.73
ERYTHROCYTE [DISTWIDTH] IN BLOOD BY AUTOMATED COUNT: 13.8 % (ref 12.3–15.4)
GLUCOSE SERPL-MCNC: 46 MG/DL (ref 65–99)
HCT VFR BLD AUTO: 48.8 % (ref 34–46.6)
HGB BLD-MCNC: 15.7 G/DL (ref 12–15.9)
MCH RBC QN AUTO: 29 PG (ref 26.6–33)
MCHC RBC AUTO-ENTMCNC: 32.2 G/DL (ref 31.5–35.7)
MCV RBC AUTO: 90 FL (ref 79–97)
PLATELET # BLD AUTO: 399 10*3/MM3 (ref 140–450)
PMV BLD AUTO: 10.3 FL (ref 6–12)
POTASSIUM SERPL-SCNC: 4.5 MMOL/L (ref 3.5–5.2)
QT INTERVAL: 433 MS
QTC INTERVAL: 430 MS
RBC # BLD AUTO: 5.42 10*6/MM3 (ref 3.77–5.28)
SODIUM SERPL-SCNC: 142 MMOL/L (ref 136–145)
WBC NRBC COR # BLD AUTO: 6.33 10*3/MM3 (ref 3.4–10.8)

## 2025-03-06 PROCEDURE — 93005 ELECTROCARDIOGRAM TRACING: CPT

## 2025-03-06 PROCEDURE — 85027 COMPLETE CBC AUTOMATED: CPT

## 2025-03-06 PROCEDURE — 93010 ELECTROCARDIOGRAM REPORT: CPT | Performed by: INTERNAL MEDICINE

## 2025-03-06 PROCEDURE — 36415 COLL VENOUS BLD VENIPUNCTURE: CPT

## 2025-03-06 PROCEDURE — 80048 BASIC METABOLIC PNL TOTAL CA: CPT

## 2025-03-06 NOTE — TELEPHONE ENCOUNTER
----- Message from Rosa Lauren sent at 3/6/2025 10:59 AM EST -----  Can someone please call Una and let her know the glucose measurement was very low on her blood work this morning (46). I would encourage her if she has not done so already to eat or drink something high in sugar (orange juice is a good option) to bring her blood sugar up into safer range.    Thanks,  LOW

## 2025-03-06 NOTE — TELEPHONE ENCOUNTER
I spoke with pt and relayed the message Dr. Lauren sent. Pt expressed concern for her blood sugar being low. I asked if pt fasted for her labs and she said no. I told pt that if she is concerned about potentially having blood sugar issues, to reach out to her PCP. I expressed how Dr. Lauren suggested she eat something high in sugar and that should help. Pt voiced understanding.

## 2025-03-06 NOTE — DISCHARGE INSTRUCTIONS
Take the following medications the morning of surgery:    WELLBUTRIN      If you are on prescription narcotic pain medication to control your pain you may also take that medication the morning of surgery.      General Instructions:     Do not eat solid food after midnight the night before surgery.  Clear liquids day of surgery are allowed but must be stopped at least two hours before your hospital arrival time.       Allowed clear liquids      Water, sodas, and tea or coffee with no cream or milk added.       12 to 20 ounces of a clear liquid that contains carbohydrates is recommended.  If non-diabetic, have Gatorade or Powerade.  If diabetic, have G2 or Powerade Zero.     Do not have liquids red in color.  Do not consume chicken, beef, pork or vegetable broth or bouillon cubes of any variety as they are not considered clear liquids and are not allowed.      Infants may have breast milk up to four hours before surgery.  Infants drinking formula may drink formula up to six hours before surgery.   Patients who avoid smoking, chewing tobacco and alcohol for 4 weeks prior to surgery have a reduced risk of post-operative complications.  Quit smoking as many days before surgery as you can.  Do not smoke, use chewing tobacco or drink alcohol the day of surgery.   If applicable bring your C-PAP/ BI-PAP machine in with you to preop day of surgery.  Bring any papers given to you in the doctor’s office.  Wear clean comfortable clothes.  Do not wear contact lenses, false eyelashes or make-up.  Bring a case for your glasses.   Bring crutches or walker if applicable.  Remove all piercings.  Leave jewelry and any other valuables at home.  Hair extensions with metal clips must be removed prior to surgery.  The Pre-Admission Testing nurse will instruct you to bring medications if unable to obtain an accurate list in Pre-Admission Testing.    Day of surgery you will need to let the preoperative nurse know the last time you took each  of your medications.  To ensure a safe environment for patients and staff, we kindly ask that children under the age of 16 not accompany patients.  If you must bring a dependent child or dependent adult please ensure a responsible adult, other than yourself, is present to supervise them.          Preventing a Surgical Site Infection:  For 2 to 3 days before surgery, avoid shaving with a razor because the razor can irritate skin and make it easier to develop an infection.    Any areas of open skin can increase the risk of a post-operative wound infection by allowing bacteria to enter and travel throughout the body.  Notify your surgeon if you have any skin wounds / rashes even if it is not near the expected surgical site.  The area will need assessed to determine if surgery should be delayed until it is healed.  The night prior to surgery shower using a fresh bar of anti-bacterial soap (such as Dial) and clean washcloth.  Sleep in a clean bed with clean clothing.  Do not allow pets to sleep with you.  Shower on the morning of surgery using a fresh bar of anti-bacterial soap (such as Dial) and clean washcloth.  Dry with a clean towel and dress in clean clothing.  Ask your surgeon if you will be receiving antibiotics prior to surgery.  Make sure you, your family, and all healthcare providers clean their hands with soap and water or an alcohol based hand  before caring for you or your wound.    Day of surgery:  Your arrival time is approximately two hours before your scheduled surgery time.  Please note if you have an early arrival time the surgery doors do not open before 5:00 AM.  Upon arrival, a Pre-op nurse and Anesthesiologist will review your health history, obtain vital signs, and answer questions you may have.  The only belongings needed at this time will be a list of your home medications and if applicable your C-PAP/BI-PAP machine.  A Pre-op nurse will start an IV and you may receive medication in  preparation for surgery, including something to help you relax.     Please be aware that surgery does come with discomfort.  We want to make every effort to control your discomfort so please discuss any uncontrolled symptoms with your nurse.   Your doctor will most likely have prescribed pain medications.      If you are going home after surgery you will receive individualized written care instructions before being discharged.  A responsible adult must drive you to and from the hospital on the day of your surgery and ideally stay with you through the night.   .  Discharge prescriptions can be filled by the hospital pharmacy during regular pharmacy hours.  If you are having surgery late in the day/evening your prescription may be e-prescribed to your pharmacy.  Please verify your pharmacy hours or chose a 24 hour pharmacy to avoid not having access to your prescription because your pharmacy has closed for the day.    If you are staying overnight following surgery, you will be transported to your hospital room following the recovery period.  [facility] has all private rooms.    If you have any questions please call Pre-Admission Testing at [phone].  Deductibles and co-payments are collected on the day of service. Please be prepared to pay the required co-pay, deductible or deposit on the day of service as defined by your plan.    Call your surgeon immediately if you experience any of the following symptoms:  Sore Throat  Shortness of Breath or difficulty breathing  Cough  Chills  Body soreness or muscle pain  Headache  Fever  New loss of taste or smell  Do not arrive for your surgery ill.  Your procedure will need to be rescheduled to another time.  You will need to call your physician before the day of surgery to avoid any unnecessary exposure to hospital staff as well as other patients.

## 2025-03-14 DIAGNOSIS — E16.2 HYPOGLYCEMIA: Primary | ICD-10-CM

## 2025-03-18 ENCOUNTER — HOSPITAL ENCOUNTER (OUTPATIENT)
Facility: HOSPITAL | Age: 70
Setting detail: HOSPITAL OUTPATIENT SURGERY
Discharge: HOME | End: 2025-03-18
Attending: SURGERY | Admitting: SURGERY
Payer: MEDICARE

## 2025-03-18 ENCOUNTER — ANESTHESIA (OUTPATIENT)
Dept: PERIOP | Facility: HOSPITAL | Age: 70
End: 2025-03-18
Payer: MEDICARE

## 2025-03-18 ENCOUNTER — ANESTHESIA EVENT (OUTPATIENT)
Dept: PERIOP | Facility: HOSPITAL | Age: 70
End: 2025-03-18
Payer: MEDICARE

## 2025-03-18 VITALS
RESPIRATION RATE: 18 BRPM | DIASTOLIC BLOOD PRESSURE: 67 MMHG | OXYGEN SATURATION: 100 % | TEMPERATURE: 97.7 F | SYSTOLIC BLOOD PRESSURE: 118 MMHG | HEART RATE: 66 BPM

## 2025-03-18 DIAGNOSIS — D17.21 LIPOMA OF RIGHT AXILLA: ICD-10-CM

## 2025-03-18 PROCEDURE — 24071 EXC ARM/ELBOW LES SC 3 CM/>: CPT | Performed by: SURGERY

## 2025-03-18 PROCEDURE — 25010000002 FENTANYL CITRATE (PF) 50 MCG/ML SOLUTION: Performed by: NURSE ANESTHETIST, CERTIFIED REGISTERED

## 2025-03-18 PROCEDURE — 25010000002 MIDAZOLAM PER 1 MG: Performed by: ANESTHESIOLOGY

## 2025-03-18 PROCEDURE — 25010000002 CEFAZOLIN PER 500 MG: Performed by: SURGERY

## 2025-03-18 PROCEDURE — 24071 EXC ARM/ELBOW LES SC 3 CM/>: CPT | Performed by: REGISTERED NURSE

## 2025-03-18 PROCEDURE — 25010000002 PROPOFOL 10 MG/ML EMULSION: Performed by: NURSE ANESTHETIST, CERTIFIED REGISTERED

## 2025-03-18 PROCEDURE — 25010000002 LIDOCAINE 2% SOLUTION: Performed by: NURSE ANESTHETIST, CERTIFIED REGISTERED

## 2025-03-18 PROCEDURE — 88304 TISSUE EXAM BY PATHOLOGIST: CPT | Performed by: SURGERY

## 2025-03-18 PROCEDURE — 25810000003 LACTATED RINGERS PER 1000 ML: Performed by: ANESTHESIOLOGY

## 2025-03-18 RX ORDER — HYDROMORPHONE HYDROCHLORIDE 1 MG/ML
0.25 INJECTION, SOLUTION INTRAMUSCULAR; INTRAVENOUS; SUBCUTANEOUS
Status: DISCONTINUED | OUTPATIENT
Start: 2025-03-18 | End: 2025-03-18 | Stop reason: HOSPADM

## 2025-03-18 RX ORDER — VALACYCLOVIR HYDROCHLORIDE 1 G/1
2000 TABLET, FILM COATED ORAL 2 TIMES DAILY PRN
Qty: 8 TABLET | Refills: 1 | Status: SHIPPED | OUTPATIENT
Start: 2025-03-18

## 2025-03-18 RX ORDER — PROMETHAZINE HYDROCHLORIDE 25 MG/1
25 SUPPOSITORY RECTAL ONCE AS NEEDED
Status: DISCONTINUED | OUTPATIENT
Start: 2025-03-18 | End: 2025-03-18 | Stop reason: HOSPADM

## 2025-03-18 RX ORDER — MIDAZOLAM HYDROCHLORIDE 1 MG/ML
0.5 INJECTION, SOLUTION INTRAMUSCULAR; INTRAVENOUS
Status: DISCONTINUED | OUTPATIENT
Start: 2025-03-18 | End: 2025-03-18 | Stop reason: HOSPADM

## 2025-03-18 RX ORDER — BUPIVACAINE HYDROCHLORIDE AND EPINEPHRINE 5; 5 MG/ML; UG/ML
INJECTION, SOLUTION EPIDURAL; INTRACAUDAL; PERINEURAL AS NEEDED
Status: DISCONTINUED | OUTPATIENT
Start: 2025-03-18 | End: 2025-03-18 | Stop reason: HOSPADM

## 2025-03-18 RX ORDER — CHOLECALCIFEROL (VITAMIN D3) 25 MCG
2000 TABLET ORAL DAILY
COMMUNITY

## 2025-03-18 RX ORDER — FENTANYL CITRATE 50 UG/ML
INJECTION, SOLUTION INTRAMUSCULAR; INTRAVENOUS AS NEEDED
Status: DISCONTINUED | OUTPATIENT
Start: 2025-03-18 | End: 2025-03-18 | Stop reason: SURG

## 2025-03-18 RX ORDER — DROPERIDOL 2.5 MG/ML
0.62 INJECTION, SOLUTION INTRAMUSCULAR; INTRAVENOUS
Status: DISCONTINUED | OUTPATIENT
Start: 2025-03-18 | End: 2025-03-18 | Stop reason: HOSPADM

## 2025-03-18 RX ORDER — EPHEDRINE SULFATE 50 MG/ML
5 INJECTION, SOLUTION INTRAVENOUS ONCE AS NEEDED
Status: DISCONTINUED | OUTPATIENT
Start: 2025-03-18 | End: 2025-03-18 | Stop reason: HOSPADM

## 2025-03-18 RX ORDER — SODIUM CHLORIDE 0.9 % (FLUSH) 0.9 %
3 SYRINGE (ML) INJECTION EVERY 12 HOURS SCHEDULED
Status: DISCONTINUED | OUTPATIENT
Start: 2025-03-18 | End: 2025-03-18 | Stop reason: HOSPADM

## 2025-03-18 RX ORDER — LIDOCAINE HYDROCHLORIDE 10 MG/ML
0.5 INJECTION, SOLUTION INFILTRATION; PERINEURAL ONCE AS NEEDED
Status: DISCONTINUED | OUTPATIENT
Start: 2025-03-18 | End: 2025-03-18 | Stop reason: HOSPADM

## 2025-03-18 RX ORDER — LABETALOL HYDROCHLORIDE 5 MG/ML
5 INJECTION, SOLUTION INTRAVENOUS
Status: DISCONTINUED | OUTPATIENT
Start: 2025-03-18 | End: 2025-03-18 | Stop reason: HOSPADM

## 2025-03-18 RX ORDER — IBUPROFEN 800 MG/1
800 TABLET, FILM COATED ORAL EVERY 8 HOURS PRN
Qty: 10 TABLET | Refills: 0 | Status: SHIPPED | OUTPATIENT
Start: 2025-03-18 | End: 2025-03-27

## 2025-03-18 RX ORDER — SODIUM CHLORIDE, SODIUM LACTATE, POTASSIUM CHLORIDE, CALCIUM CHLORIDE 600; 310; 30; 20 MG/100ML; MG/100ML; MG/100ML; MG/100ML
9 INJECTION, SOLUTION INTRAVENOUS CONTINUOUS
Status: DISCONTINUED | OUTPATIENT
Start: 2025-03-18 | End: 2025-03-18 | Stop reason: HOSPADM

## 2025-03-18 RX ORDER — SODIUM CHLORIDE 0.9 % (FLUSH) 0.9 %
3-10 SYRINGE (ML) INJECTION AS NEEDED
Status: DISCONTINUED | OUTPATIENT
Start: 2025-03-18 | End: 2025-03-18 | Stop reason: HOSPADM

## 2025-03-18 RX ORDER — IPRATROPIUM BROMIDE AND ALBUTEROL SULFATE 2.5; .5 MG/3ML; MG/3ML
3 SOLUTION RESPIRATORY (INHALATION) ONCE AS NEEDED
Status: DISCONTINUED | OUTPATIENT
Start: 2025-03-18 | End: 2025-03-18 | Stop reason: HOSPADM

## 2025-03-18 RX ORDER — ONDANSETRON 2 MG/ML
4 INJECTION INTRAMUSCULAR; INTRAVENOUS ONCE AS NEEDED
Status: DISCONTINUED | OUTPATIENT
Start: 2025-03-18 | End: 2025-03-18 | Stop reason: HOSPADM

## 2025-03-18 RX ORDER — ATROPINE SULFATE 0.4 MG/ML
0.4 INJECTION, SOLUTION INTRAMUSCULAR; INTRAVENOUS; SUBCUTANEOUS ONCE AS NEEDED
Status: DISCONTINUED | OUTPATIENT
Start: 2025-03-18 | End: 2025-03-18 | Stop reason: HOSPADM

## 2025-03-18 RX ORDER — LIDOCAINE HYDROCHLORIDE 20 MG/ML
INJECTION, SOLUTION INFILTRATION; PERINEURAL AS NEEDED
Status: DISCONTINUED | OUTPATIENT
Start: 2025-03-18 | End: 2025-03-18 | Stop reason: SURG

## 2025-03-18 RX ORDER — HYDROCODONE BITARTRATE AND ACETAMINOPHEN 5; 325 MG/1; MG/1
1 TABLET ORAL ONCE AS NEEDED
Status: DISCONTINUED | OUTPATIENT
Start: 2025-03-18 | End: 2025-03-18 | Stop reason: HOSPADM

## 2025-03-18 RX ORDER — DIPHENHYDRAMINE HYDROCHLORIDE 50 MG/ML
12.5 INJECTION, SOLUTION INTRAMUSCULAR; INTRAVENOUS
Status: DISCONTINUED | OUTPATIENT
Start: 2025-03-18 | End: 2025-03-18 | Stop reason: HOSPADM

## 2025-03-18 RX ORDER — FLUMAZENIL 0.1 MG/ML
0.2 INJECTION INTRAVENOUS AS NEEDED
Status: DISCONTINUED | OUTPATIENT
Start: 2025-03-18 | End: 2025-03-18 | Stop reason: HOSPADM

## 2025-03-18 RX ORDER — MAGNESIUM HYDROXIDE 1200 MG/15ML
LIQUID ORAL AS NEEDED
Status: DISCONTINUED | OUTPATIENT
Start: 2025-03-18 | End: 2025-03-18 | Stop reason: HOSPADM

## 2025-03-18 RX ORDER — HYDRALAZINE HYDROCHLORIDE 20 MG/ML
5 INJECTION INTRAMUSCULAR; INTRAVENOUS
Status: DISCONTINUED | OUTPATIENT
Start: 2025-03-18 | End: 2025-03-18 | Stop reason: HOSPADM

## 2025-03-18 RX ORDER — FENTANYL CITRATE 50 UG/ML
25 INJECTION, SOLUTION INTRAMUSCULAR; INTRAVENOUS
Status: DISCONTINUED | OUTPATIENT
Start: 2025-03-18 | End: 2025-03-18 | Stop reason: HOSPADM

## 2025-03-18 RX ORDER — FAMOTIDINE 10 MG/ML
20 INJECTION, SOLUTION INTRAVENOUS ONCE
Status: COMPLETED | OUTPATIENT
Start: 2025-03-18 | End: 2025-03-18

## 2025-03-18 RX ORDER — FENTANYL CITRATE 50 UG/ML
50 INJECTION, SOLUTION INTRAMUSCULAR; INTRAVENOUS ONCE AS NEEDED
Status: DISCONTINUED | OUTPATIENT
Start: 2025-03-18 | End: 2025-03-18 | Stop reason: HOSPADM

## 2025-03-18 RX ORDER — PROMETHAZINE HYDROCHLORIDE 25 MG/1
25 TABLET ORAL ONCE AS NEEDED
Status: DISCONTINUED | OUTPATIENT
Start: 2025-03-18 | End: 2025-03-18 | Stop reason: HOSPADM

## 2025-03-18 RX ORDER — NALOXONE HCL 0.4 MG/ML
0.2 VIAL (ML) INJECTION AS NEEDED
Status: DISCONTINUED | OUTPATIENT
Start: 2025-03-18 | End: 2025-03-18 | Stop reason: HOSPADM

## 2025-03-18 RX ORDER — HYDROCODONE BITARTRATE AND ACETAMINOPHEN 7.5; 325 MG/1; MG/1
1 TABLET ORAL EVERY 4 HOURS PRN
Status: DISCONTINUED | OUTPATIENT
Start: 2025-03-18 | End: 2025-03-18 | Stop reason: HOSPADM

## 2025-03-18 RX ADMIN — SODIUM CHLORIDE, SODIUM LACTATE, POTASSIUM CHLORIDE, CALCIUM CHLORIDE 9 ML/HR: 20; 30; 600; 310 INJECTION, SOLUTION INTRAVENOUS at 13:02

## 2025-03-18 RX ADMIN — LIDOCAINE HYDROCHLORIDE 40 MG: 20 INJECTION, SOLUTION INFILTRATION; PERINEURAL at 13:58

## 2025-03-18 RX ADMIN — PROPOFOL 75 MCG/KG/MIN: 10 INJECTION, EMULSION INTRAVENOUS at 13:58

## 2025-03-18 RX ADMIN — CEFAZOLIN 2000 MG: 2 INJECTION, POWDER, FOR SOLUTION INTRAMUSCULAR; INTRAVENOUS at 13:47

## 2025-03-18 RX ADMIN — MIDAZOLAM 0.5 MG: 1 INJECTION INTRAMUSCULAR; INTRAVENOUS at 13:00

## 2025-03-18 RX ADMIN — FAMOTIDINE 20 MG: 10 INJECTION INTRAVENOUS at 13:00

## 2025-03-18 RX ADMIN — FENTANYL CITRATE 50 MCG: 50 INJECTION, SOLUTION INTRAMUSCULAR; INTRAVENOUS at 14:07

## 2025-03-18 NOTE — OP NOTE
Operative Note :  Rosa Lauren MD      Fred Desailow  1955    Procedure Date: 03/18/25    Pre-op Diagnosis:  Lipoma of right axilla [D17.21]    Post-Operative Diagnosis:  Lipoma of right axilla [D17.21]    Procedure:   Excision 6 cm deep subcutaneous right axillary lipoma    Surgeon: Rosa Lauren MD    Assistant: JOSSIE Christianson (Perla was responsible for suctioning, retracting, suturing of all surgical incisions, and application of sterile dressings at the completion of the case)    Anesthesia:  MAC (monitored anesthetic care)    Estimated Blood Loss: minimal    Specimens: Right axillary lipoma    Complications: None    Indications:  The patient is a 70-year-old lady who has developed an enlarging mass of the right axilla concerning for a subcutaneous lipoma.  I have recommended proceeding with surgical excision of this in the operating room today under local/MAC anesthesia.  She has been counseled on the risks of the procedure which include but not limited to bleeding, wound infection, lipoma recurrence, and possible damage to structures around the axilla such as the long thoracic nerve.  Despite these risks, she has consented to proceed.    Findings: 6 cm x 5 cm deep subcutaneous lipoma of right axilla    Description of procedure:  The patient was brought to the operating room and placed on the OR table in the left lateral decubitus position.  Her right arm was supported on pillows positioned over a Lovell stand.  Continuous propofol anesthesia was induced and the right axilla prepped and draped in a sterile fashion.  A surgical timeout was completed.  The skin and soft tissues overlying the lipoma were anesthetized using 0.5% Marcaine with epinephrine.  A transverse skin incision was made overlying the lipoma and the underlying subcutaneous fat divided to a fibrous capsule deep within the subcutaneous fat.  This was incised sharply and there was a 6 cm x 5 cm underlying lipoma that was slowly  eviscerated from with surrounding soft tissue attachments.  It was passed off to pathology in formalin.  The subcutaneous pocket was inspected and appeared hemostatic.  There were no remaining lipoma fragments appreciable.  The incision was then closed primarily using interrupted 3-0 Vicryl deep dermal suture, running 4-0 Vicryl subcuticular suture, and topical Exofin glue.  She then returned to supine positioning and transferred to the recovery room in stable condition with all counts correct per nursing.    Rosa Lauren MD  General, Robotic, and Endoscopic Surgery  [facility]    [address] [place] [address], [address]  [place] [address]  P: [phone]  F: [phone]

## 2025-03-18 NOTE — ANESTHESIA POSTPROCEDURE EVALUATION
Patient: Fred Farr    Procedure Summary       Date: 03/18/25 Room / Location: [facility] OR [address] [place] [address] MAIN OR    Anesthesia Start: 1351 Anesthesia Stop: 1436    Procedure: Excision of right axillary lipoma (Right: Arm Upper) Diagnosis:       Lipoma of right axilla      (Lipoma of right axilla [D17.21])    Surgeons: Rosa Lauren MD Provider: Yuriy Murray MD    Anesthesia Type: MAC ASA Status: 3            Anesthesia Type: MAC    Vitals  Vitals Value Taken Time   /67 03/18/25 14:50   Temp 36.5 °C (97.7 °F) 03/18/25 14:30   Pulse 63 03/18/25 14:53   Resp 18 03/18/25 14:50   SpO2 98 % 03/18/25 14:53   Vitals shown include unfiled device data.        Post Anesthesia Care and Evaluation    Pain management: adequate    Airway patency: patent  Anesthetic complications: No anesthetic complications    Cardiovascular status: acceptable  Respiratory status: acceptable  Hydration status: acceptable    Comments: */67   Pulse 66   Temp 36.5 °C (97.7 °F) (Oral)   Resp 18   SpO2 100%

## 2025-03-18 NOTE — ANESTHESIA PREPROCEDURE EVALUATION
Anesthesia Evaluation     Patient summary reviewed and Nursing notes reviewed   NPO Solid Status: > 8 hours  NPO Liquid Status: > 4 hours           Airway   Mallampati: II  Neck ROM: full  No difficulty expected  Dental - normal exam     Pulmonary     breath sounds clear to auscultation  (+) ,sleep apnea  Cardiovascular     Rhythm: regular    (+) valvular problems/murmurs, dysrhythmias    ROS comment: RBBB    Neuro/Psych  (+) psychiatric history Anxiety  GI/Hepatic/Renal/Endo      Musculoskeletal     Abdominal    Substance History      OB/GYN          Other   blood dyscrasia (polycythemia),   history of cancer                Anesthesia Plan    ASA 3     MAC     intravenous induction     Anesthetic plan, risks, benefits, and alternatives have been provided, discussed and informed consent has been obtained with: patient.    CODE STATUS:

## 2025-03-19 LAB
CYTO UR: NORMAL
LAB AP CASE REPORT: NORMAL
PATH REPORT.FINAL DX SPEC: NORMAL
PATH REPORT.GROSS SPEC: NORMAL

## 2025-03-23 ENCOUNTER — TELEMEDICINE (OUTPATIENT)
Dept: FAMILY MEDICINE CLINIC | Facility: TELEHEALTH | Age: 70
End: 2025-03-23
Payer: MEDICARE

## 2025-03-23 DIAGNOSIS — R39.89 SUSPECTED UTI: Primary | ICD-10-CM

## 2025-03-23 RX ORDER — PHENAZOPYRIDINE HYDROCHLORIDE 200 MG/1
200 TABLET, FILM COATED ORAL 3 TIMES DAILY PRN
Qty: 6 TABLET | Refills: 0 | Status: SHIPPED | OUTPATIENT
Start: 2025-03-23 | End: 2025-03-25

## 2025-03-23 RX ORDER — NITROFURANTOIN 25; 75 MG/1; MG/1
100 CAPSULE ORAL 2 TIMES DAILY
Qty: 14 CAPSULE | Refills: 0 | Status: SHIPPED | OUTPATIENT
Start: 2025-03-23 | End: 2025-03-30

## 2025-03-23 NOTE — PROGRESS NOTES
You have chosen to receive care through a telehealth visit.  Do you consent to use a video/audio connection for your medical care today? Yes     Patient or patient representative verbalized consent for the use of Ambient Listening during the visit with  JORDI Joiner for chart documentation. 3/23/2025  12:56 EDT    CHIEF COMPLAINT  No chief complaint on file.        HPI  History of Present Illness  The patient is a 70-year-old female presenting with complaints of UTI symptoms. She is accompanied by her .    She has been asymptomatic for an extended period, attributing this to her daily intake of cranberry extract. However, she experienced a sudden onset of urinary urgency this morning, which has persisted throughout the day, causing significant discomfort. She expresses concern about the potential exacerbation of the infection due to delayed urination. She recalls a similar incident last night when she felt the urge to urinate but chose to delay it. She has a history of urinary tract infections, previously managed with Cipro, Bactrim, and Augmentin. She is uncertain about her past use of Macrobid.    MEDICATIONS  Current: cranberry extract       Review of Systems  See HPI    Past Medical History:   Diagnosis Date    Allergic 2013    Recently tested--mild allergies    Anxiety     Benign head tremor     Colon polyp Unsure    Depression     Diverticulitis of colon Unsure    Encounter for screening for malignant neoplasm of colon 01/18/2018    Heart murmur     History of COVID-19     HL (hearing loss) 2000    Lipoma of axilla     RIGHT    Low blood pressure     FERDINAND (obstructive sleep apnea) 05/24/2021    Home sleep study.  Mild FERDINAND with AHI 8 events per hour.  No sleep-related hypoxia.  The patient snored 2.5% of total monitoring time.    Polycythemia     Right bundle branch block     Skin cancer     Sleep apnea     dental appliance       Family History   Problem Relation Age of Onset    Heart disease  Mother     Arthritis Mother     Cancer Father         Colon cancer    Macular degeneration Father     Vision loss Father     Asthma Sister     Alcohol abuse Sister     Vision loss Sister     Hypothyroidism Brother     Macular degeneration Brother     Thyroid disease Brother     Cancer Brother         Skin cancer    Stroke Paternal Grandfather     Breast cancer Cousin     Ovarian cancer Cousin     Stroke Maternal Grandfather     Heart disease Brother     Vision loss Brother     Malig Hyperthermia Neg Hx        Social History     Socioeconomic History    Marital status:      Spouse name: Rodney   Tobacco Use    Smoking status: Never    Smokeless tobacco: Never   Vaping Use    Vaping status: Never Used   Substance and Sexual Activity    Alcohol use: Not Currently     Comment: Occasionally drink a hard cider--once per 2 months    Drug use: No    Sexual activity: Yes     Partners: Male     Birth control/protection: Post-menopausal       Fred Farr  reports that she has never smoked. She has never used smokeless tobacco.             There were no vitals taken for this visit.    PHYSICAL EXAM  Physical Exam   Constitutional: She is oriented to person, place, and time. She appears well-developed and well-nourished. She does not have a sickly appearance. She does not appear ill.   HENT:   Head: Normocephalic and atraumatic.   Pulmonary/Chest: Effort normal.  No respiratory distress.  Neurological: She is alert and oriented to person, place, and time.         Diagnoses and all orders for this visit:    1. Suspected UTI (Primary)  -     nitrofurantoin, macrocrystal-monohydrate, (MACROBID) 100 MG capsule; Take 1 capsule by mouth 2 (Two) Times a Day for 7 days.  Dispense: 14 capsule; Refill: 0  -     phenazopyridine (PYRIDIUM) 200 MG tablet; Take 1 tablet by mouth 3 (Three) Times a Day As Needed for Bladder Spasms for up to 2 days.  Dispense: 6 tablet; Refill: 0    --take medications as prescribed  --increase fluids,  rest as needed, tylenol or ibuprofen for pain  --f/u in 2-3 days if no improvement      Assessment & Plan  1. Urinary Tract Infection (UTI).  She reports symptoms of urgency and discomfort, indicative of a UTI. A prescription for nitrofurantoin 100 mg, to be taken twice daily for 7 days, has been issued. Additionally, Pyridium 200 mg has been prescribed to be taken every 8 hours as needed for symptomatic relief. The prescriptions will be sent to [facility] on [facility] at the corner of [address].         FOLLOW-UP  As discussed during visit with PCP/[facility] if no improvement or Urgent Care/Emergency Department if worsening of symptoms    Patient verbalizes understanding of medication dosage, comfort measures, instructions for treatment and follow-up.    Kristin Hester, APRN  03/23/2025  12:56 EDT    Mode of Visit: Video  Location of patient: -HOME-  Location of provider: +HOME+  You have chosen to receive care through a telehealth visit.  The patient has signed the video visit consent form.  The visit included audio and video interaction. No technical issues occurred during this visit.    The use of a video visit has been reviewed with the patient and verbal informed consent has been obtained. Myself and Fred Farr     participated in this visit. The patient is located in [address] [place] [address]  I am located in [place], [place]. [employer] and [employer] were utilized. I spent 3 minutes in the patient's chart for this visit.      Note Disclaimer: At [facility], we believe that sharing information builds trust and better   relationships. You are receiving this note because you recently visited [facility]. It is possible you   will see health information before a provider has talked with you about it. This kind of information can   be easy to misunderstand. To help you fully understand what it means for your health, we urge you to   discuss this note with your provider.

## 2025-03-27 ENCOUNTER — OFFICE VISIT (OUTPATIENT)
Dept: SURGERY | Facility: CLINIC | Age: 70
End: 2025-03-27
Payer: MEDICARE

## 2025-03-27 VITALS
DIASTOLIC BLOOD PRESSURE: 78 MMHG | OXYGEN SATURATION: 97 % | HEIGHT: 67 IN | SYSTOLIC BLOOD PRESSURE: 108 MMHG | BODY MASS INDEX: 22.85 KG/M2 | HEART RATE: 73 BPM | WEIGHT: 145.6 LBS

## 2025-03-27 DIAGNOSIS — Z09 SURGICAL FOLLOWUP: Primary | ICD-10-CM

## 2025-03-27 PROCEDURE — 99024 POSTOP FOLLOW-UP VISIT: CPT | Performed by: SURGERY

## 2025-03-27 NOTE — PROGRESS NOTES
CHIEF COMPLAINT:   Chief Complaint   Patient presents with    Post-op     Excision 6 cm deep subcutaneous right axillary lipoma 3/18/25       HISTORY OF PRESENT ILLNESS:  This is a 70 y.o. female who presents for a post-operative visit after undergoing excision right axillary lipoma on 3/18/2025.  She has been doing well since surgery with no fever, chills, or drainage from her incision.  There has been some swelling of the incision and mild right shoulder discomfort, but no significant mobility limitations of the right upper extremity.    Pathology:   Soft tissue, right axilla, excision: Lipoma     PHYSICAL EXAM:  Lungs: Clear  Heart: RRR  ABD: Soft, nontender, nondistended  Ext: Right posterior axillary incision healing well without erythema, fluctuance, or soft tissue induration  BMI is within normal parameters. No other follow-up for BMI required.    A/P:  This is a 70 y.o. female patient who is S/P excision right axillary lipoma on 3/18/2025    She is healing very well.  I discussed the benign pathology findings with her.  She can resume all activities as tolerated and see me back on an as-needed basis.    Rosa Lauren MD  General, Robotic, and Endoscopic Surgery  [facility]    [address], [address]  [place] [address]  P: [phone]  F: [phone]

## 2025-04-25 ENCOUNTER — APPOINTMENT (OUTPATIENT)
Dept: ONCOLOGY | Facility: HOSPITAL | Age: 70
End: 2025-04-25
Payer: MEDICARE

## 2025-04-25 ENCOUNTER — LAB (OUTPATIENT)
Dept: OTHER | Facility: HOSPITAL | Age: 70
End: 2025-04-25
Payer: MEDICARE

## 2025-04-25 ENCOUNTER — OFFICE VISIT (OUTPATIENT)
Dept: ONCOLOGY | Facility: CLINIC | Age: 70
End: 2025-04-25
Payer: MEDICARE

## 2025-04-25 VITALS
BODY MASS INDEX: 22.99 KG/M2 | HEART RATE: 66 BPM | TEMPERATURE: 97.7 F | DIASTOLIC BLOOD PRESSURE: 56 MMHG | SYSTOLIC BLOOD PRESSURE: 96 MMHG | OXYGEN SATURATION: 99 % | HEIGHT: 67 IN | WEIGHT: 146.5 LBS

## 2025-04-25 DIAGNOSIS — D45 POLYCYTHEMIA VERA: ICD-10-CM

## 2025-04-25 DIAGNOSIS — D45 POLYCYTHEMIA VERA: Primary | ICD-10-CM

## 2025-04-25 LAB
BASOPHILS # BLD AUTO: 0.06 10*3/MM3 (ref 0–0.2)
BASOPHILS NFR BLD AUTO: 1 % (ref 0–1.5)
DEPRECATED RDW RBC AUTO: 53.3 FL (ref 37–54)
EOSINOPHIL # BLD AUTO: 0.12 10*3/MM3 (ref 0–0.4)
EOSINOPHIL NFR BLD AUTO: 2 % (ref 0.3–6.2)
ERYTHROCYTE [DISTWIDTH] IN BLOOD BY AUTOMATED COUNT: 15.8 % (ref 12.3–15.4)
FERRITIN SERPL-MCNC: 49.2 NG/ML (ref 13–150)
HCT VFR BLD AUTO: 47.5 % (ref 34–46.6)
HGB BLD-MCNC: 15.3 G/DL (ref 12–15.9)
HGB RETIC QN AUTO: 32.3 PG (ref 29.8–36.1)
IMM GRANULOCYTES # BLD AUTO: 0.01 10*3/MM3 (ref 0–0.05)
IMM GRANULOCYTES NFR BLD AUTO: 0.2 % (ref 0–0.5)
IMM RETICS NFR: 9.2 % (ref 3–15.8)
IRON 24H UR-MRATE: 88 MCG/DL (ref 37–145)
IRON SATN MFR SERPL: 26 % (ref 20–50)
LYMPHOCYTES # BLD AUTO: 1.84 10*3/MM3 (ref 0.7–3.1)
LYMPHOCYTES NFR BLD AUTO: 30.8 % (ref 19.6–45.3)
MCH RBC QN AUTO: 29.4 PG (ref 26.6–33)
MCHC RBC AUTO-ENTMCNC: 32.2 G/DL (ref 31.5–35.7)
MCV RBC AUTO: 91.3 FL (ref 79–97)
MONOCYTES # BLD AUTO: 0.48 10*3/MM3 (ref 0.1–0.9)
MONOCYTES NFR BLD AUTO: 8 % (ref 5–12)
NEUTROPHILS NFR BLD AUTO: 3.47 10*3/MM3 (ref 1.7–7)
NEUTROPHILS NFR BLD AUTO: 58 % (ref 42.7–76)
NRBC BLD AUTO-RTO: 0 /100 WBC (ref 0–0.2)
PLATELET # BLD AUTO: 345 10*3/MM3 (ref 140–450)
PMV BLD AUTO: 10 FL (ref 6–12)
RBC # BLD AUTO: 5.2 10*6/MM3 (ref 3.77–5.28)
RETICS # AUTO: 0.07 10*6/MM3 (ref 0.02–0.13)
RETICS/RBC NFR AUTO: 1.33 % (ref 0.7–1.9)
TIBC SERPL-MCNC: 337 MCG/DL (ref 298–536)
TRANSFERRIN SERPL-MCNC: 226 MG/DL (ref 200–360)
WBC NRBC COR # BLD AUTO: 5.98 10*3/MM3 (ref 3.4–10.8)

## 2025-04-25 PROCEDURE — 84466 ASSAY OF TRANSFERRIN: CPT | Performed by: INTERNAL MEDICINE

## 2025-04-25 PROCEDURE — 82728 ASSAY OF FERRITIN: CPT | Performed by: INTERNAL MEDICINE

## 2025-04-25 PROCEDURE — 85025 COMPLETE CBC W/AUTO DIFF WBC: CPT | Performed by: INTERNAL MEDICINE

## 2025-04-25 PROCEDURE — 85046 RETICYTE/HGB CONCENTRATE: CPT | Performed by: INTERNAL MEDICINE

## 2025-04-25 PROCEDURE — 83540 ASSAY OF IRON: CPT | Performed by: INTERNAL MEDICINE

## 2025-04-25 PROCEDURE — 36415 COLL VENOUS BLD VENIPUNCTURE: CPT

## 2025-04-25 NOTE — PROGRESS NOTES
"[facility] CBC GROUP OUTPATIENT FOLLOW UP CLINIC VISIT    REASON FOR FOLLOW-UP:    JAK2 V617F mutation positive polycythemia vera  Current therapy with therapeutic phlebotomy    HISTORY OF PRESENT ILLNESS:  Fred Farr is a 70 y.o. female who returns today for follow up of the above issue.      She returns to the office today for 3 month follow and review.  She is scheduled today for possible phlebotomy.  Patient remains very reluctant to proceed with phlebotomy after previous phlebotomies have resulted in iron deficiency.  The patient is feeling well today with no new concerns.  She is compliant with her aspirin 81 mg daily.    PHYSICAL EXAMINATION:    Vitals:    04/25/25 1008   BP: 96/56   Pulse: 66   Temp: 97.7 °F (36.5 °C)   TempSrc: Oral   SpO2: 99%   Weight: 66.5 kg (146 lb 8 oz)   Height: 170.2 cm (67.01\")   PainSc: 0-No pain       General:  No acute distress, awake, alert and oriented  Skin:  Warm and dry. No visible rash.   HEENT:  Normocephalic/atraumatic.   Chest:  Normal respiratory effort.  Lungs clear to auscultation bilaterally.  Heart: Regular rate and rhythm  Extremities:  No visible clubbing, cyanosis, or edema  Neuro/psych:  Grossly nonfocal.  Normal mood and affect.     DIAGNOSTIC DATA:    Results from last 7 days   Lab Units 04/25/25  0957   WBC 10*3/mm3 5.98   NEUTROS ABS 10*3/mm3 3.47   HEMOGLOBIN g/dL 15.3   HEMATOCRIT % 47.5*   PLATELETS 10*3/mm3 345       Results from last 7 days   Lab Units 04/25/25  0957   FERRITIN ng/mL 49.20   IRON mcg/dL 88   TIBC mcg/dL 337           IMAGING:  None reviewed    ASSESSMENT:  This is a 70 y.o. female with:    *Polycythemia vera, JAK2 V617F mutation positive  Hgb normal until 11/12/2020 with hgb 16.0, hct 49.8%.  Hct mildly elevated since 2016 at 47-48%  She, however, states that this has been a mild and chronic issue  She does take aspirin 81 mg daily  She states that her Fitbit tells her that her oxygen levels dropped a little bit at night.  She " carries no formal diagnosis of sleep apnea.  No congenital heart problems.  No significant time at high altitudes.  She does not smoke.  She denies any carbon monoxide exposure.  She was seen initially on 1/8/2021.  Hemoglobin 15.8 with hematocrit 48.3%.  Laboratory evaluation pursued.  Erythropoietin normal at 4.5.  Carbon monoxide normal at 2.0.  Ferritin normal at 99.6 with normal iron studies.  She was referred to sleep medicine because of the erythrocytosis and because her Fitbit was telling her that her oxygen level dropped at night.  She saw Dr. Dhillon.  She was found to have mild obstructive sleep apnea by home sleep study 5/24/2021.  She has an appliance now which she is using.  This was recently replaced as her old one fractured.  JAK2 V617F mutation positive  Therapeutic phlebotomy initiated 3/28/2022 with a hematocrit of 48.3%.  Goal less than 42%.  4/29/2022: Hemoglobin improved at 15.3 with hematocrit improved to 47%.  Still above our goal of 42%.  5/27/2022: Hematocrit near goal at 42.7%.  We did not perform therapeutic phlebotomy.  For a hematocrit of 43.8% she did have phlebotomy on 7/8/2022  We have not yet initiated hydroxyurea, though this may be necessary  8/19/2022: Hemoglobin 13.4 and hematocrit at goal at 40.8%  11/18/2022: Hemoglobin higher at 14.7 with hematocrit 46.5%.  Phlebotomy required.  Other blood counts remain normal.  2/3/2023: Hematocrit 43.0%.  However, the MCV is slightly low and the reticulated hemoglobin is low and I believe she may be getting iron deficient.  Therefore, hold phlebotomy.  4/14/2023: Hemoglobin 13.2 with hematocrit 42.4%.  The MCV remains low at 77.9.  Ferritin 9.7 with an iron of 44 and 9% iron saturation  7/7/2023: Hemoglobin 13.8, hematocrit 44.5%.  Iron scratch that ferritin 13.9, iron 60 with 13% iron saturation.  Therapeutic phlebotomy not performed due to recent issues with iron deficiency  10/6/2023: Hemoglobin 14.0, hematocrit 44.8%, MCV 84.1.   Ferritin 13.2, iron 57 with 13% iron saturation.  1/5/24: Hemoglobin 14.5, hematocrit 45.9%. iron studies pending. With stability in hematocrit and ongoing hypotension, no phlebotomy today  4/26/2024: Hemoglobin stable at 14.2, hematocrit 44.1%  10/4/2024: Hemoglobin 14.7 and hematocrit 45.6%. Overall stable and given ongoing issues with hypotension, did not perform phlebotomy today.   1/17/2025: Hematocrit 47.5%, ferritin 30, iron 63 with 16% iron saturation.  4/25/2025 hemoglobin currently 15.3 with hematocrit 47.5%.  Ferritin 49, iron saturation 26%.  We have discussed the possibility of proceeding with phlebotomy today though the patient declines.    *Recent iron deficiency  Likely result of phlebotomy.    10/6/2023: Ferritin 13.2, iron 57 with 13% iron saturation.  Stable.  1/5/2024: Ferritin somewhat improved at 16.9, iron much better at 112.    4/26/2024: Ferritin normal at 40.8, iron 87  1/17/2025: Iron normal at 63, ferritin normal but down a little at 30.2  4/25/2025 without iron deficiency currently with ferritin normal at 49, iron saturation 26%    *Hypotension  BP: 96/56      PLAN:  We discussed options today with hematocrit over goal of 45%.  However, the patient declines to proceed with therapeutic phlebotomy given that her hematocrit is stable compared to 3 months ago  Continue aspirin 81 mg daily  Maintain adequate hydration and continued compliance with CPAP  MD follow-up with Dr. Pedraza in 3 months with CBC, retake, ferritin, iron profile. Generally preferring to keep hematocrit less than 45% but we are making a judgment each time she is seen as to whether phlebotomy is necessary.    I spent 30 minutes caring for Fred on this date of service. This time includes time spent by me in the following activities: preparing for the visit, reviewing tests, obtaining and/or reviewing a separately obtained history, performing a medically appropriate examination and/or evaluation, counseling and educating the  patient/family/caregiver, ordering medications, tests, or procedures, referring and communicating with other health care professionals, documenting information in the medical record, and care coordination.     Sissy Nava, APRN  04/25/2025

## 2025-07-31 NOTE — PROGRESS NOTES
"[facility] CBC GROUP OUTPATIENT FOLLOW UP CLINIC VISIT    REASON FOR FOLLOW-UP:    JAK2 V617F mutation positive polycythemia vera  Current therapy with therapeutic phlebotomy    HISTORY OF PRESENT ILLNESS:  Fred Farr is a 70 y.o. female who returns today for follow up of the above issue.      She continues aspirin 81 mg daily and is otherwise doing well with no new complaints today.    PHYSICAL EXAMINATION:    Vitals:    08/01/25 0915   BP: 140/68   Pulse: 65   Temp: 97.7 °F (36.5 °C)   TempSrc: Oral   SpO2: 98%   Weight: 65.8 kg (145 lb)   Height: 170.2 cm (67.01\")   PainSc: 0-No pain     General:  No acute distress, awake, alert and oriented  Skin:  Warm and dry, no visible rash  HEENT:  Normocephalic/atraumatic.  Chest:  Normal respiratory effort  Extremities:  No visible clubbing, cyanosis, or edema  Neuro/psych:  Grossly nonfocal.  Normal mood and affect.        DIAGNOSTIC DATA:  CBC & Differential (08/01/2025 09:06)  Retic With IRF & RET-He (08/01/2025 09:06)  Ferritin (08/01/2025 09:06)  Iron Profile (08/01/2025 09:06)    IMAGING:  None reviewed    ASSESSMENT:  This is a 70 y.o. female with:    *Polycythemia vera, JAK2 V617F mutation positive  Hgb normal until 11/12/2020 with hgb 16.0, hct 49.8%.  Hct mildly elevated since 2016 at 47-48%  She, however, states that this has been a mild and chronic issue  She does take aspirin 81 mg daily  She states that her Fitbit tells her that her oxygen levels dropped a little bit at night.  She carries no formal diagnosis of sleep apnea.  No congenital heart problems.  No significant time at high altitudes.  She does not smoke.  She denies any carbon monoxide exposure.  She was seen initially on 1/8/2021.  Hemoglobin 15.8 with hematocrit 48.3%.  Laboratory evaluation pursued.  Erythropoietin normal at 4.5.  Carbon monoxide normal at 2.0.  Ferritin normal at 99.6 with normal iron studies.  She was referred to sleep medicine because of the erythrocytosis and because " her Fitbit was telling her that her oxygen level dropped at night.  She saw Dr. Dhillon.  She was found to have mild obstructive sleep apnea by home sleep study 5/24/2021.  She has an appliance now which she is using.  This was recently replaced as her old one fractured.  JAK2 V617F mutation positive  Therapeutic phlebotomy initiated 3/28/2022 with a hematocrit of 48.3%.  Goal less than 42%.  4/29/2022: Hemoglobin improved at 15.3 with hematocrit improved to 47%.  Still above our goal of 42%.  5/27/2022: Hematocrit near goal at 42.7%.  We did not perform therapeutic phlebotomy.  For a hematocrit of 43.8% she did have phlebotomy on 7/8/2022  We have not yet initiated hydroxyurea, though this may be necessary  8/19/2022: Hemoglobin 13.4 and hematocrit at goal at 40.8%  11/18/2022: Hemoglobin higher at 14.7 with hematocrit 46.5%.  Phlebotomy required.  Other blood counts remain normal.  2/3/2023: Hematocrit 43.0%.  However, the MCV is slightly low and the reticulated hemoglobin is low and I believe she may be getting iron deficient.  Therefore, hold phlebotomy.  4/14/2023: Hemoglobin 13.2 with hematocrit 42.4%.  The MCV remains low at 77.9.  Ferritin 9.7 with an iron of 44 and 9% iron saturation  7/7/2023: Hemoglobin 13.8, hematocrit 44.5%.  Iron scratch that ferritin 13.9, iron 60 with 13% iron saturation.  Therapeutic phlebotomy not performed due to recent issues with iron deficiency  10/6/2023: Hemoglobin 14.0, hematocrit 44.8%, MCV 84.1.  Ferritin 13.2, iron 57 with 13% iron saturation.  1/5/24: Hemoglobin 14.5, hematocrit 45.9%. iron studies pending. With stability in hematocrit and ongoing hypotension, no phlebotomy today  4/26/2024: Hemoglobin stable at 14.2, hematocrit 44.1%  10/4/2024: Hemoglobin 14.7 and hematocrit 45.6%. Overall stable and given ongoing issues with hypotension, did not perform phlebotomy today.   1/17/2025: Hematocrit 47.5%, ferritin 30, iron 63 with 16% iron saturation.  4/25/2025  hemoglobin currently 15.3 with hematocrit 47.5%.  Ferritin 49, iron saturation 26%.  We have discussed the possibility of proceeding with phlebotomy today though the patient declines.  8/1/2025: Hemoglobin 15.1, hematocrit 46.2%    *Recent iron deficiency  Likely result of phlebotomy.    10/6/2023: Ferritin 13.2, iron 57 with 13% iron saturation.  Stable.  1/5/2024: Ferritin somewhat improved at 16.9, iron much better at 112.    4/26/2024: Ferritin normal at 40.8, iron 87  1/17/2025: Iron normal at 63, ferritin normal but down a little at 30.2  4/25/2025 without iron deficiency currently with ferritin normal at 49, iron saturation 26%  8/1/2025: Ferritin 71, iron 89, 26% iron saturation    *Hypotension  BP: 140/68      PLAN:  Generally preferring to keep hematocrit less than 45% but we are making a judgment each time she is seen as to whether phlebotomy is necessary.  No need for therapeutic phlebotomy today  Follow-up with me in 3 months with labs, therapeutic phlebotomy if appropriate    Lenard Pedraza MD  08/01/2025

## 2025-08-01 ENCOUNTER — LAB (OUTPATIENT)
Dept: OTHER | Facility: HOSPITAL | Age: 70
End: 2025-08-01
Payer: MEDICARE

## 2025-08-01 ENCOUNTER — OFFICE VISIT (OUTPATIENT)
Dept: ONCOLOGY | Facility: CLINIC | Age: 70
End: 2025-08-01
Payer: MEDICARE

## 2025-08-01 ENCOUNTER — INFUSION (OUTPATIENT)
Dept: ONCOLOGY | Facility: HOSPITAL | Age: 70
End: 2025-08-01
Payer: MEDICARE

## 2025-08-01 VITALS
HEART RATE: 65 BPM | TEMPERATURE: 97.7 F | OXYGEN SATURATION: 98 % | WEIGHT: 145 LBS | BODY MASS INDEX: 22.76 KG/M2 | SYSTOLIC BLOOD PRESSURE: 140 MMHG | HEIGHT: 67 IN | DIASTOLIC BLOOD PRESSURE: 68 MMHG

## 2025-08-01 DIAGNOSIS — D45 POLYCYTHEMIA VERA: ICD-10-CM

## 2025-08-01 DIAGNOSIS — D45 POLYCYTHEMIA VERA: Primary | ICD-10-CM

## 2025-08-01 LAB
BASOPHILS # BLD AUTO: 0.08 10*3/MM3 (ref 0–0.2)
BASOPHILS NFR BLD AUTO: 1.4 % (ref 0–1.5)
DEPRECATED RDW RBC AUTO: 50.4 FL (ref 37–54)
EOSINOPHIL # BLD AUTO: 0.16 10*3/MM3 (ref 0–0.4)
EOSINOPHIL NFR BLD AUTO: 2.7 % (ref 0.3–6.2)
ERYTHROCYTE [DISTWIDTH] IN BLOOD BY AUTOMATED COUNT: 15.1 % (ref 12.3–15.4)
FERRITIN SERPL-MCNC: 71.7 NG/ML (ref 13–150)
HCT VFR BLD AUTO: 46.2 % (ref 34–46.6)
HGB BLD-MCNC: 15.1 G/DL (ref 12–15.9)
HGB RETIC QN AUTO: 32.4 PG (ref 29.8–36.1)
IMM GRANULOCYTES # BLD AUTO: 0.02 10*3/MM3 (ref 0–0.05)
IMM GRANULOCYTES NFR BLD AUTO: 0.3 % (ref 0–0.5)
IMM RETICS NFR: 7.5 % (ref 3–15.8)
IRON 24H UR-MRATE: 89 MCG/DL (ref 37–145)
IRON SATN MFR SERPL: 26 % (ref 20–50)
LYMPHOCYTES # BLD AUTO: 1.79 10*3/MM3 (ref 0.7–3.1)
LYMPHOCYTES NFR BLD AUTO: 30.8 % (ref 19.6–45.3)
MCH RBC QN AUTO: 30 PG (ref 26.6–33)
MCHC RBC AUTO-ENTMCNC: 32.7 G/DL (ref 31.5–35.7)
MCV RBC AUTO: 91.8 FL (ref 79–97)
MONOCYTES # BLD AUTO: 0.61 10*3/MM3 (ref 0.1–0.9)
MONOCYTES NFR BLD AUTO: 10.5 % (ref 5–12)
NEUTROPHILS NFR BLD AUTO: 3.16 10*3/MM3 (ref 1.7–7)
NEUTROPHILS NFR BLD AUTO: 54.3 % (ref 42.7–76)
NRBC BLD AUTO-RTO: 0 /100 WBC (ref 0–0.2)
PLATELET # BLD AUTO: 344 10*3/MM3 (ref 140–450)
PMV BLD AUTO: 10.1 FL (ref 6–12)
RBC # BLD AUTO: 5.03 10*6/MM3 (ref 3.77–5.28)
RETICS # AUTO: 0.07 10*6/MM3 (ref 0.02–0.13)
RETICS/RBC NFR AUTO: 1.34 % (ref 0.7–1.9)
TIBC SERPL-MCNC: 341 MCG/DL (ref 298–536)
TRANSFERRIN SERPL-MCNC: 229 MG/DL (ref 200–360)
WBC NRBC COR # BLD AUTO: 5.82 10*3/MM3 (ref 3.4–10.8)

## 2025-08-01 PROCEDURE — 85046 RETICYTE/HGB CONCENTRATE: CPT | Performed by: INTERNAL MEDICINE

## 2025-08-01 PROCEDURE — 83540 ASSAY OF IRON: CPT | Performed by: INTERNAL MEDICINE

## 2025-08-01 PROCEDURE — 84466 ASSAY OF TRANSFERRIN: CPT | Performed by: INTERNAL MEDICINE

## 2025-08-01 PROCEDURE — 36415 COLL VENOUS BLD VENIPUNCTURE: CPT

## 2025-08-01 PROCEDURE — 82728 ASSAY OF FERRITIN: CPT | Performed by: INTERNAL MEDICINE

## 2025-08-01 PROCEDURE — 85025 COMPLETE CBC W/AUTO DIFF WBC: CPT | Performed by: INTERNAL MEDICINE

## 2025-08-11 RX ORDER — VALACYCLOVIR HYDROCHLORIDE 1 G/1
2000 TABLET, FILM COATED ORAL 2 TIMES DAILY PRN
Qty: 8 TABLET | Refills: 1 | Status: SHIPPED | OUTPATIENT
Start: 2025-08-11 | End: 2025-08-14 | Stop reason: SDUPTHER

## 2025-08-14 RX ORDER — VALACYCLOVIR HYDROCHLORIDE 1 G/1
2000 TABLET, FILM COATED ORAL 2 TIMES DAILY PRN
Qty: 8 TABLET | Refills: 1 | Status: SHIPPED | OUTPATIENT
Start: 2025-08-14

## (undated) DEVICE — SINGLE-USE BIOPSY FORCEPS: Brand: RADIAL JAW 4

## (undated) DEVICE — EXOFIN PRECISION PEN HIGH VISCOSITY TOPICAL SKIN ADHESIVE: Brand: EXOFIN PRECISION PEN, 1G

## (undated) DEVICE — GLV SURG SENSICARE POLYISPRN W/ALOE PF LF 6.5 GRN STRL

## (undated) DEVICE — GLV SURG BIOGEL LTX PF 6

## (undated) DEVICE — CANN O2 ETCO2 FITS ALL CONN CO2 SMPL A/ 7IN DISP LF

## (undated) DEVICE — THE TORRENT IRRIGATION SCOPE CONNECTOR IS USED WITH THE TORRENT IRRIGATION TUBING TO PROVIDE IRRIGATION FLUIDS SUCH AS STERILE WATER DURING GASTROINTESTINAL ENDOSCOPIC PROCEDURES WHEN USED IN CONJUNCTION WITH AN IRRIGATION PUMP (OR ELECTROSURGICAL UNIT).: Brand: TORRENT

## (undated) DEVICE — ADAPT CLN BIOGUARD AIR/H2O DISP

## (undated) DEVICE — ANTIBACTERIAL UNDYED BRAIDED (POLYGLACTIN 910), SYNTHETIC ABSORBABLE SUTURE: Brand: COATED VICRYL

## (undated) DEVICE — APPL CHLORAPREP HI/LITE 26ML ORNG

## (undated) DEVICE — KT ORCA ORCAPOD DISP STRL

## (undated) DEVICE — TUBING, SUCTION, 1/4" X 10', STRAIGHT: Brand: MEDLINE

## (undated) DEVICE — CANN NASL CO2 TRULINK W/O2 A/

## (undated) DEVICE — ELECTRD BLD EZ CLN MOD XLNG 2.75IN

## (undated) DEVICE — SENSR O2 OXIMAX FNGR A/ 18IN NONSTR

## (undated) DEVICE — GOWN,SIRUS,NON REINFRCD,LARGE,SET IN SL: Brand: MEDLINE

## (undated) DEVICE — LOU MINOR PROCEDURE: Brand: MEDLINE INDUSTRIES, INC.

## (undated) DEVICE — Device: Brand: DEFENDO AIR/WATER/SUCTION AND BIOPSY VALVE

## (undated) DEVICE — PATIENT RETURN ELECTRODE, SINGLE-USE, CONTACT QUALITY MONITORING, ADULT, WITH 9FT CORD, FOR PATIENTS WEIGING OVER 33LBS. (15KG): Brand: MEGADYNE

## (undated) DEVICE — SYR LL TP 10ML STRL